# Patient Record
Sex: MALE | Race: WHITE | NOT HISPANIC OR LATINO
[De-identification: names, ages, dates, MRNs, and addresses within clinical notes are randomized per-mention and may not be internally consistent; named-entity substitution may affect disease eponyms.]

---

## 2017-01-02 ENCOUNTER — APPOINTMENT (OUTPATIENT)
Dept: PSYCHIATRY | Facility: CLINIC | Age: 77
End: 2017-01-02

## 2017-01-05 ENCOUNTER — APPOINTMENT (OUTPATIENT)
Dept: PSYCHIATRY | Facility: CLINIC | Age: 77
End: 2017-01-05

## 2017-01-09 ENCOUNTER — APPOINTMENT (OUTPATIENT)
Dept: PSYCHIATRY | Facility: CLINIC | Age: 77
End: 2017-01-09

## 2017-01-23 ENCOUNTER — APPOINTMENT (OUTPATIENT)
Dept: PSYCHIATRY | Facility: CLINIC | Age: 77
End: 2017-01-23

## 2017-01-30 ENCOUNTER — APPOINTMENT (OUTPATIENT)
Dept: PSYCHIATRY | Facility: CLINIC | Age: 77
End: 2017-01-30

## 2017-02-02 ENCOUNTER — APPOINTMENT (OUTPATIENT)
Dept: PSYCHIATRY | Facility: CLINIC | Age: 77
End: 2017-02-02

## 2017-02-06 ENCOUNTER — APPOINTMENT (OUTPATIENT)
Dept: PSYCHIATRY | Facility: CLINIC | Age: 77
End: 2017-02-06

## 2017-02-28 ENCOUNTER — APPOINTMENT (OUTPATIENT)
Dept: PSYCHIATRY | Facility: CLINIC | Age: 77
End: 2017-02-28

## 2017-03-07 ENCOUNTER — APPOINTMENT (OUTPATIENT)
Dept: PSYCHIATRY | Facility: CLINIC | Age: 77
End: 2017-03-07

## 2017-04-03 ENCOUNTER — APPOINTMENT (OUTPATIENT)
Dept: PSYCHIATRY | Facility: CLINIC | Age: 77
End: 2017-04-03

## 2017-04-06 ENCOUNTER — OUTPATIENT (OUTPATIENT)
Dept: OUTPATIENT SERVICES | Facility: HOSPITAL | Age: 77
LOS: 1 days | Discharge: HOME | End: 2017-04-06

## 2017-04-24 ENCOUNTER — APPOINTMENT (OUTPATIENT)
Dept: PSYCHIATRY | Facility: CLINIC | Age: 77
End: 2017-04-24

## 2017-05-03 ENCOUNTER — APPOINTMENT (OUTPATIENT)
Dept: PSYCHIATRY | Facility: CLINIC | Age: 77
End: 2017-05-03

## 2017-05-08 ENCOUNTER — APPOINTMENT (OUTPATIENT)
Dept: PSYCHIATRY | Facility: CLINIC | Age: 77
End: 2017-05-08

## 2017-05-10 DIAGNOSIS — F41.1 GENERALIZED ANXIETY DISORDER: ICD-10-CM

## 2017-06-05 ENCOUNTER — APPOINTMENT (OUTPATIENT)
Dept: PSYCHIATRY | Facility: CLINIC | Age: 77
End: 2017-06-05

## 2017-06-09 ENCOUNTER — APPOINTMENT (OUTPATIENT)
Dept: PSYCHIATRY | Facility: CLINIC | Age: 77
End: 2017-06-09

## 2017-07-10 ENCOUNTER — APPOINTMENT (OUTPATIENT)
Dept: PSYCHIATRY | Facility: CLINIC | Age: 77
End: 2017-07-10

## 2017-07-11 ENCOUNTER — APPOINTMENT (OUTPATIENT)
Dept: PSYCHIATRY | Facility: CLINIC | Age: 77
End: 2017-07-11

## 2017-07-24 ENCOUNTER — APPOINTMENT (OUTPATIENT)
Dept: PSYCHIATRY | Facility: CLINIC | Age: 77
End: 2017-07-24

## 2017-08-07 ENCOUNTER — APPOINTMENT (OUTPATIENT)
Dept: PSYCHIATRY | Facility: CLINIC | Age: 77
End: 2017-08-07
Payer: MEDICARE

## 2017-08-07 PROCEDURE — 99213 OFFICE O/P EST LOW 20 MIN: CPT

## 2017-08-07 PROCEDURE — 90832 PSYTX W PT 30 MINUTES: CPT

## 2017-08-14 ENCOUNTER — APPOINTMENT (OUTPATIENT)
Dept: PSYCHIATRY | Facility: CLINIC | Age: 77
End: 2017-08-14

## 2017-09-12 ENCOUNTER — APPOINTMENT (OUTPATIENT)
Dept: PSYCHIATRY | Facility: CLINIC | Age: 77
End: 2017-09-12

## 2017-09-14 ENCOUNTER — APPOINTMENT (OUTPATIENT)
Dept: PSYCHIATRY | Facility: CLINIC | Age: 77
End: 2017-09-14
Payer: MEDICARE

## 2017-09-14 PROCEDURE — 99213 OFFICE O/P EST LOW 20 MIN: CPT

## 2017-09-18 ENCOUNTER — APPOINTMENT (OUTPATIENT)
Dept: PSYCHIATRY | Facility: CLINIC | Age: 77
End: 2017-09-18
Payer: MEDICARE

## 2017-09-18 PROCEDURE — 90832 PSYTX W PT 30 MINUTES: CPT

## 2017-10-01 ENCOUNTER — OUTPATIENT (OUTPATIENT)
Dept: OUTPATIENT SERVICES | Facility: HOSPITAL | Age: 77
LOS: 1 days | Discharge: HOME | End: 2017-10-01

## 2017-10-09 ENCOUNTER — APPOINTMENT (OUTPATIENT)
Dept: PSYCHIATRY | Facility: CLINIC | Age: 77
End: 2017-10-09
Payer: MEDICARE

## 2017-10-09 PROCEDURE — 99213 OFFICE O/P EST LOW 20 MIN: CPT

## 2017-10-09 PROCEDURE — 90832 PSYTX W PT 30 MINUTES: CPT

## 2017-10-10 DIAGNOSIS — F34.1 DYSTHYMIC DISORDER: ICD-10-CM

## 2017-11-08 ENCOUNTER — APPOINTMENT (OUTPATIENT)
Dept: PSYCHIATRY | Facility: CLINIC | Age: 77
End: 2017-11-08
Payer: MEDICARE

## 2017-11-08 PROCEDURE — 90832 PSYTX W PT 30 MINUTES: CPT

## 2017-11-08 PROCEDURE — 99213 OFFICE O/P EST LOW 20 MIN: CPT

## 2017-12-04 ENCOUNTER — APPOINTMENT (OUTPATIENT)
Dept: PSYCHIATRY | Facility: CLINIC | Age: 77
End: 2017-12-04
Payer: MEDICARE

## 2017-12-04 PROCEDURE — 99214 OFFICE O/P EST MOD 30 MIN: CPT

## 2017-12-06 ENCOUNTER — APPOINTMENT (OUTPATIENT)
Dept: PSYCHIATRY | Facility: CLINIC | Age: 77
End: 2017-12-06

## 2018-01-03 ENCOUNTER — APPOINTMENT (OUTPATIENT)
Dept: PSYCHIATRY | Facility: CLINIC | Age: 78
End: 2018-01-03

## 2018-01-16 ENCOUNTER — APPOINTMENT (OUTPATIENT)
Dept: PSYCHIATRY | Facility: CLINIC | Age: 78
End: 2018-01-16
Payer: MEDICARE

## 2018-01-16 PROCEDURE — 99214 OFFICE O/P EST MOD 30 MIN: CPT

## 2018-01-21 ENCOUNTER — EMERGENCY (EMERGENCY)
Facility: HOSPITAL | Age: 78
LOS: 1 days | Discharge: HOME | End: 2018-01-21
Attending: EMERGENCY MEDICINE | Admitting: EMERGENCY MEDICINE
Payer: MEDICARE

## 2018-01-21 VITALS
RESPIRATION RATE: 17 BRPM | DIASTOLIC BLOOD PRESSURE: 80 MMHG | SYSTOLIC BLOOD PRESSURE: 203 MMHG | TEMPERATURE: 98 F | HEART RATE: 89 BPM | OXYGEN SATURATION: 98 %

## 2018-01-21 VITALS
HEART RATE: 70 BPM | TEMPERATURE: 98 F | DIASTOLIC BLOOD PRESSURE: 82 MMHG | RESPIRATION RATE: 16 BRPM | SYSTOLIC BLOOD PRESSURE: 160 MMHG | OXYGEN SATURATION: 97 %

## 2018-01-21 DIAGNOSIS — Z79.82 LONG TERM (CURRENT) USE OF ASPIRIN: ICD-10-CM

## 2018-01-21 DIAGNOSIS — Z79.84 LONG TERM (CURRENT) USE OF ORAL HYPOGLYCEMIC DRUGS: ICD-10-CM

## 2018-01-21 DIAGNOSIS — R42 DIZZINESS AND GIDDINESS: ICD-10-CM

## 2018-01-21 DIAGNOSIS — E11.9 TYPE 2 DIABETES MELLITUS WITHOUT COMPLICATIONS: ICD-10-CM

## 2018-01-21 DIAGNOSIS — Z79.899 OTHER LONG TERM (CURRENT) DRUG THERAPY: ICD-10-CM

## 2018-01-21 DIAGNOSIS — I10 ESSENTIAL (PRIMARY) HYPERTENSION: ICD-10-CM

## 2018-01-21 LAB
ALBUMIN SERPL ELPH-MCNC: 4.5 G/DL — SIGNIFICANT CHANGE UP (ref 3.4–5)
ALP SERPL-CCNC: 69 U/L — SIGNIFICANT CHANGE UP (ref 40–120)
ALT FLD-CCNC: 25 U/L — SIGNIFICANT CHANGE UP (ref 12–42)
ANION GAP SERPL CALC-SCNC: 6 MMOL/L — LOW (ref 9–16)
AST SERPL-CCNC: 30 U/L — SIGNIFICANT CHANGE UP (ref 15–37)
BASOPHILS NFR BLD AUTO: 0.3 % — SIGNIFICANT CHANGE UP (ref 0–2)
BILIRUB SERPL-MCNC: 0.5 MG/DL — SIGNIFICANT CHANGE UP (ref 0.2–1.2)
BUN SERPL-MCNC: 13 MG/DL — SIGNIFICANT CHANGE UP (ref 7–23)
CALCIUM SERPL-MCNC: 9.3 MG/DL — SIGNIFICANT CHANGE UP (ref 8.5–10.5)
CHLORIDE SERPL-SCNC: 94 MMOL/L — LOW (ref 96–108)
CO2 SERPL-SCNC: 32 MMOL/L — HIGH (ref 22–31)
CREAT SERPL-MCNC: 0.71 MG/DL — SIGNIFICANT CHANGE UP (ref 0.5–1.3)
EOSINOPHIL NFR BLD AUTO: 0.5 % — SIGNIFICANT CHANGE UP (ref 0–6)
GLUCOSE SERPL-MCNC: 144 MG/DL — HIGH (ref 70–99)
HCT VFR BLD CALC: 44.6 % — SIGNIFICANT CHANGE UP (ref 39–50)
HGB BLD-MCNC: 15.3 G/DL — SIGNIFICANT CHANGE UP (ref 13–17)
IMM GRANULOCYTES NFR BLD AUTO: 0.3 % — SIGNIFICANT CHANGE UP (ref 0–1.5)
LYMPHOCYTES # BLD AUTO: 36.1 % — SIGNIFICANT CHANGE UP (ref 13–44)
MCHC RBC-ENTMCNC: 28.8 PG — SIGNIFICANT CHANGE UP (ref 27–34)
MCHC RBC-ENTMCNC: 34.3 G/DL — SIGNIFICANT CHANGE UP (ref 32–36)
MCV RBC AUTO: 84 FL — SIGNIFICANT CHANGE UP (ref 80–100)
MONOCYTES NFR BLD AUTO: 6.6 % — SIGNIFICANT CHANGE UP (ref 2–14)
NEUTROPHILS NFR BLD AUTO: 56.2 % — SIGNIFICANT CHANGE UP (ref 43–77)
PLATELET # BLD AUTO: 187 K/UL — SIGNIFICANT CHANGE UP (ref 150–400)
POTASSIUM SERPL-MCNC: 3.9 MMOL/L — SIGNIFICANT CHANGE UP (ref 3.5–5.3)
POTASSIUM SERPL-SCNC: 3.9 MMOL/L — SIGNIFICANT CHANGE UP (ref 3.5–5.3)
PROT SERPL-MCNC: 7.9 G/DL — SIGNIFICANT CHANGE UP (ref 6.4–8.2)
RBC # BLD: 5.31 M/UL — SIGNIFICANT CHANGE UP (ref 4.2–5.8)
RBC # FLD: 13.2 % — SIGNIFICANT CHANGE UP (ref 10.3–16.9)
SODIUM SERPL-SCNC: 132 MMOL/L — SIGNIFICANT CHANGE UP (ref 132–145)
WBC # BLD: 8.7 K/UL — SIGNIFICANT CHANGE UP (ref 3.8–10.5)
WBC # FLD AUTO: 8.7 K/UL — SIGNIFICANT CHANGE UP (ref 3.8–10.5)

## 2018-01-21 PROCEDURE — 99284 EMERGENCY DEPT VISIT MOD MDM: CPT

## 2018-01-21 RX ORDER — SODIUM CHLORIDE 9 MG/ML
3 INJECTION INTRAMUSCULAR; INTRAVENOUS; SUBCUTANEOUS ONCE
Qty: 0 | Refills: 0 | Status: COMPLETED | OUTPATIENT
Start: 2018-01-21 | End: 2018-01-21

## 2018-01-21 RX ADMIN — SODIUM CHLORIDE 3 MILLILITER(S): 9 INJECTION INTRAMUSCULAR; INTRAVENOUS; SUBCUTANEOUS at 17:20

## 2018-01-21 NOTE — ED PROVIDER NOTE - OBJECTIVE STATEMENT
76 yo male w history of htn and dm 2 states episode of dizziness today. previous similar episode in past but today's episode lasted longer prompting him to come to er with his friend.     Denies HA,  numbness, tingling, photophobia, diplopia, change in vision/hearing/gait/mental status/speech, focal weakness, neck pain, rash, fever, chills, stiffness, CP, SOB, palpitations, diaphoresis, N/V/D/C, abdominal pain,

## 2018-01-21 NOTE — ED PROVIDER NOTE - MEDICAL DECISION MAKING DETAILS
progressive improvement throughout ed stay. ambulating around ed w/o dizziness.   At the time of discharge from the Emergency Department, the patient is alert with fluent appropriate speech and ambulatory without difficulty. A complete medical screening examination was performed and no emergency medical condition was identified.

## 2018-01-21 NOTE — ED PROVIDER NOTE - NEUROLOGICAL, MLM
Alert and oriented, speech fluent and appropriate cooperative no motor or sensory deficits. able to get up out of bed without difficulty to standing minimal dizziness (feels "much better"

## 2018-01-21 NOTE — ED ADULT NURSE NOTE - PMH
Depression    DM2 (diabetes mellitus, type 2)  since 5/16/2017  Melanoma    Tinnitus of left ear    Vertigo

## 2018-01-24 ENCOUNTER — APPOINTMENT (OUTPATIENT)
Dept: PSYCHIATRY | Facility: CLINIC | Age: 78
End: 2018-01-24
Payer: MEDICARE

## 2018-01-24 PROCEDURE — 90832 PSYTX W PT 30 MINUTES: CPT

## 2018-02-14 ENCOUNTER — APPOINTMENT (OUTPATIENT)
Dept: PSYCHIATRY | Facility: CLINIC | Age: 78
End: 2018-02-14
Payer: MEDICARE

## 2018-02-14 PROCEDURE — 90832 PSYTX W PT 30 MINUTES: CPT

## 2018-02-14 PROCEDURE — 99213 OFFICE O/P EST LOW 20 MIN: CPT

## 2018-03-21 ENCOUNTER — APPOINTMENT (OUTPATIENT)
Dept: PSYCHIATRY | Facility: CLINIC | Age: 78
End: 2018-03-21

## 2018-03-28 ENCOUNTER — APPOINTMENT (OUTPATIENT)
Dept: PSYCHIATRY | Facility: CLINIC | Age: 78
End: 2018-03-28
Payer: MEDICARE

## 2018-03-28 PROCEDURE — 90832 PSYTX W PT 30 MINUTES: CPT

## 2018-03-30 ENCOUNTER — APPOINTMENT (OUTPATIENT)
Dept: PSYCHIATRY | Facility: CLINIC | Age: 78
End: 2018-03-30
Payer: MEDICARE

## 2018-03-30 PROCEDURE — 99213 OFFICE O/P EST LOW 20 MIN: CPT

## 2018-04-25 ENCOUNTER — APPOINTMENT (OUTPATIENT)
Dept: PSYCHIATRY | Facility: CLINIC | Age: 78
End: 2018-04-25
Payer: MEDICARE

## 2018-04-25 PROCEDURE — 99213 OFFICE O/P EST LOW 20 MIN: CPT

## 2018-04-25 PROCEDURE — 90832 PSYTX W PT 30 MINUTES: CPT

## 2018-06-06 ENCOUNTER — APPOINTMENT (OUTPATIENT)
Dept: PSYCHIATRY | Facility: CLINIC | Age: 78
End: 2018-06-06
Payer: MEDICARE

## 2018-06-06 PROCEDURE — 99213 OFFICE O/P EST LOW 20 MIN: CPT

## 2018-06-06 PROCEDURE — 90832 PSYTX W PT 30 MINUTES: CPT

## 2018-08-03 PROBLEM — E11.9 TYPE 2 DIABETES MELLITUS WITHOUT COMPLICATIONS: Chronic | Status: ACTIVE | Noted: 2018-01-21

## 2018-08-08 ENCOUNTER — APPOINTMENT (OUTPATIENT)
Dept: PSYCHIATRY | Facility: CLINIC | Age: 78
End: 2018-08-08
Payer: MEDICARE

## 2018-08-08 PROCEDURE — 99213 OFFICE O/P EST LOW 20 MIN: CPT

## 2018-08-08 PROCEDURE — 90834 PSYTX W PT 45 MINUTES: CPT

## 2018-09-14 ENCOUNTER — EMERGENCY (EMERGENCY)
Facility: HOSPITAL | Age: 78
LOS: 1 days | Discharge: HOME | End: 2018-09-14
Admitting: EMERGENCY MEDICINE
Payer: MEDICARE

## 2018-09-14 VITALS
RESPIRATION RATE: 20 BRPM | OXYGEN SATURATION: 98 % | TEMPERATURE: 98 F | HEART RATE: 72 BPM | DIASTOLIC BLOOD PRESSURE: 90 MMHG | SYSTOLIC BLOOD PRESSURE: 199 MMHG

## 2018-09-14 VITALS — HEART RATE: 78 BPM | SYSTOLIC BLOOD PRESSURE: 161 MMHG | DIASTOLIC BLOOD PRESSURE: 77 MMHG

## 2018-09-14 DIAGNOSIS — I10 ESSENTIAL (PRIMARY) HYPERTENSION: ICD-10-CM

## 2018-09-14 DIAGNOSIS — R26.2 DIFFICULTY IN WALKING, NOT ELSEWHERE CLASSIFIED: ICD-10-CM

## 2018-09-14 DIAGNOSIS — E11.9 TYPE 2 DIABETES MELLITUS WITHOUT COMPLICATIONS: ICD-10-CM

## 2018-09-14 DIAGNOSIS — R42 DIZZINESS AND GIDDINESS: ICD-10-CM

## 2018-09-14 DIAGNOSIS — Z79.899 OTHER LONG TERM (CURRENT) DRUG THERAPY: ICD-10-CM

## 2018-09-14 DIAGNOSIS — Z79.84 LONG TERM (CURRENT) USE OF ORAL HYPOGLYCEMIC DRUGS: ICD-10-CM

## 2018-09-14 DIAGNOSIS — Z79.82 LONG TERM (CURRENT) USE OF ASPIRIN: ICD-10-CM

## 2018-09-14 LAB
ALBUMIN SERPL ELPH-MCNC: 4 G/DL — SIGNIFICANT CHANGE UP (ref 3.4–5)
ALP SERPL-CCNC: 61 U/L — SIGNIFICANT CHANGE UP (ref 40–120)
ALT FLD-CCNC: 27 U/L — SIGNIFICANT CHANGE UP (ref 12–42)
ANION GAP SERPL CALC-SCNC: 5 MMOL/L — LOW (ref 9–16)
APPEARANCE UR: CLEAR — SIGNIFICANT CHANGE UP
AST SERPL-CCNC: 20 U/L — SIGNIFICANT CHANGE UP (ref 15–37)
BASOPHILS NFR BLD AUTO: 0.6 % — SIGNIFICANT CHANGE UP (ref 0–2)
BILIRUB SERPL-MCNC: 0.4 MG/DL — SIGNIFICANT CHANGE UP (ref 0.2–1.2)
BILIRUB UR-MCNC: NEGATIVE — SIGNIFICANT CHANGE UP
BUN SERPL-MCNC: 16 MG/DL — SIGNIFICANT CHANGE UP (ref 7–23)
CALCIUM SERPL-MCNC: 8.7 MG/DL — SIGNIFICANT CHANGE UP (ref 8.5–10.5)
CHLORIDE SERPL-SCNC: 103 MMOL/L — SIGNIFICANT CHANGE UP (ref 96–108)
CK MB BLD-MCNC: 2.5 % — SIGNIFICANT CHANGE UP
CK MB CFR SERPL CALC: 2.9 NG/ML — SIGNIFICANT CHANGE UP (ref 0.5–3.6)
CO2 SERPL-SCNC: 32 MMOL/L — HIGH (ref 22–31)
COLOR SPEC: YELLOW — SIGNIFICANT CHANGE UP
CREAT SERPL-MCNC: 0.86 MG/DL — SIGNIFICANT CHANGE UP (ref 0.5–1.3)
DIFF PNL FLD: ABNORMAL
EOSINOPHIL NFR BLD AUTO: 0.9 % — SIGNIFICANT CHANGE UP (ref 0–6)
GLUCOSE SERPL-MCNC: 214 MG/DL — HIGH (ref 70–99)
GLUCOSE UR QL: NEGATIVE — SIGNIFICANT CHANGE UP
HCT VFR BLD CALC: 40.9 % — SIGNIFICANT CHANGE UP (ref 39–50)
HGB BLD-MCNC: 13.8 G/DL — SIGNIFICANT CHANGE UP (ref 13–17)
IMM GRANULOCYTES NFR BLD AUTO: 0.5 % — SIGNIFICANT CHANGE UP (ref 0–1.5)
KETONES UR-MCNC: NEGATIVE — SIGNIFICANT CHANGE UP
LEUKOCYTE ESTERASE UR-ACNC: NEGATIVE — SIGNIFICANT CHANGE UP
LYMPHOCYTES # BLD AUTO: 35.9 % — SIGNIFICANT CHANGE UP (ref 13–44)
MAGNESIUM SERPL-MCNC: 2.3 MG/DL — SIGNIFICANT CHANGE UP (ref 1.6–2.6)
MCHC RBC-ENTMCNC: 29.2 PG — SIGNIFICANT CHANGE UP (ref 27–34)
MCHC RBC-ENTMCNC: 33.7 G/DL — SIGNIFICANT CHANGE UP (ref 32–36)
MCV RBC AUTO: 86.7 FL — SIGNIFICANT CHANGE UP (ref 80–100)
MONOCYTES NFR BLD AUTO: 6.5 % — SIGNIFICANT CHANGE UP (ref 2–14)
NEUTROPHILS NFR BLD AUTO: 55.6 % — SIGNIFICANT CHANGE UP (ref 43–77)
NITRITE UR-MCNC: NEGATIVE — SIGNIFICANT CHANGE UP
PH UR: 5.5 — SIGNIFICANT CHANGE UP (ref 5–8)
PLATELET # BLD AUTO: 137 K/UL — LOW (ref 150–400)
POTASSIUM SERPL-MCNC: 4.1 MMOL/L — SIGNIFICANT CHANGE UP (ref 3.5–5.3)
POTASSIUM SERPL-SCNC: 4.1 MMOL/L — SIGNIFICANT CHANGE UP (ref 3.5–5.3)
PROT SERPL-MCNC: 7.4 G/DL — SIGNIFICANT CHANGE UP (ref 6.4–8.2)
PROT UR-MCNC: ABNORMAL MG/DL
RBC # BLD: 4.72 M/UL — SIGNIFICANT CHANGE UP (ref 4.2–5.8)
RBC # FLD: 13.9 % — SIGNIFICANT CHANGE UP (ref 10.3–16.9)
SODIUM SERPL-SCNC: 140 MMOL/L — SIGNIFICANT CHANGE UP (ref 132–145)
SP GR SPEC: >=1.03 — SIGNIFICANT CHANGE UP (ref 1–1.03)
TROPONIN I SERPL-MCNC: <0.017 NG/ML — LOW (ref 0.02–0.06)
UROBILINOGEN FLD QL: 0.2 E.U./DL — SIGNIFICANT CHANGE UP
WBC # BLD: 6.6 K/UL — SIGNIFICANT CHANGE UP (ref 3.8–10.5)
WBC # FLD AUTO: 6.6 K/UL — SIGNIFICANT CHANGE UP (ref 3.8–10.5)

## 2018-09-14 PROCEDURE — 70450 CT HEAD/BRAIN W/O DYE: CPT | Mod: 26

## 2018-09-14 PROCEDURE — 99283 EMERGENCY DEPT VISIT LOW MDM: CPT

## 2018-09-14 PROCEDURE — 71045 X-RAY EXAM CHEST 1 VIEW: CPT | Mod: 26

## 2018-09-14 RX ORDER — MECLIZINE HCL 12.5 MG
25 TABLET ORAL ONCE
Qty: 0 | Refills: 0 | Status: DISCONTINUED | OUTPATIENT
Start: 2018-09-14 | End: 2018-09-18

## 2018-09-14 RX ORDER — MECLIZINE HCL 12.5 MG
1 TABLET ORAL
Qty: 21 | Refills: 0
Start: 2018-09-14 | End: 2018-09-20

## 2018-09-14 RX ORDER — SODIUM CHLORIDE 9 MG/ML
1000 INJECTION INTRAMUSCULAR; INTRAVENOUS; SUBCUTANEOUS ONCE
Qty: 0 | Refills: 0 | Status: COMPLETED | OUTPATIENT
Start: 2018-09-14 | End: 2018-09-14

## 2018-09-14 RX ADMIN — SODIUM CHLORIDE 2000 MILLILITER(S): 9 INJECTION INTRAMUSCULAR; INTRAVENOUS; SUBCUTANEOUS at 22:37

## 2018-09-14 NOTE — ED ADULT TRIAGE NOTE - CHIEF COMPLAINT QUOTE
walk in patient with complaints of dizziness x 45 minutes while at dinner. Patient states has improved a bit but still concerned for his balance. Reports hx high blood pressure and type 2 diabetes. BP high at triage. Denies any CP, sob or other distress at this time.

## 2018-09-14 NOTE — ED PROVIDER NOTE - MEDICAL DECISION MAKING DETAILS
patient PMHX DM, HTN presents with dizziness as if the room is spinning. will give meclizine, check labs, do CT, and continue to monitor

## 2018-09-14 NOTE — ED PROVIDER NOTE - OBJECTIVE STATEMENT
PMHX DM, HTN, depression, deaf in L ear, tinnitis presents with dizziness as if the room is spinning while having dinner this evening. denies sycnope, fall, fever, chills, chest pain, palpitations, SOB.

## 2018-11-07 ENCOUNTER — APPOINTMENT (OUTPATIENT)
Dept: PSYCHIATRY | Facility: CLINIC | Age: 78
End: 2018-11-07
Payer: MEDICARE

## 2018-11-07 PROCEDURE — 90832 PSYTX W PT 30 MINUTES: CPT

## 2018-11-09 ENCOUNTER — APPOINTMENT (OUTPATIENT)
Dept: PSYCHIATRY | Facility: CLINIC | Age: 78
End: 2018-11-09
Payer: MEDICARE

## 2018-11-09 PROCEDURE — 99213 OFFICE O/P EST LOW 20 MIN: CPT

## 2018-12-14 ENCOUNTER — APPOINTMENT (OUTPATIENT)
Dept: PSYCHIATRY | Facility: CLINIC | Age: 78
End: 2018-12-14

## 2018-12-19 ENCOUNTER — APPOINTMENT (OUTPATIENT)
Dept: PSYCHIATRY | Facility: CLINIC | Age: 78
End: 2018-12-19
Payer: MEDICARE

## 2018-12-19 PROCEDURE — 99214 OFFICE O/P EST MOD 30 MIN: CPT

## 2019-01-31 ENCOUNTER — APPOINTMENT (OUTPATIENT)
Dept: PSYCHIATRY | Facility: CLINIC | Age: 79
End: 2019-01-31

## 2019-02-20 ENCOUNTER — APPOINTMENT (OUTPATIENT)
Dept: PSYCHIATRY | Facility: CLINIC | Age: 79
End: 2019-02-20
Payer: MEDICARE

## 2019-02-20 PROCEDURE — 90832 PSYTX W PT 30 MINUTES: CPT

## 2019-02-25 ENCOUNTER — APPOINTMENT (OUTPATIENT)
Dept: PSYCHIATRY | Facility: CLINIC | Age: 79
End: 2019-02-25
Payer: MEDICARE

## 2019-02-25 PROCEDURE — 99214 OFFICE O/P EST MOD 30 MIN: CPT

## 2019-02-27 ENCOUNTER — APPOINTMENT (OUTPATIENT)
Dept: PSYCHIATRY | Facility: CLINIC | Age: 79
End: 2019-02-27
Payer: MEDICARE

## 2019-02-27 PROCEDURE — 90832 PSYTX W PT 30 MINUTES: CPT

## 2019-03-11 ENCOUNTER — APPOINTMENT (OUTPATIENT)
Dept: PSYCHIATRY | Facility: CLINIC | Age: 79
End: 2019-03-11
Payer: MEDICARE

## 2019-03-11 PROCEDURE — 99213 OFFICE O/P EST LOW 20 MIN: CPT

## 2019-03-15 ENCOUNTER — APPOINTMENT (OUTPATIENT)
Dept: PSYCHIATRY | Facility: CLINIC | Age: 79
End: 2019-03-15
Payer: MEDICARE

## 2019-03-15 PROCEDURE — 90832 PSYTX W PT 30 MINUTES: CPT

## 2019-03-22 ENCOUNTER — APPOINTMENT (OUTPATIENT)
Dept: PSYCHIATRY | Facility: CLINIC | Age: 79
End: 2019-03-22
Payer: MEDICARE

## 2019-03-22 PROCEDURE — 90832 PSYTX W PT 30 MINUTES: CPT

## 2019-03-29 ENCOUNTER — APPOINTMENT (OUTPATIENT)
Dept: PSYCHIATRY | Facility: CLINIC | Age: 79
End: 2019-03-29
Payer: MEDICARE

## 2019-03-29 PROCEDURE — 90832 PSYTX W PT 30 MINUTES: CPT

## 2019-04-12 ENCOUNTER — APPOINTMENT (OUTPATIENT)
Dept: PSYCHIATRY | Facility: CLINIC | Age: 79
End: 2019-04-12

## 2019-05-17 ENCOUNTER — APPOINTMENT (OUTPATIENT)
Dept: PSYCHIATRY | Facility: CLINIC | Age: 79
End: 2019-05-17
Payer: MEDICARE

## 2019-05-17 PROCEDURE — 90832 PSYTX W PT 30 MINUTES: CPT

## 2019-05-20 ENCOUNTER — APPOINTMENT (OUTPATIENT)
Dept: PSYCHIATRY | Facility: CLINIC | Age: 79
End: 2019-05-20
Payer: MEDICARE

## 2019-05-20 PROCEDURE — 99214 OFFICE O/P EST MOD 30 MIN: CPT

## 2019-05-31 ENCOUNTER — APPOINTMENT (OUTPATIENT)
Dept: PSYCHIATRY | Facility: CLINIC | Age: 79
End: 2019-05-31
Payer: MEDICARE

## 2019-05-31 PROCEDURE — 90832 PSYTX W PT 30 MINUTES: CPT

## 2019-06-17 ENCOUNTER — APPOINTMENT (OUTPATIENT)
Dept: PSYCHIATRY | Facility: CLINIC | Age: 79
End: 2019-06-17

## 2019-06-20 ENCOUNTER — APPOINTMENT (OUTPATIENT)
Dept: PSYCHIATRY | Facility: CLINIC | Age: 79
End: 2019-06-20
Payer: MEDICARE

## 2019-06-20 PROCEDURE — 99214 OFFICE O/P EST MOD 30 MIN: CPT

## 2019-06-25 VITALS
RESPIRATION RATE: 17 BRPM | TEMPERATURE: 98 F | DIASTOLIC BLOOD PRESSURE: 80 MMHG | SYSTOLIC BLOOD PRESSURE: 159 MMHG | HEART RATE: 71 BPM | OXYGEN SATURATION: 97 %

## 2019-06-25 LAB
ALBUMIN SERPL ELPH-MCNC: 4 G/DL — SIGNIFICANT CHANGE UP (ref 3.4–5)
ALP SERPL-CCNC: 63 U/L — SIGNIFICANT CHANGE UP (ref 40–120)
ALT FLD-CCNC: 22 U/L — SIGNIFICANT CHANGE UP (ref 12–42)
ANION GAP SERPL CALC-SCNC: 11 MMOL/L — SIGNIFICANT CHANGE UP (ref 9–16)
APPEARANCE UR: CLEAR — SIGNIFICANT CHANGE UP
AST SERPL-CCNC: 19 U/L — SIGNIFICANT CHANGE UP (ref 15–37)
BASOPHILS NFR BLD AUTO: 0.3 % — SIGNIFICANT CHANGE UP (ref 0–2)
BILIRUB SERPL-MCNC: 0.6 MG/DL — SIGNIFICANT CHANGE UP (ref 0.2–1.2)
BILIRUB UR-MCNC: NEGATIVE — SIGNIFICANT CHANGE UP
BUN SERPL-MCNC: 21 MG/DL — SIGNIFICANT CHANGE UP (ref 7–23)
CALCIUM SERPL-MCNC: 9 MG/DL — SIGNIFICANT CHANGE UP (ref 8.5–10.5)
CHLORIDE SERPL-SCNC: 103 MMOL/L — SIGNIFICANT CHANGE UP (ref 96–108)
CO2 SERPL-SCNC: 24 MMOL/L — SIGNIFICANT CHANGE UP (ref 22–31)
COLOR SPEC: YELLOW — SIGNIFICANT CHANGE UP
CREAT SERPL-MCNC: 0.71 MG/DL — SIGNIFICANT CHANGE UP (ref 0.5–1.3)
DIFF PNL FLD: ABNORMAL
EOSINOPHIL NFR BLD AUTO: 0.2 % — SIGNIFICANT CHANGE UP (ref 0–6)
GLUCOSE SERPL-MCNC: 162 MG/DL — HIGH (ref 70–99)
GLUCOSE UR QL: >=1000
HCT VFR BLD CALC: 45.7 % — SIGNIFICANT CHANGE UP (ref 39–50)
HGB BLD-MCNC: 15.6 G/DL — SIGNIFICANT CHANGE UP (ref 13–17)
IMM GRANULOCYTES NFR BLD AUTO: 0.8 % — SIGNIFICANT CHANGE UP (ref 0–1.5)
KETONES UR-MCNC: 40 MG/DL
LEUKOCYTE ESTERASE UR-ACNC: NEGATIVE — SIGNIFICANT CHANGE UP
LIDOCAIN IGE QN: 69 U/L — LOW (ref 73–393)
LYMPHOCYTES # BLD AUTO: 11.8 % — LOW (ref 13–44)
MAGNESIUM SERPL-MCNC: 1.9 MG/DL — SIGNIFICANT CHANGE UP (ref 1.6–2.6)
MCHC RBC-ENTMCNC: 29.4 PG — SIGNIFICANT CHANGE UP (ref 27–34)
MCHC RBC-ENTMCNC: 34.1 G/DL — SIGNIFICANT CHANGE UP (ref 32–36)
MCV RBC AUTO: 86.1 FL — SIGNIFICANT CHANGE UP (ref 80–100)
MONOCYTES NFR BLD AUTO: 4.3 % — SIGNIFICANT CHANGE UP (ref 2–14)
NEUTROPHILS NFR BLD AUTO: 82.6 % — HIGH (ref 43–77)
NITRITE UR-MCNC: NEGATIVE — SIGNIFICANT CHANGE UP
PH UR: 6.5 — SIGNIFICANT CHANGE UP (ref 5–8)
PLATELET # BLD AUTO: 146 K/UL — LOW (ref 150–400)
POTASSIUM SERPL-MCNC: 3.8 MMOL/L — SIGNIFICANT CHANGE UP (ref 3.5–5.3)
POTASSIUM SERPL-SCNC: 3.8 MMOL/L — SIGNIFICANT CHANGE UP (ref 3.5–5.3)
PROT SERPL-MCNC: 7.3 G/DL — SIGNIFICANT CHANGE UP (ref 6.4–8.2)
PROT UR-MCNC: 30 MG/DL
RBC # BLD: 5.31 M/UL — SIGNIFICANT CHANGE UP (ref 4.2–5.8)
RBC # FLD: 14.1 % — SIGNIFICANT CHANGE UP (ref 10.3–14.5)
SODIUM SERPL-SCNC: 138 MMOL/L — SIGNIFICANT CHANGE UP (ref 132–145)
SP GR SPEC: 1.01 — SIGNIFICANT CHANGE UP (ref 1–1.03)
TROPONIN I SERPL-MCNC: <0.017 NG/ML — LOW (ref 0.02–0.06)
UROBILINOGEN FLD QL: 0.2 E.U./DL — SIGNIFICANT CHANGE UP
WBC # BLD: 11.7 K/UL — HIGH (ref 3.8–10.5)
WBC # FLD AUTO: 11.7 K/UL — HIGH (ref 3.8–10.5)

## 2019-06-25 RX ORDER — SODIUM CHLORIDE 9 MG/ML
1000 INJECTION INTRAMUSCULAR; INTRAVENOUS; SUBCUTANEOUS ONCE
Refills: 0 | Status: COMPLETED | OUTPATIENT
Start: 2019-06-25 | End: 2019-06-25

## 2019-06-25 RX ADMIN — SODIUM CHLORIDE 1000 MILLILITER(S): 9 INJECTION INTRAMUSCULAR; INTRAVENOUS; SUBCUTANEOUS at 23:27

## 2019-06-25 NOTE — ED ADULT NURSE NOTE - OBJECTIVE STATEMENT
80 y/o M who comes into the ed c/o abd pain since 5 pm. pt reports he has felt this pain before and has been told it is a "spastic colon". denies nausea, vomiting or diarrhea. pt denies fevers, but states "I was bent over in pain and broke out in a sweat". reports hx diabetes, htn, hld and parkinsons. pt lives alone and uses walker.

## 2019-06-25 NOTE — ED ADULT NURSE NOTE - CHPI ED NUR SYMPTOMS NEG
no burning urination/no abdominal distension/no fever/no vomiting/no hematuria/no dysuria/no diarrhea/no chills/no nausea/no blood in stool

## 2019-06-25 NOTE — ED ADULT TRIAGE NOTE - CHIEF COMPLAINT QUOTE
BIBA from home complaining of abdominal pain for a day. Reports nausea, no vomiting/diarrhea/constipation.

## 2019-06-26 ENCOUNTER — INPATIENT (INPATIENT)
Facility: HOSPITAL | Age: 79
LOS: 0 days | Discharge: HOME | DRG: 392 | End: 2019-06-27
Attending: STUDENT IN AN ORGANIZED HEALTH CARE EDUCATION/TRAINING PROGRAM | Admitting: STUDENT IN AN ORGANIZED HEALTH CARE EDUCATION/TRAINING PROGRAM
Payer: COMMERCIAL

## 2019-06-26 DIAGNOSIS — I63.9 CEREBRAL INFARCTION, UNSPECIFIED: Chronic | ICD-10-CM

## 2019-06-26 LAB
ALBUMIN SERPL ELPH-MCNC: 3.4 G/DL — SIGNIFICANT CHANGE UP (ref 3.4–5)
ALP SERPL-CCNC: 56 U/L — SIGNIFICANT CHANGE UP (ref 40–120)
ALT FLD-CCNC: 24 U/L — SIGNIFICANT CHANGE UP (ref 12–42)
ANION GAP SERPL CALC-SCNC: 7 MMOL/L — LOW (ref 9–16)
APTT BLD: 33.9 SEC — SIGNIFICANT CHANGE UP (ref 27.5–36.3)
AST SERPL-CCNC: 14 U/L — LOW (ref 15–37)
BILIRUB SERPL-MCNC: 0.5 MG/DL — SIGNIFICANT CHANGE UP (ref 0.2–1.2)
BUN SERPL-MCNC: 17 MG/DL — SIGNIFICANT CHANGE UP (ref 7–23)
CALCIUM SERPL-MCNC: 8.3 MG/DL — LOW (ref 8.5–10.5)
CHLORIDE SERPL-SCNC: 107 MMOL/L — SIGNIFICANT CHANGE UP (ref 96–108)
CO2 SERPL-SCNC: 27 MMOL/L — SIGNIFICANT CHANGE UP (ref 22–31)
CREAT SERPL-MCNC: 0.67 MG/DL — SIGNIFICANT CHANGE UP (ref 0.5–1.3)
GLUCOSE BLDC GLUCOMTR-MCNC: 128 MG/DL — HIGH (ref 70–99)
GLUCOSE BLDC GLUCOMTR-MCNC: 161 MG/DL — HIGH (ref 70–99)
GLUCOSE SERPL-MCNC: 127 MG/DL — HIGH (ref 70–99)
HCT VFR BLD CALC: 42.5 % — SIGNIFICANT CHANGE UP (ref 39–50)
HGB BLD-MCNC: 14.1 G/DL — SIGNIFICANT CHANGE UP (ref 13–17)
INR BLD: 1.1 — SIGNIFICANT CHANGE UP (ref 0.88–1.16)
LACTATE SERPL-SCNC: 0.9 MMOL/L — SIGNIFICANT CHANGE UP (ref 0.4–2)
MCHC RBC-ENTMCNC: 29 PG — SIGNIFICANT CHANGE UP (ref 27–34)
MCHC RBC-ENTMCNC: 33.2 G/DL — SIGNIFICANT CHANGE UP (ref 32–36)
MCV RBC AUTO: 87.3 FL — SIGNIFICANT CHANGE UP (ref 80–100)
PLATELET # BLD AUTO: 123 K/UL — LOW (ref 150–400)
POTASSIUM SERPL-MCNC: 3.7 MMOL/L — SIGNIFICANT CHANGE UP (ref 3.5–5.3)
POTASSIUM SERPL-SCNC: 3.7 MMOL/L — SIGNIFICANT CHANGE UP (ref 3.5–5.3)
PROT SERPL-MCNC: 6.5 G/DL — SIGNIFICANT CHANGE UP (ref 6.4–8.2)
PROTHROM AB SERPL-ACNC: 12.3 SEC — SIGNIFICANT CHANGE UP (ref 10–12.9)
RBC # BLD: 4.87 M/UL — SIGNIFICANT CHANGE UP (ref 4.2–5.8)
RBC # FLD: 14.2 % — SIGNIFICANT CHANGE UP (ref 10.3–14.5)
SODIUM SERPL-SCNC: 141 MMOL/L — SIGNIFICANT CHANGE UP (ref 132–145)
WBC # BLD: 9.2 K/UL — SIGNIFICANT CHANGE UP (ref 3.8–10.5)
WBC # FLD AUTO: 9.2 K/UL — SIGNIFICANT CHANGE UP (ref 3.8–10.5)

## 2019-06-26 PROCEDURE — 99223 1ST HOSP IP/OBS HIGH 75: CPT | Mod: GC,57

## 2019-06-26 PROCEDURE — 76705 ECHO EXAM OF ABDOMEN: CPT | Mod: 26

## 2019-06-26 PROCEDURE — 74177 CT ABD & PELVIS W/CONTRAST: CPT | Mod: 26

## 2019-06-26 PROCEDURE — 99285 EMERGENCY DEPT VISIT HI MDM: CPT

## 2019-06-26 PROCEDURE — 78226 HEPATOBILIARY SYSTEM IMAGING: CPT | Mod: 26

## 2019-06-26 RX ORDER — ATORVASTATIN CALCIUM 80 MG/1
20 TABLET, FILM COATED ORAL AT BEDTIME
Refills: 0 | Status: DISCONTINUED | OUTPATIENT
Start: 2019-06-26 | End: 2019-06-27

## 2019-06-26 RX ORDER — METRONIDAZOLE 500 MG
500 TABLET ORAL ONCE
Refills: 0 | Status: COMPLETED | OUTPATIENT
Start: 2019-06-26 | End: 2019-06-26

## 2019-06-26 RX ORDER — DEXTROSE 50 % IN WATER 50 %
25 SYRINGE (ML) INTRAVENOUS ONCE
Refills: 0 | Status: DISCONTINUED | OUTPATIENT
Start: 2019-06-26 | End: 2019-06-27

## 2019-06-26 RX ORDER — METRONIDAZOLE 500 MG
500 TABLET ORAL EVERY 8 HOURS
Refills: 0 | Status: DISCONTINUED | OUTPATIENT
Start: 2019-06-26 | End: 2019-06-27

## 2019-06-26 RX ORDER — CEFTRIAXONE 500 MG/1
1000 INJECTION, POWDER, FOR SOLUTION INTRAMUSCULAR; INTRAVENOUS ONCE
Refills: 0 | Status: COMPLETED | OUTPATIENT
Start: 2019-06-26 | End: 2019-06-26

## 2019-06-26 RX ORDER — INSULIN LISPRO 100/ML
VIAL (ML) SUBCUTANEOUS
Refills: 0 | Status: DISCONTINUED | OUTPATIENT
Start: 2019-06-26 | End: 2019-06-27

## 2019-06-26 RX ORDER — ACETAMINOPHEN 500 MG
1000 TABLET ORAL ONCE
Refills: 0 | Status: COMPLETED | OUTPATIENT
Start: 2019-06-26 | End: 2019-06-26

## 2019-06-26 RX ORDER — SODIUM CHLORIDE 9 MG/ML
1000 INJECTION INTRAMUSCULAR; INTRAVENOUS; SUBCUTANEOUS
Refills: 0 | Status: DISCONTINUED | OUTPATIENT
Start: 2019-06-26 | End: 2019-06-26

## 2019-06-26 RX ORDER — SODIUM CHLORIDE 9 MG/ML
1000 INJECTION, SOLUTION INTRAVENOUS
Refills: 0 | Status: DISCONTINUED | OUTPATIENT
Start: 2019-06-26 | End: 2019-06-27

## 2019-06-26 RX ORDER — LANOLIN ALCOHOL/MO/W.PET/CERES
5 CREAM (GRAM) TOPICAL ONCE
Refills: 0 | Status: COMPLETED | OUTPATIENT
Start: 2019-06-26 | End: 2019-06-26

## 2019-06-26 RX ORDER — AMLODIPINE BESYLATE 2.5 MG/1
2.5 TABLET ORAL DAILY
Refills: 0 | Status: DISCONTINUED | OUTPATIENT
Start: 2019-06-26 | End: 2019-06-27

## 2019-06-26 RX ORDER — ACETAMINOPHEN 500 MG
1000 TABLET ORAL ONCE
Refills: 0 | Status: COMPLETED | OUTPATIENT
Start: 2019-06-27 | End: 2019-06-27

## 2019-06-26 RX ORDER — CEFTRIAXONE 500 MG/1
1000 INJECTION, POWDER, FOR SOLUTION INTRAMUSCULAR; INTRAVENOUS EVERY 24 HOURS
Refills: 0 | Status: DISCONTINUED | OUTPATIENT
Start: 2019-06-26 | End: 2019-06-27

## 2019-06-26 RX ORDER — LISINOPRIL 2.5 MG/1
20 TABLET ORAL DAILY
Refills: 0 | Status: DISCONTINUED | OUTPATIENT
Start: 2019-06-26 | End: 2019-06-27

## 2019-06-26 RX ORDER — SODIUM CHLORIDE 9 MG/ML
1000 INJECTION INTRAMUSCULAR; INTRAVENOUS; SUBCUTANEOUS ONCE
Refills: 0 | Status: COMPLETED | OUTPATIENT
Start: 2019-06-26 | End: 2019-06-26

## 2019-06-26 RX ORDER — ASPIRIN/CALCIUM CARB/MAGNESIUM 324 MG
81 TABLET ORAL DAILY
Refills: 0 | Status: DISCONTINUED | OUTPATIENT
Start: 2019-06-26 | End: 2019-06-27

## 2019-06-26 RX ORDER — DEXTROSE 50 % IN WATER 50 %
12.5 SYRINGE (ML) INTRAVENOUS ONCE
Refills: 0 | Status: DISCONTINUED | OUTPATIENT
Start: 2019-06-26 | End: 2019-06-27

## 2019-06-26 RX ORDER — MECLIZINE HCL 12.5 MG
25 TABLET ORAL THREE TIMES A DAY
Refills: 0 | Status: DISCONTINUED | OUTPATIENT
Start: 2019-06-26 | End: 2019-06-27

## 2019-06-26 RX ORDER — GLUCAGON INJECTION, SOLUTION 0.5 MG/.1ML
1 INJECTION, SOLUTION SUBCUTANEOUS ONCE
Refills: 0 | Status: DISCONTINUED | OUTPATIENT
Start: 2019-06-26 | End: 2019-06-27

## 2019-06-26 RX ORDER — LABETALOL HCL 100 MG
10 TABLET ORAL EVERY 6 HOURS
Refills: 0 | Status: DISCONTINUED | OUTPATIENT
Start: 2019-06-26 | End: 2019-06-27

## 2019-06-26 RX ORDER — MORPHINE SULFATE 50 MG/1
4 CAPSULE, EXTENDED RELEASE ORAL ONCE
Refills: 0 | Status: DISCONTINUED | OUTPATIENT
Start: 2019-06-26 | End: 2019-06-26

## 2019-06-26 RX ORDER — DEXTROSE 50 % IN WATER 50 %
15 SYRINGE (ML) INTRAVENOUS ONCE
Refills: 0 | Status: DISCONTINUED | OUTPATIENT
Start: 2019-06-26 | End: 2019-06-27

## 2019-06-26 RX ADMIN — SODIUM CHLORIDE 50 MILLILITER(S): 9 INJECTION, SOLUTION INTRAVENOUS at 16:53

## 2019-06-26 RX ADMIN — SODIUM CHLORIDE 60 MILLILITER(S): 9 INJECTION INTRAMUSCULAR; INTRAVENOUS; SUBCUTANEOUS at 03:00

## 2019-06-26 RX ADMIN — MORPHINE SULFATE 4 MILLIGRAM(S): 50 CAPSULE, EXTENDED RELEASE ORAL at 01:58

## 2019-06-26 RX ADMIN — Medication 500 MILLIGRAM(S): at 06:40

## 2019-06-26 RX ADMIN — Medication 100 MILLIGRAM(S): at 16:52

## 2019-06-26 RX ADMIN — Medication 500 MILLIGRAM(S): at 01:30

## 2019-06-26 RX ADMIN — ATORVASTATIN CALCIUM 20 MILLIGRAM(S): 80 TABLET, FILM COATED ORAL at 22:30

## 2019-06-26 RX ADMIN — SODIUM CHLORIDE 500 MILLILITER(S): 9 INJECTION INTRAMUSCULAR; INTRAVENOUS; SUBCUTANEOUS at 01:31

## 2019-06-26 RX ADMIN — SODIUM CHLORIDE 1000 MILLILITER(S): 9 INJECTION INTRAMUSCULAR; INTRAVENOUS; SUBCUTANEOUS at 00:40

## 2019-06-26 RX ADMIN — SODIUM CHLORIDE 1000 MILLILITER(S): 9 INJECTION INTRAMUSCULAR; INTRAVENOUS; SUBCUTANEOUS at 01:30

## 2019-06-26 RX ADMIN — CEFTRIAXONE 1000 MILLIGRAM(S): 500 INJECTION, POWDER, FOR SOLUTION INTRAMUSCULAR; INTRAVENOUS at 01:58

## 2019-06-26 RX ADMIN — Medication 25 MILLIGRAM(S): at 22:30

## 2019-06-26 RX ADMIN — Medication 1000 MILLIGRAM(S): at 23:55

## 2019-06-26 RX ADMIN — Medication 100 MILLIGRAM(S): at 01:30

## 2019-06-26 RX ADMIN — MORPHINE SULFATE 4 MILLIGRAM(S): 50 CAPSULE, EXTENDED RELEASE ORAL at 01:30

## 2019-06-26 RX ADMIN — Medication 5 MILLIGRAM(S): at 22:30

## 2019-06-26 RX ADMIN — SODIUM CHLORIDE 120 MILLILITER(S): 9 INJECTION, SOLUTION INTRAVENOUS at 11:06

## 2019-06-26 RX ADMIN — Medication 400 MILLIGRAM(S): at 23:40

## 2019-06-26 RX ADMIN — CEFTRIAXONE 100 MILLIGRAM(S): 500 INJECTION, POWDER, FOR SOLUTION INTRAMUSCULAR; INTRAVENOUS at 01:30

## 2019-06-26 RX ADMIN — Medication 100 MILLIGRAM(S): at 06:40

## 2019-06-26 NOTE — H&P ADULT - NSHPLABSRESULTS_GEN_ALL_CORE
LABS:                        14.1   9.2   )-----------( 123      ( 2019 06:45 )             42.5         141  |  107  |  17  ----------------------------<  127<H>  3.7   |  27  |  0.67    Ca    8.3<L>      2019 06:45  Mg     1.9         TPro  6.5  /  Alb  3.4  /  TBili  0.5  /  DBili  x   /  AST  14<L>  /  ALT  24  /  AlkPhos  56      PT/INR - ( 2019 02:00 )   PT: 12.3 sec;   INR: 1.10          PTT - ( 2019 02:00 )  PTT:33.9 sec  Urinalysis Basic - ( 2019 22:40 )    Color: Yellow / Appearance: Clear / S.015 / pH: x  Gluc: x / Ketone: 40 mg/dL  / Bili: NEGATIVE / Urobili: 0.2 E.U./dL   Blood: x / Protein: 30 mg/dL / Nitrite: NEGATIVE   Leuk Esterase: NEGATIVE / RBC: < 5 /HPF / WBC < 5 /HPF   Sq Epi: x / Non Sq Epi: 0-5 /HPF / Bacteria: Present /HPF        RADIOLOGY & ADDITIONAL STUDIES:    IMPRESSION:   1.  Right paracolic gutter inflammatory stranding with abnormal CT   appearance of the gallbladder suspicious for cholecystitis. Appendicitis   is felt less likely as the appendix is of normal size.  2.  Prostatomegaly.  3.  Distended urinary bladder with mild bladder wall thickening, may   represent chronic bladder outlet obstruction. Correlate with urinalysis   to exclude urinary tract infection.

## 2019-06-26 NOTE — ED PROVIDER NOTE - CLINICAL SUMMARY MEDICAL DECISION MAKING FREE TEXT BOX
abdominal pain, will order labs, CT abdomen, analgesia prn, IVF, will sign out to night team pending re-eval

## 2019-06-26 NOTE — ED ADULT NURSE REASSESSMENT NOTE - NS ED NURSE REASSESS COMMENT FT1
pt resting in stretcher awaiting clean bed at [facility]. pt calm and cooperative. lights dimmed and comrfort measures provided.

## 2019-06-26 NOTE — H&P ADULT - ASSESSMENT
79M w/ DM, HTN, parkinsons p/w abdominal pain x2days and urinary symptoms w/ concern for acute cholecystitis v cystitis    NPO/IVF  Pain control  HIDA  RUQ US  Urology consult 79M w/ DM, HTN, parkinsons p/w abdominal pain x2days and urinary symptoms w/ concern for acute cholecystitis v cystitis    NPO/IVF  Pain control  Ceftriaxone/flagyl  HIDA  RUQ US  Urology consult

## 2019-06-26 NOTE — ED PROVIDER NOTE - OBJECTIVE STATEMENT
80 yo M with pmhx DM, HTN, no previous abdominal surgeries presents c/o right sided abdominal pain sharp today with associated nausea. no vomiting, no fever. no dysuria or hematuria. no chills. last BM yesterday. no hx SBO. no melena or hematochezia. no chest pain or dyspnea.

## 2019-06-26 NOTE — H&P ADULT - NSICDXPASTMEDICALHX_GEN_ALL_CORE_FT
PAST MEDICAL HISTORY:  Depression     DM2 (diabetes mellitus, type 2) since 5/16/2017    Hyperlipidemia     Hypertension     Parkinsons

## 2019-06-26 NOTE — ED PROVIDER NOTE - ATTENDING CONTRIBUTION TO CARE
79 yom pw abd pain, right sided, w/ nausea.  no fc.  no vomiting.      agree w/ PA, right abd tenderness, no guarding, will check labs, CT eval for acute surg pathology

## 2019-06-26 NOTE — H&P ADULT - HISTORY OF PRESENT ILLNESS
79 year old man with PMH of DM, HTN, and recent dx of Parkinson’s Disease presenting with 2 days of constant abdominal pain. 2 days ago he experienced pain that began while resting that has not gone away. His pain has reached a 9/10 on pain scale, currently at a 3/10. He did not take any medications for his pain, and reports that nothing seemed to make the pain better such as moving around or trying to stand up. He denies nausea, vomiting, and changes in the caliber of his stool. He has been afebrile. He believed his pain was from a gastroenteritis and reports he did not eat anything unusual. He is UTD with his vaccinations (due for a Shingles booster), and has not had any recent travel. Furthermore he reports 6 weeks of sudden severe sense of urgency to urinate w/ associated burning and has had several episodes of incontinence over the last 6 weeks. He denies changes in color of his urine  Last colonoscopy: 5 years prior performed at [facility] noted to have multiple benign polyps that were removed. Per patient he was not told to follow up.

## 2019-06-26 NOTE — ED PROVIDER NOTE - PROGRESS NOTE DETAILS
CT abdomen shows gallbladder is distended with possible gallstone at the neck. Gallbladder wall measures 3 mm. Inflammatory stranding around the right paracolic gutter suspicious for acute cholecystitis, no US available at this time, spoke with Dr. Richards who accepts patient for regional surgical bed, IV antibiotics, NPO. patient agrees with plan

## 2019-06-26 NOTE — ED PROVIDER NOTE - PHYSICAL EXAMINATION
CONSTITUTIONAL: Well-developed; well-nourished; in no acute distress.  	SKIN: Skin is warm and dry, no acute rash.  	HEAD: Normocephalic; atraumatic.  	EYES: PERRL, EOM intact; conjunctiva and sclera clear.  	ENT: No nasal discharge; airway clear.  no tonsillar swelling or exudates, uvula midline, airway patent  	NECK: Supple; non tender.  	CARD: S1, S2 normal; no murmurs, gallops, or rubs. Regular rate and rhythm.  	RESP: No wheezes, rales or rhonchi.  	ABD: Normal bowel sounds; soft; non-distended; +R abdominal tenderness. no guarding or rebound. no cvat bilaterally.   	EXT: Normal ROM. No clubbing, cyanosis or edema.  	NEURO: Alert, oriented. Grossly unremarkable.  PSYCH: Cooperative, appropriate.

## 2019-06-27 ENCOUNTER — TRANSCRIPTION ENCOUNTER (OUTPATIENT)
Age: 79
End: 2019-06-27

## 2019-06-27 VITALS
DIASTOLIC BLOOD PRESSURE: 68 MMHG | HEART RATE: 69 BPM | TEMPERATURE: 98 F | SYSTOLIC BLOOD PRESSURE: 106 MMHG | OXYGEN SATURATION: 95 % | RESPIRATION RATE: 18 BRPM

## 2019-06-27 LAB
ALBUMIN SERPL ELPH-MCNC: 3.5 G/DL — SIGNIFICANT CHANGE UP (ref 3.3–5)
ALP SERPL-CCNC: 48 U/L — SIGNIFICANT CHANGE UP (ref 40–120)
ALT FLD-CCNC: 15 U/L — SIGNIFICANT CHANGE UP (ref 10–45)
ANION GAP SERPL CALC-SCNC: 9 MMOL/L — SIGNIFICANT CHANGE UP (ref 5–17)
AST SERPL-CCNC: 17 U/L — SIGNIFICANT CHANGE UP (ref 10–40)
BILIRUB SERPL-MCNC: 0.4 MG/DL — SIGNIFICANT CHANGE UP (ref 0.2–1.2)
BUN SERPL-MCNC: 12 MG/DL — SIGNIFICANT CHANGE UP (ref 7–23)
CALCIUM SERPL-MCNC: 8.7 MG/DL — SIGNIFICANT CHANGE UP (ref 8.4–10.5)
CHLORIDE SERPL-SCNC: 103 MMOL/L — SIGNIFICANT CHANGE UP (ref 96–108)
CO2 SERPL-SCNC: 25 MMOL/L — SIGNIFICANT CHANGE UP (ref 22–31)
CREAT SERPL-MCNC: 0.63 MG/DL — SIGNIFICANT CHANGE UP (ref 0.5–1.3)
EXTRA GREEN TOP TUBE: SIGNIFICANT CHANGE UP
EXTRA LAVENDER TOP TUBE: SIGNIFICANT CHANGE UP
GLUCOSE BLDC GLUCOMTR-MCNC: 108 MG/DL — HIGH (ref 70–99)
GLUCOSE BLDC GLUCOMTR-MCNC: 146 MG/DL — HIGH (ref 70–99)
GLUCOSE SERPL-MCNC: 172 MG/DL — HIGH (ref 70–99)
HBA1C BLD-MCNC: 6.5 % — HIGH (ref 4–5.6)
HCT VFR BLD CALC: 42.4 % — SIGNIFICANT CHANGE UP (ref 39–50)
HGB BLD-MCNC: 13.4 G/DL — SIGNIFICANT CHANGE UP (ref 13–17)
MAGNESIUM SERPL-MCNC: 1.9 MG/DL — SIGNIFICANT CHANGE UP (ref 1.6–2.6)
MCHC RBC-ENTMCNC: 28.6 PG — SIGNIFICANT CHANGE UP (ref 27–34)
MCHC RBC-ENTMCNC: 31.6 GM/DL — LOW (ref 32–36)
MCV RBC AUTO: 90.4 FL — SIGNIFICANT CHANGE UP (ref 80–100)
NRBC # BLD: 0 /100 WBCS — SIGNIFICANT CHANGE UP (ref 0–0)
PHOSPHATE SERPL-MCNC: 2.5 MG/DL — SIGNIFICANT CHANGE UP (ref 2.5–4.5)
PLATELET # BLD AUTO: 126 K/UL — LOW (ref 150–400)
POTASSIUM SERPL-MCNC: 3.3 MMOL/L — LOW (ref 3.5–5.3)
POTASSIUM SERPL-SCNC: 3.3 MMOL/L — LOW (ref 3.5–5.3)
PROT SERPL-MCNC: 6.1 G/DL — SIGNIFICANT CHANGE UP (ref 6–8.3)
RBC # BLD: 4.69 M/UL — SIGNIFICANT CHANGE UP (ref 4.2–5.8)
RBC # FLD: 14 % — SIGNIFICANT CHANGE UP (ref 10.3–14.5)
SODIUM SERPL-SCNC: 137 MMOL/L — SIGNIFICANT CHANGE UP (ref 135–145)
WBC # BLD: 8.52 K/UL — SIGNIFICANT CHANGE UP (ref 3.8–10.5)
WBC # FLD AUTO: 8.52 K/UL — SIGNIFICANT CHANGE UP (ref 3.8–10.5)

## 2019-06-27 PROCEDURE — 85025 COMPLETE CBC W/AUTO DIFF WBC: CPT

## 2019-06-27 PROCEDURE — 84100 ASSAY OF PHOSPHORUS: CPT

## 2019-06-27 PROCEDURE — 83690 ASSAY OF LIPASE: CPT

## 2019-06-27 PROCEDURE — 81001 URINALYSIS AUTO W/SCOPE: CPT

## 2019-06-27 PROCEDURE — 74177 CT ABD & PELVIS W/CONTRAST: CPT

## 2019-06-27 PROCEDURE — 76705 ECHO EXAM OF ABDOMEN: CPT

## 2019-06-27 PROCEDURE — 85730 THROMBOPLASTIN TIME PARTIAL: CPT

## 2019-06-27 PROCEDURE — 96365 THER/PROPH/DIAG IV INF INIT: CPT | Mod: XU

## 2019-06-27 PROCEDURE — 96361 HYDRATE IV INFUSION ADD-ON: CPT

## 2019-06-27 PROCEDURE — 93005 ELECTROCARDIOGRAM TRACING: CPT | Mod: 76

## 2019-06-27 PROCEDURE — 99285 EMERGENCY DEPT VISIT HI MDM: CPT | Mod: 25

## 2019-06-27 PROCEDURE — 85610 PROTHROMBIN TIME: CPT

## 2019-06-27 PROCEDURE — 78226 HEPATOBILIARY SYSTEM IMAGING: CPT

## 2019-06-27 PROCEDURE — 83036 HEMOGLOBIN GLYCOSYLATED A1C: CPT

## 2019-06-27 PROCEDURE — 83605 ASSAY OF LACTIC ACID: CPT

## 2019-06-27 PROCEDURE — 82962 GLUCOSE BLOOD TEST: CPT

## 2019-06-27 PROCEDURE — A9537: CPT

## 2019-06-27 PROCEDURE — 96375 TX/PRO/DX INJ NEW DRUG ADDON: CPT

## 2019-06-27 PROCEDURE — 85027 COMPLETE CBC AUTOMATED: CPT

## 2019-06-27 PROCEDURE — 36415 COLL VENOUS BLD VENIPUNCTURE: CPT

## 2019-06-27 PROCEDURE — 84484 ASSAY OF TROPONIN QUANT: CPT

## 2019-06-27 PROCEDURE — 80053 COMPREHEN METABOLIC PANEL: CPT

## 2019-06-27 PROCEDURE — 83735 ASSAY OF MAGNESIUM: CPT

## 2019-06-27 PROCEDURE — 99231 SBSQ HOSP IP/OBS SF/LOW 25: CPT | Mod: GC

## 2019-06-27 RX ORDER — ATORVASTATIN CALCIUM 80 MG/1
1 TABLET, FILM COATED ORAL
Qty: 0 | Refills: 0 | DISCHARGE

## 2019-06-27 RX ADMIN — Medication 25 MILLIGRAM(S): at 14:39

## 2019-06-27 RX ADMIN — Medication 25 MILLIGRAM(S): at 07:05

## 2019-06-27 RX ADMIN — Medication 1000 MILLIGRAM(S): at 06:56

## 2019-06-27 RX ADMIN — CEFTRIAXONE 100 MILLIGRAM(S): 500 INJECTION, POWDER, FOR SOLUTION INTRAMUSCULAR; INTRAVENOUS at 01:28

## 2019-06-27 RX ADMIN — Medication 81 MILLIGRAM(S): at 12:06

## 2019-06-27 RX ADMIN — Medication 100 MILLIGRAM(S): at 07:05

## 2019-06-27 RX ADMIN — Medication 400 MILLIGRAM(S): at 06:41

## 2019-06-27 RX ADMIN — AMLODIPINE BESYLATE 2.5 MILLIGRAM(S): 2.5 TABLET ORAL at 07:05

## 2019-06-27 RX ADMIN — LISINOPRIL 20 MILLIGRAM(S): 2.5 TABLET ORAL at 07:05

## 2019-06-27 RX ADMIN — Medication 20 MILLIGRAM(S): at 12:05

## 2019-06-27 RX ADMIN — Medication 100 MILLIGRAM(S): at 07:06

## 2019-06-27 RX ADMIN — SODIUM CHLORIDE 50 MILLILITER(S): 9 INJECTION, SOLUTION INTRAVENOUS at 08:50

## 2019-06-27 RX ADMIN — Medication 100 MILLIGRAM(S): at 00:06

## 2019-06-27 NOTE — DISCHARGE NOTE PROVIDER - CARE PROVIDER_API CALL
Lynne Richards)  Surgery  Acute Care  [address] [place] [id number]  Phone: [phone]  Fax: [phone]  Follow Up Time:

## 2019-06-27 NOTE — PROGRESS NOTE ADULT - ASSESSMENT
79M w/ DM, HTN, parkinsons p/w abdominal pain x2days and urinary symptoms w/ concern for acute cholecystitis v cystitis    CLD  Pain control  Ceftriaxone/flagyl  Urology consult  Possible dc

## 2019-06-27 NOTE — DISCHARGE NOTE NURSING/CASE MANAGEMENT/SOCIAL WORK - NSDCDPATPORTLINK_GEN_ALL_CORE
You can access the [facility] Patient Portal, offered by [facility], by registering with the following website: [email or web address]

## 2019-06-27 NOTE — DISCHARGE NOTE PROVIDER - HOSPITAL COURSE
79M w/ DM, HTN, parkinsons p/w abdominal pain x2days and urinary symptoms w/ concern for acute cholecystitis v biliary colic. On imaging patient was not found to have acute cholecystitis and patients exam remained nonconcerning for acute choleycstitis and he remained hemodynamtically stable. Patient's hospital course was uncomplicated. Diet was advanced as tolerated and pain was well controlled on medication. On day of discharge, pt deemed stable and ready to return home with plan to follow up as an outpatient.

## 2019-06-27 NOTE — PROGRESS NOTE ADULT - SUBJECTIVE AND OBJECTIVE BOX
SUBJECTIVE: Feeling well, no N/V, passing flatus, jovan CLD. Patient seen and examined bedside by chief resident.    amLODIPine   Tablet 2.5 milliGRAM(s) Oral daily  aspirin  chewable 81 milliGRAM(s) Oral daily  cefTRIAXone   IVPB 1000 milliGRAM(s) IV Intermittent every 24 hours  lisinopril 20 milliGRAM(s) Oral daily  metroNIDAZOLE  IVPB 500 milliGRAM(s) IV Intermittent every 8 hours      Vital Signs Last 24 Hrs  T(C): 37 (2019 05:11), Max: 37.2 (2019 09:21)  T(F): 98.6 (2019 05:11), Max: 98.9 (2019 09:21)  HR: 83 (2019 05:11) (78 - 86)  BP: 116/73 (2019 05:11) (102/62 - 152/83)  BP(mean): --  RR: 17 (2019 05:11) (17 - 20)  SpO2: 94% (2019 05:11) (94% - 97%)  I&O's Detail    2019 07:01  -  2019 07:00  --------------------------------------------------------  IN:    IV PiggyBack: 350 mL    lactated ringers.: 600 mL  Total IN: 950 mL    OUT:    Voided: 250 mL  Total OUT: 250 mL    Total NET: 700 mL      2019 07:01  -  2019 07:48  --------------------------------------------------------  IN:    IV PiggyBack: 100 mL  Total IN: 100 mL    OUT:  Total OUT: 0 mL    Total NET: 100 mL          General: NAD, resting comfortably in bed  C/V: NSR  Pulm: Nonlabored breathing, no respiratory distress  Abd: soft, NT/ND.  Extrem: SCDs in place        LABS:                        14.1   9.2   )-----------( 123      ( 2019 06:45 )             42.5     -    141  |  107  |  17  ----------------------------<  127<H>  3.7   |  27  |  0.67    Ca    8.3<L>      2019 06:45  Mg     1.9         TPro  6.5  /  Alb  3.4  /  TBili  0.5  /  DBili  x   /  AST  14<L>  /  ALT  24  /  AlkPhos  56      PT/INR - ( 2019 02:00 )   PT: 12.3 sec;   INR: 1.10          PTT - ( 2019 02:00 )  PTT:33.9 sec  Urinalysis Basic - ( 2019 22:40 )    Color: Yellow / Appearance: Clear / S.015 / pH: x  Gluc: x / Ketone: 40 mg/dL  / Bili: NEGATIVE / Urobili: 0.2 E.U./dL   Blood: x / Protein: 30 mg/dL / Nitrite: NEGATIVE   Leuk Esterase: NEGATIVE / RBC: < 5 /HPF / WBC < 5 /HPF   Sq Epi: x / Non Sq Epi: 0-5 /HPF / Bacteria: Present /HPF
The patient was moved [facility] step [facility]. Complaining of mild pain at site of previous chest tube location and discomfort with cough.  Afebrile. Eating well. Oriented x 3.  Supple neck. No oral mucositis. Clear lung fields, no pleural friction rubs. Cor: Tachycardia to 112, regular, normal S1,S2, no pericardial rubs.  Abdomen: soft, no hepatomegaly. Functional iliostomy.  Unchanged evaluation of PICC line.  No lrg swelling.  Neuro; non-focal.  CBC Full  -  ( 26 Jun 2019 06:45 )  WBC Count : 9.2 K/uL  RBC Count : 4.87 M/uL  Hemoglobin : 14.1 g/dL  Hematocrit : 42.5 %  Platelet Count - Automated : 123 K/uL  Mean Cell Volume : 87.3 fL  Mean Cell Hemoglobin : 29.0 pg  Mean Cell Hemoglobin Concentration : 33.2 g/dL  Auto Neutrophil # : x  Auto Lymphocyte # : x  Auto Monocyte # : x  Auto Eosinophil # : x  Auto Basophil # : x  Auto Neutrophil % : x  Auto Lymphocyte % : x  Auto Monocyte % : x  Auto Eosinophil % : x  Auto Basophil % : x  06-26    141  |  107  |  17  ----------------------------<  127<H>  3.7   |  27  |  0.67    Ca    8.3<L>      26 Jun 2019 06:45  Mg     1.9     06-25    TPro  6.5  /  Alb  3.4  /  TBili  0.5  /  DBili  x   /  AST  14<L>  /  ALT  24  /  AlkPhos  56  06-26  A/P: 1. New right pleural effusion, probably malignant. Cytology of pleural fluid : still pending, mainly neutrophils and lymphocytes seen. Pleural nodules present on chest CT.         2. Pericardial effusion; appears loculated on the CT.  Patient appears comfortable, repeat ECCHO could be done as outpatient.         3. Anemia of chemotherapy: on PRN  SQ Procrit/B12.  Received PRBC transfusion within 10 days of current admission.         4. Stage IV Colon cancer: resume post discharge chemotherapy with Lonsurf Stivarga (Regorafenib), both oral meds and iv Gemzar/Oxaliplatin. CEA is decreasing.         5. Bacterial right pneumonitis; completed 5 days of iv Cefepime and required Zithromax         6. Insulin dependent diabetes: on PRN insulin and oral hypoglycemic agents.         7. Questionable need for home TPN, patient is now eating well         8. Code status: full code

## 2019-06-27 NOTE — DISCHARGE NOTE PROVIDER - NSDCFUADDINST_GEN_ALL_CORE_FT
Please follow up with Dr. Richards ([phone]) regarding plans for a possible future surgery. You may call the office to make an appointment at your earliest convenience. Additionally, please follow up with Urology Clinic ([phone]) for further management.    Please continue all of your home medications as prescribed. Additionally, you have been prescribed oral antibiotics. Please be sure to complete the entire course as directed.     Contact your doctor or go to the ER for fever > 101.5, chills, nausea, vomiting, chest pain, shortness of breath, or worsening abdominal pain.

## 2019-06-27 NOTE — PROGRESS NOTE ADULT - ATTENDING COMMENTS
78 yo M presenting with 1 day h/o lower abdominal pain, has since resolved, acalculous cholecystitis vs cystits  US showing gallbladder is mildly distended. No gallstones are seen. There was lack of visualization of the gallbladder on HIDA scan. These findings could represent acalculus cholecystitis.  On clinical exam he is afebrile, normal WBC, denies abdominal pain, and tolerating a diet  Plan is for discharge to home to complete total 5 day course Abx, start flomax per urology  Follow up as outpatient

## 2019-07-05 DIAGNOSIS — F32.9 MAJOR DEPRESSIVE DISORDER, SINGLE EPISODE, UNSPECIFIED: ICD-10-CM

## 2019-07-05 DIAGNOSIS — I10 ESSENTIAL (PRIMARY) HYPERTENSION: ICD-10-CM

## 2019-07-05 DIAGNOSIS — Z79.02 LONG TERM (CURRENT) USE OF ANTITHROMBOTICS/ANTIPLATELETS: ICD-10-CM

## 2019-07-05 DIAGNOSIS — Z79.84 LONG TERM (CURRENT) USE OF ORAL HYPOGLYCEMIC DRUGS: ICD-10-CM

## 2019-07-05 DIAGNOSIS — G20 PARKINSON'S DISEASE: ICD-10-CM

## 2019-07-05 DIAGNOSIS — Z87.891 PERSONAL HISTORY OF NICOTINE DEPENDENCE: ICD-10-CM

## 2019-07-05 DIAGNOSIS — E11.9 TYPE 2 DIABETES MELLITUS WITHOUT COMPLICATIONS: ICD-10-CM

## 2019-07-05 DIAGNOSIS — Z79.82 LONG TERM (CURRENT) USE OF ASPIRIN: ICD-10-CM

## 2019-07-05 DIAGNOSIS — R10.9 UNSPECIFIED ABDOMINAL PAIN: ICD-10-CM

## 2019-07-05 DIAGNOSIS — N40.0 BENIGN PROSTATIC HYPERPLASIA WITHOUT LOWER URINARY TRACT SYMPTOMS: ICD-10-CM

## 2019-07-09 PROBLEM — I10 ESSENTIAL (PRIMARY) HYPERTENSION: Chronic | Status: ACTIVE | Noted: 2019-06-26

## 2019-07-09 PROBLEM — F32.9 MAJOR DEPRESSIVE DISORDER, SINGLE EPISODE, UNSPECIFIED: Chronic | Status: ACTIVE | Noted: 2019-06-26

## 2019-07-09 PROBLEM — G20 PARKINSON'S DISEASE: Chronic | Status: ACTIVE | Noted: 2019-06-26

## 2019-07-09 PROBLEM — E78.5 HYPERLIPIDEMIA, UNSPECIFIED: Chronic | Status: ACTIVE | Noted: 2019-06-26

## 2019-07-10 ENCOUNTER — APPOINTMENT (OUTPATIENT)
Dept: PSYCHIATRY | Facility: CLINIC | Age: 79
End: 2019-07-10
Payer: MEDICARE

## 2019-07-10 PROCEDURE — 90832 PSYTX W PT 30 MINUTES: CPT

## 2019-07-15 ENCOUNTER — EMERGENCY (EMERGENCY)
Facility: HOSPITAL | Age: 79
LOS: 1 days | Discharge: HOME | End: 2019-07-15
Attending: EMERGENCY MEDICINE | Admitting: EMERGENCY MEDICINE
Payer: MEDICARE

## 2019-07-15 VITALS
DIASTOLIC BLOOD PRESSURE: 78 MMHG | HEART RATE: 98 BPM | RESPIRATION RATE: 18 BRPM | TEMPERATURE: 98 F | OXYGEN SATURATION: 96 % | WEIGHT: 188.05 LBS | SYSTOLIC BLOOD PRESSURE: 160 MMHG

## 2019-07-15 DIAGNOSIS — E11.9 TYPE 2 DIABETES MELLITUS WITHOUT COMPLICATIONS: ICD-10-CM

## 2019-07-15 DIAGNOSIS — I10 ESSENTIAL (PRIMARY) HYPERTENSION: ICD-10-CM

## 2019-07-15 DIAGNOSIS — Z79.84 LONG TERM (CURRENT) USE OF ORAL HYPOGLYCEMIC DRUGS: ICD-10-CM

## 2019-07-15 DIAGNOSIS — R21 RASH AND OTHER NONSPECIFIC SKIN ERUPTION: ICD-10-CM

## 2019-07-15 DIAGNOSIS — N48.1 BALANITIS: ICD-10-CM

## 2019-07-15 DIAGNOSIS — B35.6 TINEA CRURIS: ICD-10-CM

## 2019-07-15 PROCEDURE — 99283 EMERGENCY DEPT VISIT LOW MDM: CPT

## 2019-07-15 RX ORDER — FLUCONAZOLE 150 MG/1
150 TABLET ORAL ONCE
Refills: 0 | Status: COMPLETED | OUTPATIENT
Start: 2019-07-15 | End: 2019-07-15

## 2019-07-15 RX ORDER — MUPIROCIN 20 MG/G
1 OINTMENT TOPICAL
Qty: 15 | Refills: 0
Start: 2019-07-15 | End: 2019-07-19

## 2019-07-15 RX ADMIN — FLUCONAZOLE 150 MILLIGRAM(S): 150 TABLET ORAL at 11:01

## 2019-07-15 NOTE — ED PROVIDER NOTE - CLINICAL SUMMARY MEDICAL DECISION MAKING FREE TEXT BOX
78 yo male with well controlled DM, now incontinent using [employer] Diapers.  Symptoms and exam consistent with candidal infection - balanitis and tinea cruris.  Also with mild folliculitis.  Treat with topical clotrimazole and bactroban.  Oral dose of diflucan.  Conservative management discussed with the patient in detail.  Close Dermatology follow up encouraged.  Strict ED return instructions discussed in detail and patient given the opportunity to ask any questions about their discharge diagnosis and instructions

## 2019-07-15 NOTE — ED PROVIDER NOTE - GENITOURINARY, MLM
+Hyperpigmented areas in the space between the thigh and the pelvis, left worse than right. +Appearance is beefy and erythematous, with area of inflamed folliculitis. +Flaky rash on the glands and shaft of the penis w/ discharge as well. No tenderness. + Hyperpigmented areas in the skin fold between the thigh and the pubic area, left worse than right. +Appearance is beefy and erythematous.  In the L inner thigh area erythematous papular rash present. +white flaky/erythematous rash on the glans and proximal shaft of the penis. No penile discharge.  Scrotum and testicular exam normal.

## 2019-07-15 NOTE — ED PROVIDER NOTE - CARE PROVIDER_API CALL
Vaishali Santamaria)  Dermatology  [address] [place] [address], [place] [address]  Phone: [phone]  Fax: [phone]  Follow Up Time:

## 2019-07-15 NOTE — ED ADULT TRIAGE NOTE - CHIEF COMPLAINT QUOTE
Pt presents to ED with c/o rash to his groin area & penile discomfort - hx of urinary incontinence, had a recent hospitalization and developed incontinent dermatitis that went untreated. He states the rash has improved since his discharge after applying triamcinolone cream to it. He denies fevers, fatigue or weakness.

## 2019-07-15 NOTE — ED PROVIDER NOTE - OBJECTIVE STATEMENT
79 y o male with PMHx of Parkinson's disease, DM, vertigo, and HTN presents to ED with c/o penile rash. Pt last seen in [facility] for c/o abd pain. CT abd showed gallbladder distension with possible gallstones at the neck. Suspected acute cholecystitis. Pt was admitted to [facility]. While at [facility], pt was r/o for cholecystitis and treated for UTI. DC'd on Augmentin on June 27. Pt presents today for erythematosus rash in groin area. States that he has become incontinent and requires use of adult diapers. Pt believes that worsened rash might be due to the diaper use. Notes mild associated pain at the tip of the penis. No fevers, chills, abd pain, burning urination, testicular pain, or other sx. 79 y o male with PMHx of Parkinson's disease, DM, vertigo, and HTN presents to ED with c/o penile rash. Pt last seen in [facility] for c/o abd pain. CT abd showed gallbladder distension with possible gallstones at the neck. Suspected acute cholecystitis. Pt was admitted to [facility]. While at [facility], pt was r/o for cholecystitis and treated for UTI. DC'd on Augmentin on June 27. Pt presents today for erythematosus rash in groin area. States that he has become incontinent and requires use of adult diapers x 4 months. Pt believes that worsened rash might be due to the diaper use. Notes mild associated pain at the tip of the penis. No fevers, chills, abd pain, burning urination, testicular pain, or other sx.

## 2019-07-15 NOTE — ED PROVIDER NOTE - SKIN, MLM
+Hyperpigmented areas in the space between the thigh and the pelvis, left worse than right. +Appearance is beefy and erythematous, with area of inflamed folliculitis. +Flaky rash on the glands and shaft of the penis w/ discharge as well. No tenderness.

## 2019-07-15 NOTE — ED PROVIDER NOTE - CPE EDP RESP NORM
Dermatology Rooming Note    Nancy Benz's goals for this visit include:   Chief Complaint   Patient presents with     RECHECK     skin check       Is a scribe okay for this visit:yes    Are records needed for this visit(If yes, obtain release of information): No     Vitals: There were no vitals taken for this visit.    Referring Provider:  No referring provider defined for this encounter.      Terra Holly LPN     normal...

## 2019-07-15 NOTE — ED ADULT NURSE NOTE - PMH
Depression    Depression    DM2 (diabetes mellitus, type 2)  since 5/16/2017  Hyperlipidemia    Hypertension    Melanoma    Parkinsons    Tinnitus of left ear    Vertigo

## 2019-07-19 ENCOUNTER — APPOINTMENT (OUTPATIENT)
Dept: PSYCHIATRY | Facility: CLINIC | Age: 79
End: 2019-07-19

## 2019-07-23 PROBLEM — R42 DIZZINESS AND GIDDINESS: Chronic | Status: ACTIVE | Noted: 2019-07-17

## 2019-07-23 PROBLEM — F32.9 MAJOR DEPRESSIVE DISORDER, SINGLE EPISODE, UNSPECIFIED: Chronic | Status: ACTIVE | Noted: 2019-07-17

## 2019-07-23 PROBLEM — H93.12 TINNITUS, LEFT EAR: Chronic | Status: ACTIVE | Noted: 2019-07-17

## 2019-07-23 PROBLEM — C43.9 MALIGNANT MELANOMA OF SKIN, UNSPECIFIED: Chronic | Status: ACTIVE | Noted: 2019-07-17

## 2019-07-26 ENCOUNTER — APPOINTMENT (OUTPATIENT)
Dept: PSYCHIATRY | Facility: CLINIC | Age: 79
End: 2019-07-26
Payer: MEDICARE

## 2019-07-26 PROCEDURE — 99214 OFFICE O/P EST MOD 30 MIN: CPT

## 2019-07-26 PROCEDURE — 90832 PSYTX W PT 30 MINUTES: CPT

## 2019-08-16 ENCOUNTER — APPOINTMENT (OUTPATIENT)
Dept: PSYCHIATRY | Facility: CLINIC | Age: 79
End: 2019-08-16
Payer: MEDICARE

## 2019-08-16 PROCEDURE — 90832 PSYTX W PT 30 MINUTES: CPT

## 2019-08-23 ENCOUNTER — APPOINTMENT (OUTPATIENT)
Dept: PSYCHIATRY | Facility: CLINIC | Age: 79
End: 2019-08-23

## 2019-09-06 ENCOUNTER — APPOINTMENT (OUTPATIENT)
Dept: PSYCHIATRY | Facility: CLINIC | Age: 79
End: 2019-09-06
Payer: MEDICARE

## 2019-09-06 PROCEDURE — 90832 PSYTX W PT 30 MINUTES: CPT

## 2019-09-11 ENCOUNTER — APPOINTMENT (OUTPATIENT)
Dept: PSYCHIATRY | Facility: CLINIC | Age: 79
End: 2019-09-11
Payer: MEDICARE

## 2019-09-11 PROCEDURE — 99214 OFFICE O/P EST MOD 30 MIN: CPT

## 2019-09-20 ENCOUNTER — APPOINTMENT (OUTPATIENT)
Dept: PSYCHIATRY | Facility: CLINIC | Age: 79
End: 2019-09-20
Payer: MEDICARE

## 2019-09-20 PROCEDURE — 90832 PSYTX W PT 30 MINUTES: CPT

## 2019-09-27 ENCOUNTER — APPOINTMENT (OUTPATIENT)
Dept: PSYCHIATRY | Facility: CLINIC | Age: 79
End: 2019-09-27
Payer: MEDICARE

## 2019-09-27 PROCEDURE — 90832 PSYTX W PT 30 MINUTES: CPT

## 2019-10-04 ENCOUNTER — APPOINTMENT (OUTPATIENT)
Dept: PSYCHIATRY | Facility: CLINIC | Age: 79
End: 2019-10-04
Payer: MEDICARE

## 2019-10-04 PROCEDURE — 90832 PSYTX W PT 30 MINUTES: CPT

## 2019-10-07 ENCOUNTER — APPOINTMENT (OUTPATIENT)
Dept: PSYCHIATRY | Facility: CLINIC | Age: 79
End: 2019-10-07

## 2019-10-11 ENCOUNTER — APPOINTMENT (OUTPATIENT)
Dept: PSYCHIATRY | Facility: CLINIC | Age: 79
End: 2019-10-11

## 2019-10-18 ENCOUNTER — APPOINTMENT (OUTPATIENT)
Dept: PSYCHIATRY | Facility: CLINIC | Age: 79
End: 2019-10-18

## 2019-10-21 ENCOUNTER — APPOINTMENT (OUTPATIENT)
Dept: PSYCHIATRY | Facility: CLINIC | Age: 79
End: 2019-10-21

## 2019-11-01 ENCOUNTER — APPOINTMENT (OUTPATIENT)
Dept: PSYCHIATRY | Facility: CLINIC | Age: 79
End: 2019-11-01

## 2019-11-08 ENCOUNTER — APPOINTMENT (OUTPATIENT)
Dept: PSYCHIATRY | Facility: CLINIC | Age: 79
End: 2019-11-08

## 2019-11-11 ENCOUNTER — APPOINTMENT (OUTPATIENT)
Dept: PSYCHIATRY | Facility: CLINIC | Age: 79
End: 2019-11-11

## 2019-11-22 ENCOUNTER — APPOINTMENT (OUTPATIENT)
Dept: PSYCHIATRY | Facility: CLINIC | Age: 79
End: 2019-11-22

## 2019-12-02 ENCOUNTER — APPOINTMENT (OUTPATIENT)
Dept: PSYCHIATRY | Facility: CLINIC | Age: 79
End: 2019-12-02

## 2019-12-05 ENCOUNTER — EMERGENCY (EMERGENCY)
Facility: HOSPITAL | Age: 79
LOS: 1 days | Discharge: ANOTHER HOSPITAL | End: 2019-12-05
Attending: EMERGENCY MEDICINE | Admitting: EMERGENCY MEDICINE
Payer: MEDICARE

## 2019-12-05 VITALS
HEIGHT: 68 IN | TEMPERATURE: 99 F | RESPIRATION RATE: 17 BRPM | WEIGHT: 186.07 LBS | SYSTOLIC BLOOD PRESSURE: 148 MMHG | DIASTOLIC BLOOD PRESSURE: 94 MMHG | OXYGEN SATURATION: 95 % | HEART RATE: 67 BPM

## 2019-12-05 LAB
ALBUMIN SERPL ELPH-MCNC: 4 G/DL — SIGNIFICANT CHANGE UP (ref 3.4–5)
ALP SERPL-CCNC: 55 U/L — SIGNIFICANT CHANGE UP (ref 40–120)
ALT FLD-CCNC: 24 U/L — SIGNIFICANT CHANGE UP (ref 12–42)
AMPHET UR-MCNC: NEGATIVE — SIGNIFICANT CHANGE UP
ANION GAP SERPL CALC-SCNC: 8 MMOL/L — LOW (ref 9–16)
ANION GAP SERPL CALC-SCNC: 9 MMOL/L — SIGNIFICANT CHANGE UP (ref 9–16)
APPEARANCE UR: CLEAR — SIGNIFICANT CHANGE UP
AST SERPL-CCNC: 45 U/L — HIGH (ref 15–37)
BACTERIA # UR AUTO: PRESENT /HPF
BARBITURATES UR SCN-MCNC: NEGATIVE — SIGNIFICANT CHANGE UP
BASOPHILS # BLD AUTO: 0.02 K/UL — SIGNIFICANT CHANGE UP (ref 0–0.2)
BASOPHILS NFR BLD AUTO: 0.2 % — SIGNIFICANT CHANGE UP (ref 0–2)
BENZODIAZ UR-MCNC: NEGATIVE — SIGNIFICANT CHANGE UP
BILIRUB SERPL-MCNC: 0.6 MG/DL — SIGNIFICANT CHANGE UP (ref 0.2–1.2)
BILIRUB UR-MCNC: NEGATIVE — SIGNIFICANT CHANGE UP
BUN SERPL-MCNC: 15 MG/DL — SIGNIFICANT CHANGE UP (ref 7–23)
BUN SERPL-MCNC: 16 MG/DL — SIGNIFICANT CHANGE UP (ref 7–23)
CALCIUM SERPL-MCNC: 9.1 MG/DL — SIGNIFICANT CHANGE UP (ref 8.5–10.5)
CALCIUM SERPL-MCNC: 9.4 MG/DL — SIGNIFICANT CHANGE UP (ref 8.5–10.5)
CHLORIDE SERPL-SCNC: 102 MMOL/L — SIGNIFICANT CHANGE UP (ref 96–108)
CHLORIDE SERPL-SCNC: 104 MMOL/L — SIGNIFICANT CHANGE UP (ref 96–108)
CO2 SERPL-SCNC: 27 MMOL/L — SIGNIFICANT CHANGE UP (ref 22–31)
CO2 SERPL-SCNC: 29 MMOL/L — SIGNIFICANT CHANGE UP (ref 22–31)
COCAINE METAB.OTHER UR-MCNC: NEGATIVE — SIGNIFICANT CHANGE UP
COLOR SPEC: YELLOW — SIGNIFICANT CHANGE UP
CREAT SERPL-MCNC: 0.62 MG/DL — SIGNIFICANT CHANGE UP (ref 0.5–1.3)
CREAT SERPL-MCNC: 0.69 MG/DL — SIGNIFICANT CHANGE UP (ref 0.5–1.3)
DIFF PNL FLD: ABNORMAL
EOSINOPHIL # BLD AUTO: 0.07 K/UL — SIGNIFICANT CHANGE UP (ref 0–0.5)
EOSINOPHIL NFR BLD AUTO: 0.8 % — SIGNIFICANT CHANGE UP (ref 0–6)
EPI CELLS # UR: SIGNIFICANT CHANGE UP /HPF (ref 0–5)
GLUCOSE BLDC GLUCOMTR-MCNC: 118 MG/DL — HIGH (ref 70–99)
GLUCOSE SERPL-MCNC: 104 MG/DL — HIGH (ref 70–99)
GLUCOSE SERPL-MCNC: 116 MG/DL — HIGH (ref 70–99)
GLUCOSE UR QL: >=1000
HCT VFR BLD CALC: 46.6 % — SIGNIFICANT CHANGE UP (ref 39–50)
HGB BLD-MCNC: 15.7 G/DL — SIGNIFICANT CHANGE UP (ref 13–17)
IMM GRANULOCYTES NFR BLD AUTO: 0.6 % — SIGNIFICANT CHANGE UP (ref 0–1.5)
KETONES UR-MCNC: NEGATIVE — SIGNIFICANT CHANGE UP
LACTATE SERPL-SCNC: 1.8 MMOL/L — SIGNIFICANT CHANGE UP (ref 0.4–2)
LEUKOCYTE ESTERASE UR-ACNC: NEGATIVE — SIGNIFICANT CHANGE UP
LIDOCAIN IGE QN: 245 U/L — SIGNIFICANT CHANGE UP (ref 73–393)
LYMPHOCYTES # BLD AUTO: 2.44 K/UL — SIGNIFICANT CHANGE UP (ref 1–3.3)
LYMPHOCYTES # BLD AUTO: 29.2 % — SIGNIFICANT CHANGE UP (ref 13–44)
MCHC RBC-ENTMCNC: 29.5 PG — SIGNIFICANT CHANGE UP (ref 27–34)
MCHC RBC-ENTMCNC: 33.7 GM/DL — SIGNIFICANT CHANGE UP (ref 32–36)
MCV RBC AUTO: 87.4 FL — SIGNIFICANT CHANGE UP (ref 80–100)
METHADONE UR-MCNC: NEGATIVE — SIGNIFICANT CHANGE UP
MONOCYTES # BLD AUTO: 0.51 K/UL — SIGNIFICANT CHANGE UP (ref 0–0.9)
MONOCYTES NFR BLD AUTO: 6.1 % — SIGNIFICANT CHANGE UP (ref 2–14)
NEUTROPHILS # BLD AUTO: 5.26 K/UL — SIGNIFICANT CHANGE UP (ref 1.8–7.4)
NEUTROPHILS NFR BLD AUTO: 63.1 % — SIGNIFICANT CHANGE UP (ref 43–77)
NITRITE UR-MCNC: NEGATIVE — SIGNIFICANT CHANGE UP
NRBC # BLD: 0 /100 WBCS — SIGNIFICANT CHANGE UP (ref 0–0)
OPIATES UR-MCNC: NEGATIVE — SIGNIFICANT CHANGE UP
PCP SPEC-MCNC: SIGNIFICANT CHANGE UP
PCP UR-MCNC: NEGATIVE — SIGNIFICANT CHANGE UP
PH UR: 7 — SIGNIFICANT CHANGE UP (ref 5–8)
PLATELET # BLD AUTO: 152 K/UL — SIGNIFICANT CHANGE UP (ref 150–400)
POTASSIUM SERPL-MCNC: 3.8 MMOL/L — SIGNIFICANT CHANGE UP (ref 3.5–5.3)
POTASSIUM SERPL-MCNC: 5.6 MMOL/L — HIGH (ref 3.5–5.3)
POTASSIUM SERPL-SCNC: 3.8 MMOL/L — SIGNIFICANT CHANGE UP (ref 3.5–5.3)
POTASSIUM SERPL-SCNC: 5.6 MMOL/L — HIGH (ref 3.5–5.3)
PROT SERPL-MCNC: 7.7 G/DL — SIGNIFICANT CHANGE UP (ref 6.4–8.2)
PROT UR-MCNC: NEGATIVE MG/DL — SIGNIFICANT CHANGE UP
RBC # BLD: 5.33 M/UL — SIGNIFICANT CHANGE UP (ref 4.2–5.8)
RBC # FLD: 13.9 % — SIGNIFICANT CHANGE UP (ref 10.3–14.5)
RBC CASTS # UR COMP ASSIST: < 5 /HPF — SIGNIFICANT CHANGE UP
SODIUM SERPL-SCNC: 138 MMOL/L — SIGNIFICANT CHANGE UP (ref 132–145)
SODIUM SERPL-SCNC: 141 MMOL/L — SIGNIFICANT CHANGE UP (ref 132–145)
SP GR SPEC: 1.01 — SIGNIFICANT CHANGE UP (ref 1–1.03)
THC UR QL: NEGATIVE — SIGNIFICANT CHANGE UP
TROPONIN I SERPL-MCNC: <0.017 NG/ML — LOW (ref 0.02–0.06)
UROBILINOGEN FLD QL: 0.2 E.U./DL — SIGNIFICANT CHANGE UP
WBC # BLD: 8.35 K/UL — SIGNIFICANT CHANGE UP (ref 3.8–10.5)
WBC # FLD AUTO: 8.35 K/UL — SIGNIFICANT CHANGE UP (ref 3.8–10.5)
WBC UR QL: < 5 /HPF — SIGNIFICANT CHANGE UP

## 2019-12-05 PROCEDURE — 70450 CT HEAD/BRAIN W/O DYE: CPT | Mod: 26

## 2019-12-05 PROCEDURE — 74177 CT ABD & PELVIS W/CONTRAST: CPT | Mod: 26

## 2019-12-05 PROCEDURE — 99285 EMERGENCY DEPT VISIT HI MDM: CPT

## 2019-12-05 PROCEDURE — 71260 CT THORAX DX C+: CPT | Mod: 26

## 2019-12-05 PROCEDURE — 93010 ELECTROCARDIOGRAM REPORT: CPT

## 2019-12-05 RX ORDER — MORPHINE SULFATE 50 MG/1
4 CAPSULE, EXTENDED RELEASE ORAL ONCE
Refills: 0 | Status: DISCONTINUED | OUTPATIENT
Start: 2019-12-05 | End: 2019-12-05

## 2019-12-05 RX ORDER — SODIUM CHLORIDE 9 MG/ML
1000 INJECTION INTRAMUSCULAR; INTRAVENOUS; SUBCUTANEOUS ONCE
Refills: 0 | Status: COMPLETED | OUTPATIENT
Start: 2019-12-05 | End: 2019-12-05

## 2019-12-05 RX ORDER — CEFTRIAXONE 500 MG/1
1000 INJECTION, POWDER, FOR SOLUTION INTRAMUSCULAR; INTRAVENOUS ONCE
Refills: 0 | Status: COMPLETED | OUTPATIENT
Start: 2019-12-05 | End: 2019-12-05

## 2019-12-05 RX ORDER — OXYCODONE AND ACETAMINOPHEN 5; 325 MG/1; MG/1
1 TABLET ORAL ONCE
Refills: 0 | Status: DISCONTINUED | OUTPATIENT
Start: 2019-12-05 | End: 2019-12-05

## 2019-12-05 RX ADMIN — MORPHINE SULFATE 4 MILLIGRAM(S): 50 CAPSULE, EXTENDED RELEASE ORAL at 18:57

## 2019-12-05 RX ADMIN — MORPHINE SULFATE 4 MILLIGRAM(S): 50 CAPSULE, EXTENDED RELEASE ORAL at 20:07

## 2019-12-05 RX ADMIN — MORPHINE SULFATE 4 MILLIGRAM(S): 50 CAPSULE, EXTENDED RELEASE ORAL at 20:06

## 2019-12-05 RX ADMIN — CEFTRIAXONE 100 MILLIGRAM(S): 500 INJECTION, POWDER, FOR SOLUTION INTRAMUSCULAR; INTRAVENOUS at 21:52

## 2019-12-05 RX ADMIN — OXYCODONE AND ACETAMINOPHEN 1 TABLET(S): 5; 325 TABLET ORAL at 23:00

## 2019-12-05 RX ADMIN — SODIUM CHLORIDE 1000 MILLILITER(S): 9 INJECTION INTRAMUSCULAR; INTRAVENOUS; SUBCUTANEOUS at 18:58

## 2019-12-05 NOTE — ED PROVIDER NOTE - PHYSICAL EXAMINATION
lungs heart normal  abd : right sided lateral abd/flank diffuse tenderness no superifical palpatio with no echcymosis or crepitus also tednerness in right loewr chest oltherwise soft no rebound   msk normal   skin normal  head atraumatic cpsine no trauma or ecchymosuis

## 2019-12-05 NOTE — ED PROVIDER NOTE - CROS ED GI ALL NEG
negative...

## 2019-12-05 NOTE — ED PROVIDER NOTE - PROGRESS NOTE DETAILS
Pt noted that while urinating, he felt some discomfort. Admitted to Hx of urinary infections in the past. 3 contiguous rib fractures. Will transfer to trauma center for pain center incentive spirometry. Case accepted by Dr Daniels at [facility]. Pt aware of transfer.

## 2019-12-05 NOTE — ED PROVIDER NOTE - CHPI ED SYMPTOMS NEG
no HA, no neck pain, no nausea, no extremity pain, no neck pain, no back pain/no tingling/no numbness/no vomiting no tingling/no numbness/no vomiting/no HA, no neck pain, no nausea, no extremity pain, no neck pain, no back pain, no nausea, no chest pain, no SOB

## 2019-12-05 NOTE — ED PROVIDER NOTE - OBJECTIVE STATEMENT
78 y/o M with PMHx Parkinson's, Type II Diabetes with diabetic polyneuropathy, HLD, and hypovitaminosis D on Sinemet, Lancets, Lorazepam, Metformin, Paxil, Quinapril, Rosuvastatin, Amlodipine, Aspirin, Empagliflozin, and Eplerenone presents to ED s/p syncopal episode. Pt syncopized earlier today from a standing position while in his office, losing consciousness for a few seconds and hitting his right side on a coffee table in the process. He now has pain to his right lower chest/flank from the incident as well as an "electric" pain radiating from his head down his right side. Furthermore, while lying in his stretcher today in the ED, he had an additional syncopal episode. Denies head trauma, HA, neck pain, extremity pain, back pain, no numbness, no tingling, N/V, chest pain, or SOB. Pt reports having frequent syncopal episodes, most recently while reading a book in his bed this past week as well as a Hx of UTIs. He was put on Sinemet, a new medication for him, on November 20th prescribed by his cardiologist Dr Dylon Preciado [phone]).

## 2019-12-05 NOTE — ED PROVIDER NOTE - GASTROINTESTINAL, MLM
Right sided lateral abd/flank diffuse tenderness to superficial palpation with no ecchymosis or crepitus. Also tenderness in right lower chest. Otherwise soft, no rebound.

## 2019-12-05 NOTE — ED PROVIDER NOTE - CLINICAL SUMMARY MEDICAL DECISION MAKING FREE TEXT BOX
Pt with fall, unclear if short syncope episode. No involuntary movements or incontinence or tongue biting. Low suspicion for seizure. Has had similar prior episodes, no chest pain or SOB. Will get blood work to r/o cardiac etiology. Also trauma workup given direct trauma to lower chest and flank. Reassess.

## 2019-12-05 NOTE — ED ADULT NURSE NOTE - OBJECTIVE STATEMENT
chronic dizziness; patient had episode of dizziness and fell onto table hitting shoulder and back; denies head injury

## 2019-12-05 NOTE — ED ADULT TRIAGE NOTE - CHIEF COMPLAINT QUOTE
Pt BIBA for syncopal episode. Pt with h/o Parkinson, htn, DM c.o chronic dizziness today pt felt sx and synopsized hitting his left shoulder and flank on table. Pt c.o pain of a 5/10 at this time. No sign of head trauma or deformity to shoulder noted.

## 2019-12-06 ENCOUNTER — APPOINTMENT (OUTPATIENT)
Dept: PSYCHIATRY | Facility: CLINIC | Age: 79
End: 2019-12-06

## 2019-12-06 VITALS
TEMPERATURE: 98 F | HEART RATE: 71 BPM | RESPIRATION RATE: 18 BRPM | OXYGEN SATURATION: 96 % | SYSTOLIC BLOOD PRESSURE: 138 MMHG | DIASTOLIC BLOOD PRESSURE: 72 MMHG

## 2019-12-06 RX ORDER — HYDROMORPHONE HYDROCHLORIDE 2 MG/ML
0.5 INJECTION INTRAMUSCULAR; INTRAVENOUS; SUBCUTANEOUS ONCE
Refills: 0 | Status: DISCONTINUED | OUTPATIENT
Start: 2019-12-06 | End: 2019-12-06

## 2019-12-06 RX ADMIN — HYDROMORPHONE HYDROCHLORIDE 0.5 MILLIGRAM(S): 2 INJECTION INTRAMUSCULAR; INTRAVENOUS; SUBCUTANEOUS at 03:07

## 2019-12-11 DIAGNOSIS — I10 ESSENTIAL (PRIMARY) HYPERTENSION: ICD-10-CM

## 2019-12-11 DIAGNOSIS — R55 SYNCOPE AND COLLAPSE: ICD-10-CM
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
## 2019-12-11 DIAGNOSIS — E11.9 TYPE 2 DIABETES MELLITUS WITHOUT COMPLICATIONS: ICD-10-CM

## 2019-12-11 DIAGNOSIS — Z79.899 OTHER LONG TERM (CURRENT) DRUG THERAPY: ICD-10-CM
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
## 2019-12-11 DIAGNOSIS — Z79.84 LONG TERM (CURRENT) USE OF ORAL HYPOGLYCEMIC DRUGS: ICD-10-CM

## 2019-12-11 DIAGNOSIS — S22.41XA MULTIPLE FRACTURES OF RIBS, RIGHT SIDE, INITIAL ENCOUNTER FOR CLOSED FRACTURE: ICD-10-CM
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
## 2019-12-30 ENCOUNTER — APPOINTMENT (OUTPATIENT)
Dept: PSYCHIATRY | Facility: CLINIC | Age: 79
End: 2019-12-30

## 2020-01-20 ENCOUNTER — FORM ENCOUNTER (OUTPATIENT)
Age: 80
End: 2020-01-20

## 2020-01-21 ENCOUNTER — APPOINTMENT (OUTPATIENT)
Dept: PSYCHIATRY | Facility: CLINIC | Age: 80
End: 2020-01-21

## 2020-01-24 ENCOUNTER — APPOINTMENT (OUTPATIENT)
Dept: PSYCHIATRY | Facility: CLINIC | Age: 80
End: 2020-01-24

## 2020-01-31 ENCOUNTER — APPOINTMENT (OUTPATIENT)
Dept: PSYCHIATRY | Facility: CLINIC | Age: 80
End: 2020-01-31

## 2020-02-11 ENCOUNTER — APPOINTMENT (OUTPATIENT)
Dept: PSYCHIATRY | Facility: CLINIC | Age: 80
End: 2020-02-11

## 2020-02-14 ENCOUNTER — APPOINTMENT (OUTPATIENT)
Dept: PSYCHIATRY | Facility: CLINIC | Age: 80
End: 2020-02-14

## 2020-02-21 ENCOUNTER — APPOINTMENT (OUTPATIENT)
Dept: PSYCHIATRY | Facility: CLINIC | Age: 80
End: 2020-02-21

## 2020-03-06 ENCOUNTER — APPOINTMENT (OUTPATIENT)
Dept: PSYCHIATRY | Facility: CLINIC | Age: 80
End: 2020-03-06

## 2020-03-22 ENCOUNTER — FORM ENCOUNTER (OUTPATIENT)
Age: 80
End: 2020-03-22

## 2020-04-10 ENCOUNTER — APPOINTMENT (OUTPATIENT)
Dept: PSYCHIATRY | Facility: CLINIC | Age: 80
End: 2020-04-10

## 2020-04-12 ENCOUNTER — FORM ENCOUNTER (OUTPATIENT)
Age: 80
End: 2020-04-12

## 2020-04-13 ENCOUNTER — APPOINTMENT (OUTPATIENT)
Dept: PSYCHIATRY | Facility: CLINIC | Age: 80
End: 2020-04-13

## 2020-04-23 ENCOUNTER — APPOINTMENT (OUTPATIENT)
Dept: PSYCHIATRY | Facility: CLINIC | Age: 80
End: 2020-04-23

## 2020-05-01 ENCOUNTER — APPOINTMENT (OUTPATIENT)
Dept: PSYCHIATRY | Facility: CLINIC | Age: 80
End: 2020-05-01

## 2020-05-01 ENCOUNTER — OUTPATIENT (OUTPATIENT)
Dept: OUTPATIENT SERVICES | Facility: HOSPITAL | Age: 80
LOS: 1 days | Discharge: HOME | End: 2020-05-01
Payer: MEDICARE

## 2020-05-03 ENCOUNTER — FORM ENCOUNTER (OUTPATIENT)
Age: 80
End: 2020-05-03

## 2020-05-08 ENCOUNTER — APPOINTMENT (OUTPATIENT)
Dept: PSYCHIATRY | Facility: CLINIC | Age: 80
End: 2020-05-08

## 2020-05-08 DIAGNOSIS — F41.1 GENERALIZED ANXIETY DISORDER: ICD-10-CM

## 2020-05-08 DIAGNOSIS — F06.4 ANXIETY DISORDER DUE TO KNOWN PHYSIOLOGICAL CONDITION: ICD-10-CM

## 2020-05-08 DIAGNOSIS — F34.1 DYSTHYMIC DISORDER: ICD-10-CM

## 2020-05-11 ENCOUNTER — APPOINTMENT (OUTPATIENT)
Dept: PSYCHIATRY | Facility: CLINIC | Age: 80
End: 2020-05-11

## 2020-05-11 PROCEDURE — 90832 PSYTX W PT 30 MINUTES: CPT | Mod: 95

## 2020-05-15 ENCOUNTER — APPOINTMENT (OUTPATIENT)
Dept: PSYCHIATRY | Facility: CLINIC | Age: 80
End: 2020-05-15

## 2020-05-15 PROCEDURE — 90832 PSYTX W PT 30 MINUTES: CPT | Mod: 95

## 2020-05-22 ENCOUNTER — APPOINTMENT (OUTPATIENT)
Dept: PSYCHIATRY | Facility: CLINIC | Age: 80
End: 2020-05-22

## 2020-05-22 PROCEDURE — 90832 PSYTX W PT 30 MINUTES: CPT | Mod: 95

## 2020-05-28 ENCOUNTER — FORM ENCOUNTER (OUTPATIENT)
Age: 80
End: 2020-05-28

## 2020-05-29 ENCOUNTER — APPOINTMENT (OUTPATIENT)
Dept: PSYCHIATRY | Facility: CLINIC | Age: 80
End: 2020-05-29

## 2020-05-29 PROCEDURE — 99443: CPT | Mod: 95

## 2020-06-05 ENCOUNTER — APPOINTMENT (OUTPATIENT)
Dept: PSYCHIATRY | Facility: CLINIC | Age: 80
End: 2020-06-05

## 2020-06-12 ENCOUNTER — APPOINTMENT (OUTPATIENT)
Dept: PSYCHIATRY | Facility: CLINIC | Age: 80
End: 2020-06-12

## 2020-06-15 ENCOUNTER — FORM ENCOUNTER (OUTPATIENT)
Age: 80
End: 2020-06-15

## 2020-06-16 ENCOUNTER — APPOINTMENT (OUTPATIENT)
Dept: PSYCHIATRY | Facility: CLINIC | Age: 80
End: 2020-06-16

## 2020-06-16 PROCEDURE — 99443: CPT | Mod: 95

## 2020-06-19 ENCOUNTER — APPOINTMENT (OUTPATIENT)
Dept: PSYCHIATRY | Facility: CLINIC | Age: 80
End: 2020-06-19

## 2020-06-22 ENCOUNTER — APPOINTMENT (OUTPATIENT)
Dept: PSYCHIATRY | Facility: CLINIC | Age: 80
End: 2020-06-22

## 2020-06-22 PROCEDURE — 90832 PSYTX W PT 30 MINUTES: CPT | Mod: 95

## 2020-06-29 ENCOUNTER — FORM ENCOUNTER (OUTPATIENT)
Age: 80
End: 2020-06-29

## 2020-06-29 ENCOUNTER — APPOINTMENT (OUTPATIENT)
Dept: PSYCHIATRY | Facility: CLINIC | Age: 80
End: 2020-06-29

## 2020-06-29 PROCEDURE — 90832 PSYTX W PT 30 MINUTES: CPT | Mod: 95

## 2020-06-30 ENCOUNTER — APPOINTMENT (OUTPATIENT)
Dept: PSYCHIATRY | Facility: CLINIC | Age: 80
End: 2020-06-30

## 2020-06-30 PROCEDURE — 99443: CPT | Mod: 95

## 2020-07-06 ENCOUNTER — APPOINTMENT (OUTPATIENT)
Dept: PSYCHIATRY | Facility: CLINIC | Age: 80
End: 2020-07-06

## 2020-07-06 PROCEDURE — 90832 PSYTX W PT 30 MINUTES: CPT | Mod: 95

## 2020-07-12 ENCOUNTER — FORM ENCOUNTER (OUTPATIENT)
Age: 80
End: 2020-07-12

## 2020-07-13 ENCOUNTER — APPOINTMENT (OUTPATIENT)
Dept: PSYCHIATRY | Facility: CLINIC | Age: 80
End: 2020-07-13

## 2020-07-13 PROCEDURE — 90832 PSYTX W PT 30 MINUTES: CPT | Mod: 95

## 2020-07-20 ENCOUNTER — APPOINTMENT (OUTPATIENT)
Dept: PSYCHIATRY | Facility: CLINIC | Age: 80
End: 2020-07-20

## 2020-07-26 ENCOUNTER — FORM ENCOUNTER (OUTPATIENT)
Age: 80
End: 2020-07-26

## 2020-07-27 ENCOUNTER — APPOINTMENT (OUTPATIENT)
Dept: PSYCHIATRY | Facility: CLINIC | Age: 80
End: 2020-07-27

## 2020-07-27 ENCOUNTER — FORM ENCOUNTER (OUTPATIENT)
Age: 80
End: 2020-07-27

## 2020-07-27 PROCEDURE — 90832 PSYTX W PT 30 MINUTES: CPT | Mod: 95

## 2020-07-28 ENCOUNTER — APPOINTMENT (OUTPATIENT)
Dept: PSYCHIATRY | Facility: CLINIC | Age: 80
End: 2020-07-28

## 2020-07-28 PROCEDURE — 99443: CPT | Mod: 95

## 2020-08-03 ENCOUNTER — APPOINTMENT (OUTPATIENT)
Dept: PSYCHIATRY | Facility: CLINIC | Age: 80
End: 2020-08-03

## 2020-08-10 ENCOUNTER — APPOINTMENT (OUTPATIENT)
Dept: PSYCHIATRY | Facility: CLINIC | Age: 80
End: 2020-08-10

## 2020-08-17 ENCOUNTER — APPOINTMENT (OUTPATIENT)
Dept: PSYCHIATRY | Facility: CLINIC | Age: 80
End: 2020-08-17

## 2020-08-24 ENCOUNTER — FORM ENCOUNTER (OUTPATIENT)
Age: 80
End: 2020-08-24

## 2020-08-24 ENCOUNTER — APPOINTMENT (OUTPATIENT)
Dept: PSYCHIATRY | Facility: CLINIC | Age: 80
End: 2020-08-24

## 2020-08-25 ENCOUNTER — APPOINTMENT (OUTPATIENT)
Dept: PSYCHIATRY | Facility: CLINIC | Age: 80
End: 2020-08-25

## 2020-08-25 PROCEDURE — 99443: CPT | Mod: 95

## 2020-08-31 ENCOUNTER — APPOINTMENT (OUTPATIENT)
Dept: PSYCHIATRY | Facility: CLINIC | Age: 80
End: 2020-08-31

## 2020-09-13 ENCOUNTER — FORM ENCOUNTER (OUTPATIENT)
Age: 80
End: 2020-09-13

## 2020-09-14 ENCOUNTER — APPOINTMENT (OUTPATIENT)
Dept: PSYCHIATRY | Facility: CLINIC | Age: 80
End: 2020-09-14

## 2020-09-14 PROCEDURE — 90832 PSYTX W PT 30 MINUTES: CPT | Mod: 95

## 2020-09-20 ENCOUNTER — FORM ENCOUNTER (OUTPATIENT)
Age: 80
End: 2020-09-20

## 2020-09-21 ENCOUNTER — APPOINTMENT (OUTPATIENT)
Dept: PSYCHIATRY | Facility: CLINIC | Age: 80
End: 2020-09-21

## 2020-09-21 PROCEDURE — 99443: CPT | Mod: 95

## 2020-09-21 PROCEDURE — 90832 PSYTX W PT 30 MINUTES: CPT | Mod: 95

## 2020-09-28 ENCOUNTER — APPOINTMENT (OUTPATIENT)
Dept: PSYCHIATRY | Facility: CLINIC | Age: 80
End: 2020-09-28

## 2020-10-04 ENCOUNTER — FORM ENCOUNTER (OUTPATIENT)
Age: 80
End: 2020-10-04

## 2020-10-05 ENCOUNTER — APPOINTMENT (OUTPATIENT)
Dept: PSYCHIATRY | Facility: CLINIC | Age: 80
End: 2020-10-05

## 2020-10-05 PROCEDURE — 90832 PSYTX W PT 30 MINUTES: CPT | Mod: 95

## 2020-10-12 ENCOUNTER — APPOINTMENT (OUTPATIENT)
Dept: PSYCHIATRY | Facility: CLINIC | Age: 80
End: 2020-10-12

## 2020-10-18 ENCOUNTER — FORM ENCOUNTER (OUTPATIENT)
Age: 80
End: 2020-10-18

## 2020-10-19 ENCOUNTER — APPOINTMENT (OUTPATIENT)
Dept: PSYCHIATRY | Facility: CLINIC | Age: 80
End: 2020-10-19

## 2020-10-19 PROCEDURE — 90832 PSYTX W PT 30 MINUTES: CPT | Mod: 95

## 2020-10-19 PROCEDURE — 99443: CPT | Mod: 95

## 2020-10-22 ENCOUNTER — NON-APPOINTMENT (OUTPATIENT)
Age: 80
End: 2020-10-22

## 2020-10-25 ENCOUNTER — FORM ENCOUNTER (OUTPATIENT)
Age: 80
End: 2020-10-25

## 2020-10-26 ENCOUNTER — FORM ENCOUNTER (OUTPATIENT)
Age: 80
End: 2020-10-26

## 2020-10-26 ENCOUNTER — APPOINTMENT (OUTPATIENT)
Dept: PSYCHIATRY | Facility: CLINIC | Age: 80
End: 2020-10-26

## 2020-10-26 PROCEDURE — 90832 PSYTX W PT 30 MINUTES: CPT | Mod: 95

## 2020-10-28 ENCOUNTER — FORM ENCOUNTER (OUTPATIENT)
Age: 80
End: 2020-10-28

## 2020-10-29 ENCOUNTER — APPOINTMENT (OUTPATIENT)
Dept: PSYCHIATRY | Facility: CLINIC | Age: 80
End: 2020-10-29

## 2020-10-29 PROCEDURE — 99442: CPT | Mod: 95

## 2020-11-02 ENCOUNTER — APPOINTMENT (OUTPATIENT)
Dept: PSYCHIATRY | Facility: CLINIC | Age: 80
End: 2020-11-02

## 2020-11-02 ENCOUNTER — OUTPATIENT (OUTPATIENT)
Dept: OUTPATIENT SERVICES | Facility: HOSPITAL | Age: 80
LOS: 1 days | Discharge: HOME | End: 2020-11-02
Payer: MEDICARE

## 2020-11-08 ENCOUNTER — FORM ENCOUNTER (OUTPATIENT)
Age: 80
End: 2020-11-08

## 2020-11-09 ENCOUNTER — APPOINTMENT (OUTPATIENT)
Dept: PSYCHIATRY | Facility: CLINIC | Age: 80
End: 2020-11-09

## 2020-11-09 PROCEDURE — 90832 PSYTX W PT 30 MINUTES: CPT | Mod: 95

## 2020-11-10 ENCOUNTER — APPOINTMENT (OUTPATIENT)
Dept: PSYCHIATRY | Facility: CLINIC | Age: 80
End: 2020-11-10

## 2020-11-10 PROCEDURE — 99214 OFFICE O/P EST MOD 30 MIN: CPT | Mod: 95

## 2020-11-15 ENCOUNTER — FORM ENCOUNTER (OUTPATIENT)
Age: 80
End: 2020-11-15

## 2020-11-16 ENCOUNTER — APPOINTMENT (OUTPATIENT)
Dept: PSYCHIATRY | Facility: CLINIC | Age: 80
End: 2020-11-16

## 2020-11-16 PROCEDURE — 90832 PSYTX W PT 30 MINUTES: CPT | Mod: 95

## 2020-11-23 ENCOUNTER — FORM ENCOUNTER (OUTPATIENT)
Age: 80
End: 2020-11-23

## 2020-11-24 ENCOUNTER — APPOINTMENT (OUTPATIENT)
Dept: PSYCHIATRY | Facility: CLINIC | Age: 80
End: 2020-11-24

## 2020-11-24 PROCEDURE — 90832 PSYTX W PT 30 MINUTES: CPT | Mod: 95

## 2020-11-30 ENCOUNTER — APPOINTMENT (OUTPATIENT)
Dept: PSYCHIATRY | Facility: CLINIC | Age: 80
End: 2020-11-30

## 2020-12-03 ENCOUNTER — FORM ENCOUNTER (OUTPATIENT)
Age: 80
End: 2020-12-03

## 2020-12-04 PROCEDURE — 90832 PSYTX W PT 30 MINUTES: CPT | Mod: 95

## 2020-12-07 ENCOUNTER — APPOINTMENT (OUTPATIENT)
Dept: PSYCHIATRY | Facility: CLINIC | Age: 80
End: 2020-12-07

## 2020-12-08 ENCOUNTER — APPOINTMENT (OUTPATIENT)
Dept: PSYCHIATRY | Facility: CLINIC | Age: 80
End: 2020-12-08

## 2020-12-13 ENCOUNTER — FORM ENCOUNTER (OUTPATIENT)
Age: 80
End: 2020-12-13

## 2020-12-14 ENCOUNTER — APPOINTMENT (OUTPATIENT)
Dept: PSYCHIATRY | Facility: CLINIC | Age: 80
End: 2020-12-14

## 2020-12-14 ENCOUNTER — FORM ENCOUNTER (OUTPATIENT)
Age: 80
End: 2020-12-14

## 2020-12-14 PROCEDURE — 90832 PSYTX W PT 30 MINUTES: CPT | Mod: 95

## 2020-12-15 ENCOUNTER — FORM ENCOUNTER (OUTPATIENT)
Age: 80
End: 2020-12-15

## 2020-12-16 ENCOUNTER — APPOINTMENT (OUTPATIENT)
Dept: PSYCHIATRY | Facility: CLINIC | Age: 80
End: 2020-12-16

## 2020-12-16 DIAGNOSIS — F31.9 BIPOLAR DISORDER, UNSPECIFIED: ICD-10-CM

## 2020-12-16 PROCEDURE — 99215 OFFICE O/P EST HI 40 MIN: CPT | Mod: 95

## 2020-12-16 RX ORDER — MIRTAZAPINE 45 MG/1
1 TABLET, ORALLY DISINTEGRATING ORAL
Qty: 30 | Refills: 0
Start: 2020-12-16 | End: 2021-01-14

## 2020-12-20 ENCOUNTER — FORM ENCOUNTER (OUTPATIENT)
Age: 80
End: 2020-12-20

## 2020-12-21 ENCOUNTER — APPOINTMENT (OUTPATIENT)
Dept: PSYCHIATRY | Facility: CLINIC | Age: 80
End: 2020-12-21

## 2020-12-21 PROCEDURE — 90832 PSYTX W PT 30 MINUTES: CPT | Mod: 95

## 2020-12-28 ENCOUNTER — APPOINTMENT (OUTPATIENT)
Dept: PSYCHIATRY | Facility: CLINIC | Age: 80
End: 2020-12-28

## 2021-01-01 ENCOUNTER — APPOINTMENT (OUTPATIENT)
Dept: PSYCHIATRY | Facility: CLINIC | Age: 81
End: 2021-01-01
Payer: MEDICARE

## 2021-01-01 ENCOUNTER — NON-APPOINTMENT (OUTPATIENT)
Age: 81
End: 2021-01-01

## 2021-01-01 ENCOUNTER — APPOINTMENT (OUTPATIENT)
Dept: PSYCHIATRY | Facility: CLINIC | Age: 81
End: 2021-01-01

## 2021-01-01 ENCOUNTER — EMERGENCY (EMERGENCY)
Facility: HOSPITAL | Age: 81
LOS: 1 days | Discharge: HOME | End: 2021-01-01
Attending: EMERGENCY MEDICINE | Admitting: EMERGENCY MEDICINE
Payer: MEDICARE

## 2021-01-01 ENCOUNTER — APPOINTMENT (OUTPATIENT)
Dept: DERMATOLOGY | Facility: CLINIC | Age: 81
End: 2021-01-01
Payer: MEDICARE

## 2021-01-01 ENCOUNTER — EMERGENCY (EMERGENCY)
Facility: HOSPITAL | Age: 81
LOS: 1 days | Discharge: HOME | End: 2021-01-01
Admitting: EMERGENCY MEDICINE
Payer: MEDICARE

## 2021-01-01 VITALS
RESPIRATION RATE: 17 BRPM | WEIGHT: 160.06 LBS | DIASTOLIC BLOOD PRESSURE: 77 MMHG | SYSTOLIC BLOOD PRESSURE: 123 MMHG | OXYGEN SATURATION: 99 % | TEMPERATURE: 98 F | HEART RATE: 93 BPM | HEIGHT: 68 IN

## 2021-01-01 VITALS
RESPIRATION RATE: 18 BRPM | SYSTOLIC BLOOD PRESSURE: 121 MMHG | HEART RATE: 76 BPM | OXYGEN SATURATION: 98 % | DIASTOLIC BLOOD PRESSURE: 77 MMHG

## 2021-01-01 VITALS
TEMPERATURE: 99 F | SYSTOLIC BLOOD PRESSURE: 144 MMHG | RESPIRATION RATE: 18 BRPM | OXYGEN SATURATION: 96 % | HEART RATE: 92 BPM | DIASTOLIC BLOOD PRESSURE: 77 MMHG

## 2021-01-01 VITALS
OXYGEN SATURATION: 95 % | HEART RATE: 93 BPM | TEMPERATURE: 98 F | HEIGHT: 68 IN | SYSTOLIC BLOOD PRESSURE: 123 MMHG | WEIGHT: 190.04 LBS | DIASTOLIC BLOOD PRESSURE: 79 MMHG | RESPIRATION RATE: 18 BRPM

## 2021-01-01 DIAGNOSIS — E11.9 TYPE 2 DIABETES MELLITUS WITHOUT COMPLICATIONS: ICD-10-CM

## 2021-01-01 DIAGNOSIS — R00.2 PALPITATIONS: ICD-10-CM

## 2021-01-01 DIAGNOSIS — R20.2 PARESTHESIA OF SKIN: ICD-10-CM

## 2021-01-01 DIAGNOSIS — R53.1 WEAKNESS: ICD-10-CM

## 2021-01-01 DIAGNOSIS — G20 PARKINSON'S DISEASE: ICD-10-CM

## 2021-01-01 DIAGNOSIS — R25.1 TREMOR, UNSPECIFIED: ICD-10-CM

## 2021-01-01 DIAGNOSIS — F41.9 ANXIETY DISORDER, UNSPECIFIED: ICD-10-CM

## 2021-01-01 DIAGNOSIS — I10 ESSENTIAL (PRIMARY) HYPERTENSION: ICD-10-CM

## 2021-01-01 DIAGNOSIS — Z79.84 LONG TERM (CURRENT) USE OF ORAL HYPOGLYCEMIC DRUGS: ICD-10-CM

## 2021-01-01 DIAGNOSIS — Z79.82 LONG TERM (CURRENT) USE OF ASPIRIN: ICD-10-CM

## 2021-01-01 DIAGNOSIS — Z20.822 CONTACT WITH AND (SUSPECTED) EXPOSURE TO COVID-19: ICD-10-CM

## 2021-01-01 DIAGNOSIS — E78.5 HYPERLIPIDEMIA, UNSPECIFIED: ICD-10-CM

## 2021-01-01 DIAGNOSIS — I42.2 OTHER HYPERTROPHIC CARDIOMYOPATHY: ICD-10-CM

## 2021-01-01 LAB
ALBUMIN SERPL ELPH-MCNC: 3.8 G/DL — SIGNIFICANT CHANGE UP (ref 3.4–5)
ALBUMIN SERPL ELPH-MCNC: 4.2 G/DL — SIGNIFICANT CHANGE UP (ref 3.4–5)
ALP SERPL-CCNC: 55 U/L — SIGNIFICANT CHANGE UP (ref 40–120)
ALP SERPL-CCNC: 58 U/L — SIGNIFICANT CHANGE UP (ref 40–120)
ALT FLD-CCNC: 12 U/L — SIGNIFICANT CHANGE UP (ref 12–42)
ALT FLD-CCNC: 6 U/L — LOW (ref 12–42)
ANION GAP SERPL CALC-SCNC: 10 MMOL/L — SIGNIFICANT CHANGE UP (ref 9–16)
ANION GAP SERPL CALC-SCNC: 9 MMOL/L — SIGNIFICANT CHANGE UP (ref 9–16)
APPEARANCE UR: CLEAR — SIGNIFICANT CHANGE UP
AST SERPL-CCNC: 15 U/L — SIGNIFICANT CHANGE UP (ref 15–37)
AST SERPL-CCNC: 16 U/L — SIGNIFICANT CHANGE UP (ref 15–37)
BACTERIA # UR AUTO: PRESENT /HPF
BASOPHILS # BLD AUTO: 0.01 K/UL — SIGNIFICANT CHANGE UP (ref 0–0.2)
BASOPHILS # BLD AUTO: 0.03 K/UL — SIGNIFICANT CHANGE UP (ref 0–0.2)
BASOPHILS NFR BLD AUTO: 0.1 % — SIGNIFICANT CHANGE UP (ref 0–2)
BASOPHILS NFR BLD AUTO: 0.4 % — SIGNIFICANT CHANGE UP (ref 0–2)
BILIRUB SERPL-MCNC: 0.4 MG/DL — SIGNIFICANT CHANGE UP (ref 0.2–1.2)
BILIRUB SERPL-MCNC: 0.5 MG/DL — SIGNIFICANT CHANGE UP (ref 0.2–1.2)
BILIRUB UR-MCNC: NEGATIVE — SIGNIFICANT CHANGE UP
BUN SERPL-MCNC: 18 MG/DL — SIGNIFICANT CHANGE UP (ref 7–23)
BUN SERPL-MCNC: 19 MG/DL — SIGNIFICANT CHANGE UP (ref 7–23)
CALCIUM SERPL-MCNC: 9.2 MG/DL — SIGNIFICANT CHANGE UP (ref 8.5–10.5)
CALCIUM SERPL-MCNC: 9.3 MG/DL — SIGNIFICANT CHANGE UP (ref 8.5–10.5)
CHLORIDE SERPL-SCNC: 103 MMOL/L — SIGNIFICANT CHANGE UP (ref 96–108)
CHLORIDE SERPL-SCNC: 103 MMOL/L — SIGNIFICANT CHANGE UP (ref 96–108)
CO2 SERPL-SCNC: 27 MMOL/L — SIGNIFICANT CHANGE UP (ref 22–31)
CO2 SERPL-SCNC: 28 MMOL/L — SIGNIFICANT CHANGE UP (ref 22–31)
COLOR SPEC: YELLOW — SIGNIFICANT CHANGE UP
CREAT SERPL-MCNC: 0.78 MG/DL — SIGNIFICANT CHANGE UP (ref 0.5–1.3)
CREAT SERPL-MCNC: 0.85 MG/DL — SIGNIFICANT CHANGE UP (ref 0.5–1.3)
DIFF PNL FLD: ABNORMAL
EOSINOPHIL # BLD AUTO: 0.01 K/UL — SIGNIFICANT CHANGE UP (ref 0–0.5)
EOSINOPHIL # BLD AUTO: 0.03 K/UL — SIGNIFICANT CHANGE UP (ref 0–0.5)
EOSINOPHIL NFR BLD AUTO: 0.1 % — SIGNIFICANT CHANGE UP (ref 0–6)
EOSINOPHIL NFR BLD AUTO: 0.4 % — SIGNIFICANT CHANGE UP (ref 0–6)
EPI CELLS # UR: SIGNIFICANT CHANGE UP /HPF (ref 0–5)
GLUCOSE SERPL-MCNC: 130 MG/DL — HIGH (ref 70–99)
GLUCOSE SERPL-MCNC: 180 MG/DL — HIGH (ref 70–99)
GLUCOSE UR QL: NEGATIVE — SIGNIFICANT CHANGE UP
HCT VFR BLD CALC: 41.2 % — SIGNIFICANT CHANGE UP (ref 39–50)
HCT VFR BLD CALC: 46.2 % — SIGNIFICANT CHANGE UP (ref 39–50)
HGB BLD-MCNC: 14.1 G/DL — SIGNIFICANT CHANGE UP (ref 13–17)
HGB BLD-MCNC: 15.5 G/DL — SIGNIFICANT CHANGE UP (ref 13–17)
IMM GRANULOCYTES NFR BLD AUTO: 0.2 % — SIGNIFICANT CHANGE UP (ref 0–1.5)
IMM GRANULOCYTES NFR BLD AUTO: 0.4 % — SIGNIFICANT CHANGE UP (ref 0–1.5)
KETONES UR-MCNC: NEGATIVE — SIGNIFICANT CHANGE UP
LEUKOCYTE ESTERASE UR-ACNC: ABNORMAL
LYMPHOCYTES # BLD AUTO: 1.3 K/UL — SIGNIFICANT CHANGE UP (ref 1–3.3)
LYMPHOCYTES # BLD AUTO: 19.2 % — SIGNIFICANT CHANGE UP (ref 13–44)
LYMPHOCYTES # BLD AUTO: 2.24 K/UL — SIGNIFICANT CHANGE UP (ref 1–3.3)
LYMPHOCYTES # BLD AUTO: 27.2 % — SIGNIFICANT CHANGE UP (ref 13–44)
MCHC RBC-ENTMCNC: 29.6 PG — SIGNIFICANT CHANGE UP (ref 27–34)
MCHC RBC-ENTMCNC: 30 PG — SIGNIFICANT CHANGE UP (ref 27–34)
MCHC RBC-ENTMCNC: 33.5 GM/DL — SIGNIFICANT CHANGE UP (ref 32–36)
MCHC RBC-ENTMCNC: 34.2 GM/DL — SIGNIFICANT CHANGE UP (ref 32–36)
MCV RBC AUTO: 87.7 FL — SIGNIFICANT CHANGE UP (ref 80–100)
MCV RBC AUTO: 88.3 FL — SIGNIFICANT CHANGE UP (ref 80–100)
MONOCYTES # BLD AUTO: 0.45 K/UL — SIGNIFICANT CHANGE UP (ref 0–0.9)
MONOCYTES # BLD AUTO: 0.54 K/UL — SIGNIFICANT CHANGE UP (ref 0–0.9)
MONOCYTES NFR BLD AUTO: 6.6 % — SIGNIFICANT CHANGE UP (ref 2–14)
MONOCYTES NFR BLD AUTO: 6.6 % — SIGNIFICANT CHANGE UP (ref 2–14)
NEUTROPHILS # BLD AUTO: 4.98 K/UL — SIGNIFICANT CHANGE UP (ref 1.8–7.4)
NEUTROPHILS # BLD AUTO: 5.37 K/UL — SIGNIFICANT CHANGE UP (ref 1.8–7.4)
NEUTROPHILS NFR BLD AUTO: 65.2 % — SIGNIFICANT CHANGE UP (ref 43–77)
NEUTROPHILS NFR BLD AUTO: 73.6 % — SIGNIFICANT CHANGE UP (ref 43–77)
NITRITE UR-MCNC: NEGATIVE — SIGNIFICANT CHANGE UP
NRBC # BLD: 0 /100 WBCS — SIGNIFICANT CHANGE UP (ref 0–0)
NRBC # BLD: 0 /100 WBCS — SIGNIFICANT CHANGE UP (ref 0–0)
PH UR: 6 — SIGNIFICANT CHANGE UP (ref 5–8)
PLATELET # BLD AUTO: 157 K/UL — SIGNIFICANT CHANGE UP (ref 150–400)
PLATELET # BLD AUTO: 170 K/UL — SIGNIFICANT CHANGE UP (ref 150–400)
POTASSIUM SERPL-MCNC: 4 MMOL/L — SIGNIFICANT CHANGE UP (ref 3.5–5.3)
POTASSIUM SERPL-MCNC: 4 MMOL/L — SIGNIFICANT CHANGE UP (ref 3.5–5.3)
POTASSIUM SERPL-SCNC: 4 MMOL/L — SIGNIFICANT CHANGE UP (ref 3.5–5.3)
POTASSIUM SERPL-SCNC: 4 MMOL/L — SIGNIFICANT CHANGE UP (ref 3.5–5.3)
PROT SERPL-MCNC: 6.9 G/DL — SIGNIFICANT CHANGE UP (ref 6.4–8.2)
PROT SERPL-MCNC: 7.3 G/DL — SIGNIFICANT CHANGE UP (ref 6.4–8.2)
PROT UR-MCNC: NEGATIVE MG/DL — SIGNIFICANT CHANGE UP
RBC # BLD: 4.7 M/UL — SIGNIFICANT CHANGE UP (ref 4.2–5.8)
RBC # BLD: 5.23 M/UL — SIGNIFICANT CHANGE UP (ref 4.2–5.8)
RBC # FLD: 13.3 % — SIGNIFICANT CHANGE UP (ref 10.3–14.5)
RBC # FLD: 13.8 % — SIGNIFICANT CHANGE UP (ref 10.3–14.5)
RBC CASTS # UR COMP ASSIST: < 5 /HPF — SIGNIFICANT CHANGE UP
SARS-COV-2 RNA SPEC QL NAA+PROBE: SIGNIFICANT CHANGE UP
SODIUM SERPL-SCNC: 139 MMOL/L — SIGNIFICANT CHANGE UP (ref 132–145)
SODIUM SERPL-SCNC: 141 MMOL/L — SIGNIFICANT CHANGE UP (ref 132–145)
SP GR SPEC: 1.01 — SIGNIFICANT CHANGE UP (ref 1–1.03)
TROPONIN I SERPL-MCNC: <0.017 NG/ML — LOW (ref 0.02–0.06)
UROBILINOGEN FLD QL: 0.2 E.U./DL — SIGNIFICANT CHANGE UP
WBC # BLD: 6.78 K/UL — SIGNIFICANT CHANGE UP (ref 3.8–10.5)
WBC # BLD: 8.23 K/UL — SIGNIFICANT CHANGE UP (ref 3.8–10.5)
WBC # FLD AUTO: 6.78 K/UL — SIGNIFICANT CHANGE UP (ref 3.8–10.5)
WBC # FLD AUTO: 8.23 K/UL — SIGNIFICANT CHANGE UP (ref 3.8–10.5)
WBC UR QL: ABNORMAL /HPF

## 2021-01-01 PROCEDURE — 70450 CT HEAD/BRAIN W/O DYE: CPT | Mod: 26,MH

## 2021-01-01 PROCEDURE — G2212 PROLONG OUTPT/OFFICE VIS: CPT | Mod: 95

## 2021-01-01 PROCEDURE — 99284 EMERGENCY DEPT VISIT MOD MDM: CPT

## 2021-01-01 PROCEDURE — 90832 PSYTX W PT 30 MINUTES: CPT | Mod: 95

## 2021-01-01 PROCEDURE — 93010 ELECTROCARDIOGRAM REPORT: CPT

## 2021-01-01 PROCEDURE — 99215 OFFICE O/P EST HI 40 MIN: CPT | Mod: 95

## 2021-01-01 PROCEDURE — 99285 EMERGENCY DEPT VISIT HI MDM: CPT

## 2021-01-01 PROCEDURE — 11900 INJECT SKIN LESIONS </W 7: CPT

## 2021-01-01 PROCEDURE — 99203 OFFICE O/P NEW LOW 30 MIN: CPT | Mod: 25

## 2021-01-01 RX ORDER — CLONAZEPAM 0.25 MG/1
0.25 TABLET, ORALLY DISINTEGRATING ORAL AT BEDTIME
Qty: 10 | Refills: 0 | Status: DISCONTINUED | COMMUNITY
Start: 2021-08-11 | End: 2021-01-01

## 2021-01-01 RX ORDER — ZALEPLON 5 MG/1
5 CAPSULE ORAL DAILY
Qty: 30 | Refills: 4 | Status: DISCONTINUED | COMMUNITY
Start: 2021-02-04 | End: 2021-01-01

## 2021-01-01 RX ORDER — PROPRANOLOL HYDROCHLORIDE 10 MG/1
10 TABLET ORAL TWICE DAILY
Qty: 60 | Refills: 0 | Status: ACTIVE | COMMUNITY
Start: 2021-01-01 | End: 1900-01-01

## 2021-01-01 RX ORDER — LORAZEPAM 0.5 MG/1
0.5 TABLET ORAL
Qty: 150 | Refills: 0 | Status: DISCONTINUED | COMMUNITY
Start: 2021-02-04 | End: 2021-01-01

## 2021-01-01 RX ORDER — FAMOTIDINE 10 MG/ML
1 INJECTION INTRAVENOUS
Qty: 28 | Refills: 0
Start: 2021-01-01 | End: 2021-01-01

## 2021-01-01 RX ORDER — DIAZEPAM 2 MG/1
2 TABLET ORAL AT BEDTIME
Qty: 30 | Refills: 0 | Status: DISCONTINUED | COMMUNITY
Start: 2021-01-01 | End: 2021-01-01

## 2021-01-01 RX ADMIN — Medication 1 MILLIGRAM(S): at 16:59

## 2021-01-01 NOTE — H&P ADULT - NSHPPHYSICALEXAM_GEN_ALL_CORE
Vital Signs Last 24 Hrs  T(C): 37.2 (26 Jun 2019 09:21), Max: 37.2 (26 Jun 2019 09:21)  T(F): 98.9 (26 Jun 2019 09:21), Max: 98.9 (26 Jun 2019 09:21)  HR: 78 (26 Jun 2019 09:21) (71 - 88)  BP: 102/62 (26 Jun 2019 09:21) (102/62 - 159/80)  BP(mean): --  RR: 17 (26 Jun 2019 09:21) (16 - 20)  SpO2: 96% (26 Jun 2019 09:21) (95% - 97%)  I&O's Detail      General: NAD, resting comfortably  CV: Normal S1 and S2 appreciated with no murmurs, rubs, or gallops  Pulmonary: Lungs clear to auscultation bilaterally with no wheezes, rales, or rhonchi   Abdomen: Abdomen soft with no scars or bruises. Mild tenderness to deep palpation in the LLQ and suprapubic region. Tympanitic to percussion, no abdominal or renal bruits. No masses or organomegaly. Negative Christy sign.   Extremities: Extremities warm. Strong and symmetric radial, DP, and PT pulses. No LE edema No action was needed

## 2021-01-03 ENCOUNTER — FORM ENCOUNTER (OUTPATIENT)
Age: 81
End: 2021-01-03

## 2021-01-04 ENCOUNTER — APPOINTMENT (OUTPATIENT)
Dept: PSYCHIATRY | Facility: CLINIC | Age: 81
End: 2021-01-04

## 2021-01-04 PROCEDURE — 90832 PSYTX W PT 30 MINUTES: CPT | Mod: 95

## 2021-01-04 PROCEDURE — 99214 OFFICE O/P EST MOD 30 MIN: CPT | Mod: 95

## 2021-01-10 ENCOUNTER — FORM ENCOUNTER (OUTPATIENT)
Age: 81
End: 2021-01-10

## 2021-01-11 ENCOUNTER — APPOINTMENT (OUTPATIENT)
Dept: PSYCHIATRY | Facility: CLINIC | Age: 81
End: 2021-01-11

## 2021-01-11 PROCEDURE — 90832 PSYTX W PT 30 MINUTES: CPT | Mod: 95

## 2021-01-14 ENCOUNTER — FORM ENCOUNTER (OUTPATIENT)
Age: 81
End: 2021-01-14

## 2021-01-25 ENCOUNTER — APPOINTMENT (OUTPATIENT)
Dept: PSYCHIATRY | Facility: CLINIC | Age: 81
End: 2021-01-25

## 2021-01-26 ENCOUNTER — NON-APPOINTMENT (OUTPATIENT)
Age: 81
End: 2021-01-26

## 2021-01-26 DIAGNOSIS — I10 ESSENTIAL (PRIMARY) HYPERTENSION: ICD-10-CM

## 2021-01-26 DIAGNOSIS — E78.00 PURE HYPERCHOLESTEROLEMIA, UNSPECIFIED: ICD-10-CM

## 2021-01-26 RX ORDER — CHLORHEXIDINE GLUCONATE 4 %
LIQUID (ML) TOPICAL
Refills: 0 | Status: ACTIVE | COMMUNITY

## 2021-01-26 RX ORDER — METFORMIN ER 750 MG 750 MG/1
750 TABLET ORAL
Refills: 0 | Status: ACTIVE | COMMUNITY

## 2021-01-26 RX ORDER — HYDROCHLOROTHIAZIDE 25 MG/1
25 TABLET ORAL
Refills: 0 | Status: ACTIVE | COMMUNITY

## 2021-01-26 RX ORDER — ASPIRIN 81 MG
81 TABLET, DELAYED RELEASE (ENTERIC COATED) ORAL
Refills: 0 | Status: ACTIVE | COMMUNITY

## 2021-02-01 ENCOUNTER — APPOINTMENT (OUTPATIENT)
Dept: PSYCHIATRY | Facility: CLINIC | Age: 81
End: 2021-02-01
Payer: MEDICARE

## 2021-02-01 PROCEDURE — 90832 PSYTX W PT 30 MINUTES: CPT | Mod: 95

## 2021-02-04 ENCOUNTER — APPOINTMENT (OUTPATIENT)
Dept: PSYCHIATRY | Facility: CLINIC | Age: 81
End: 2021-02-04
Payer: MEDICARE

## 2021-02-04 DIAGNOSIS — F40.01 AGORAPHOBIA WITH PANIC DISORDER: ICD-10-CM

## 2021-02-04 DIAGNOSIS — H91.93 UNSPECIFIED HEARING LOSS, BILATERAL: ICD-10-CM

## 2021-02-04 DIAGNOSIS — Z82.0 FAMILY HISTORY OF EPILEPSY AND OTHER DISEASES OF THE NERVOUS SYSTEM: ICD-10-CM

## 2021-02-04 DIAGNOSIS — H93.12 TINNITUS, LEFT EAR: ICD-10-CM

## 2021-02-04 PROCEDURE — 99215 OFFICE O/P EST HI 40 MIN: CPT | Mod: 95

## 2021-02-04 RX ORDER — LORAZEPAM 0.5 MG/1
0.5 TABLET ORAL 4 TIMES DAILY
Qty: 120 | Refills: 0 | Status: DISCONTINUED | COMMUNITY
Start: 2021-02-04 | End: 2021-02-04

## 2021-02-04 RX ORDER — LORAZEPAM 0.5 MG/1
0.5 TABLET ORAL 3 TIMES DAILY
Refills: 0 | Status: DISCONTINUED | COMMUNITY
End: 2021-02-04

## 2021-02-09 ENCOUNTER — APPOINTMENT (OUTPATIENT)
Dept: PSYCHIATRY | Facility: CLINIC | Age: 81
End: 2021-02-09
Payer: MEDICARE

## 2021-02-09 PROCEDURE — 90832 PSYTX W PT 30 MINUTES: CPT | Mod: 95

## 2021-02-10 NOTE — ED PROVIDER NOTE - NS ED ATTENDING NAME FT
Orientation to room/Assess for adequate lighting, leave nightlight on/Patient and family education available to parents and patient/Document fall prevention teaching and include in plan of care
Zahraa

## 2021-02-22 ENCOUNTER — APPOINTMENT (OUTPATIENT)
Dept: PSYCHIATRY | Facility: CLINIC | Age: 81
End: 2021-02-22
Payer: MEDICARE

## 2021-02-22 PROCEDURE — 90832 PSYTX W PT 30 MINUTES: CPT | Mod: 95

## 2021-03-01 ENCOUNTER — APPOINTMENT (OUTPATIENT)
Dept: PSYCHIATRY | Facility: CLINIC | Age: 81
End: 2021-03-01
Payer: MEDICARE

## 2021-03-01 DIAGNOSIS — Z86.59 PERSONAL HISTORY OF OTHER MENTAL AND BEHAVIORAL DISORDERS: ICD-10-CM

## 2021-03-01 PROCEDURE — 90832 PSYTX W PT 30 MINUTES: CPT | Mod: 95

## 2021-03-01 PROCEDURE — 99215 OFFICE O/P EST HI 40 MIN: CPT | Mod: 95

## 2021-03-08 ENCOUNTER — APPOINTMENT (OUTPATIENT)
Dept: PSYCHIATRY | Facility: CLINIC | Age: 81
End: 2021-03-08
Payer: MEDICARE

## 2021-03-08 PROCEDURE — 90832 PSYTX W PT 30 MINUTES: CPT | Mod: 95

## 2021-03-15 ENCOUNTER — APPOINTMENT (OUTPATIENT)
Dept: PSYCHIATRY | Facility: CLINIC | Age: 81
End: 2021-03-15
Payer: MEDICARE

## 2021-03-15 PROCEDURE — 90832 PSYTX W PT 30 MINUTES: CPT | Mod: 95

## 2021-03-22 ENCOUNTER — APPOINTMENT (OUTPATIENT)
Dept: PSYCHIATRY | Facility: CLINIC | Age: 81
End: 2021-03-22
Payer: MEDICARE

## 2021-03-22 PROCEDURE — 90832 PSYTX W PT 30 MINUTES: CPT | Mod: 95

## 2021-03-29 ENCOUNTER — APPOINTMENT (OUTPATIENT)
Dept: PSYCHIATRY | Facility: CLINIC | Age: 81
End: 2021-03-29
Payer: MEDICARE

## 2021-03-29 PROCEDURE — 99215 OFFICE O/P EST HI 40 MIN: CPT | Mod: 95

## 2021-04-05 ENCOUNTER — APPOINTMENT (OUTPATIENT)
Dept: PSYCHIATRY | Facility: CLINIC | Age: 81
End: 2021-04-05

## 2021-04-13 ENCOUNTER — APPOINTMENT (OUTPATIENT)
Dept: PSYCHIATRY | Facility: CLINIC | Age: 81
End: 2021-04-13

## 2021-04-13 NOTE — ED PROVIDER NOTE - NEUROLOGICAL GAIT WEIGHT BEARING
Name: Ayesha Vela    : 1960     Service Dept: [facility]    Patient's Pharmacies:    [facility] [address] [address]  Phone: [phone] Fax: [phone]       Chief Complaint   Patient presents with    Ankle Pain        There were no vitals taken for this visit. No Known Allergies   Current Outpatient Medications   Medication Sig Dispense Refill    loratadine 10 mg cap Take 10 mg by mouth.  losartan (COZAAR) 50 mg tablet Take 50 mg by mouth daily.  hydroCHLOROthiazide (MICROZIDE) 12.5 mg capsule Take 12.5 mg by mouth daily.  Omeprazole delayed release (PRILOSEC D/R) 20 mg tablet Take 20 mg by mouth daily.  multivitamin (ONE A DAY) tablet Take 1 Tab by mouth daily.  calcium-cholecalciferol, d3, (CALCIUM 600 + D) 600-125 mg-unit tab Take 600 mg by mouth.  ascorbic acid, vitamin C, (Vitamin C) 500 mg tablet Take 500 mg by mouth daily.  atorvastatin (LIPITOR) 20 mg tablet Take 20 mg by mouth daily.  buPROPion XL (WELLBUTRIN XL) 300 mg XL tablet Take 300 mg by mouth every morning.  escitalopram oxalate (LEXAPRO) 20 mg tablet Take 20 mg by mouth daily. Patient Active Problem List   Diagnosis Code    Ankle fracture, bimalleolar, closed, right, initial encounter S82.841A    Left tibial fracture S82.202A    Talus fracture S92.109A    Diabetes mellitus type 2, controlled ([facility] [address] E11.9    Essential hypertension I10    Hyperlipidemia, mixed E78.2    Chronic GERD K21.9    Depression, major F32.9    Acute right ankle pain M25.571    S/P surgical manipulation of ankle joint Z98.890      No family history on file.    Social History     Socioeconomic History    Marital status:      Spouse name: Not on file    Number of children: Not on file    Years of education: Not on file    Highest education level: Not on file   Tobacco Use    Smoking status: Never Smoker    Smokeless tobacco: Never Used   Substance and Sexual Activity    Alcohol use: Not Currently    Drug use: Not Currently      Past Surgical History:   Procedure Laterality Date    HX CERVICAL FUSION        Past Medical History:   Diagnosis Date    Diabetes ([address]     GERD (gastroesophageal reflux disease)     Hyperlipemia     Hypertension     Psychiatric disorder         I have reviewed and agree with PFSH and ROS and intake form in chart and the record furthermore I have reviewed prior medical record(s) regarding this patients care during this appointment. Review of Systems:   Patient is a pleasant appearing individual, appropriately dressed, well hydrated, well nourished, who is alert, appropriately oriented for age, and in no acute distress with a wheelchair bound gait and normal affect who does not appear to be in any significant pain. Physical Exam:  Right Ankle-Point tenderness to palpation lateral aspect on ankle, Decreased range of motion with flexion and extension, No gross instability, Weakness with plantar flexion, No skin abrasions, Positive for swelling, Grossly neurovascularly intact. Left Ankle-Point tenderness to palpation lateral aspect on ankle, Decreased range of motion with flexion and extension, No gross instability, Weakness with plantar flexion, No skin abrasions, Positive for swelling, Grossly neurovascularly intact. Please note that a DME was provided from our office and fitted to an appropriate size. DME provided will help decrease soft tissue swelling, assist with stabilization, and decrease pain with immobilization. Encounter Diagnoses     ICD-10-CM ICD-9-CM   1. Right ankle pain, unspecified chronicity  M25.571 719.47   2.  Left ankle pain, unspecified chronicity  M25.572 719.47       HPI:  The patient is here with a chief complaint of right ankle pain, status post right ankle ORIF for bimalleolar fracture. For the left, distal fibular fracture in good alignment. For the left, x-rays show well-aligned distal fibular fracture. Assessment/Plan:  Plan at this point for the left will be lace-up ankle shaft; for the right, Cam boot. We will see her back in 4 weeks to repeat x-rays of bilateral ankles. At that point, she should have no restriction on the left and the right. Next time, we will transition to a lace-up ankle shaft and go from there. As part of continued conservative pain management options the patient was advised to utilize Tylenol or OTC NSAIDS as long as it is not medically contraindicated. Return to Office: Follow-up and Dispositions    · Return in about 4 weeks (around 5/11/2021). Scribed by Rafy Longoria LPN as dictated by [facility] [place] NUZHAT Beauchamp MD.  Documentation True and Accepted Chad Beauchamp MD normal

## 2021-04-20 ENCOUNTER — APPOINTMENT (OUTPATIENT)
Dept: PSYCHIATRY | Facility: CLINIC | Age: 81
End: 2021-04-20
Payer: MEDICARE

## 2021-04-20 PROCEDURE — 90832 PSYTX W PT 30 MINUTES: CPT | Mod: 95

## 2021-04-26 ENCOUNTER — APPOINTMENT (OUTPATIENT)
Dept: PSYCHIATRY | Facility: CLINIC | Age: 81
End: 2021-04-26
Payer: MEDICARE

## 2021-04-26 PROCEDURE — 90832 PSYTX W PT 30 MINUTES: CPT | Mod: 95

## 2021-04-28 ENCOUNTER — APPOINTMENT (OUTPATIENT)
Dept: PSYCHIATRY | Facility: CLINIC | Age: 81
End: 2021-04-28
Payer: MEDICARE

## 2021-04-28 DIAGNOSIS — Z79.4 TYPE 2 DIABETES MELLITUS WITH OTHER DIABETIC NEUROLOGICAL COMPLICATION: ICD-10-CM

## 2021-04-28 DIAGNOSIS — E11.49 TYPE 2 DIABETES MELLITUS WITH OTHER DIABETIC NEUROLOGICAL COMPLICATION: ICD-10-CM

## 2021-04-28 DIAGNOSIS — E11.9 TYPE 2 DIABETES MELLITUS W/OUT COMPLICATIONS: ICD-10-CM

## 2021-04-28 PROCEDURE — 99215 OFFICE O/P EST HI 40 MIN: CPT | Mod: 95

## 2021-04-28 RX ORDER — CHOLECALCIFEROL (VITAMIN D3) 25 MCG
25 MCG TABLET ORAL DAILY
Refills: 0 | Status: ACTIVE | COMMUNITY

## 2021-04-28 RX ORDER — B-COMPLEX WITH VITAMIN C
TABLET ORAL
Refills: 0 | Status: DISCONTINUED | COMMUNITY
End: 2021-04-28

## 2021-04-28 RX ORDER — ATORVASTATIN CALCIUM 20 MG/1
20 TABLET, FILM COATED ORAL
Refills: 0 | Status: DISCONTINUED | COMMUNITY
End: 2021-04-28

## 2021-04-28 RX ORDER — ROSUVASTATIN CALCIUM 20 MG/1
20 TABLET, FILM COATED ORAL DAILY
Refills: 0 | Status: ACTIVE | COMMUNITY
Start: 2021-04-28

## 2021-04-28 RX ORDER — QUINAPRIL HYDROCHLORIDE 40 MG/1
40 TABLET, FILM COATED ORAL DAILY
Refills: 0 | Status: ACTIVE | COMMUNITY

## 2021-04-28 RX ORDER — CYANOCOBALAMIN (VITAMIN B-12) 1000 MCG
1000 TABLET ORAL DAILY
Refills: 0 | Status: ACTIVE | COMMUNITY

## 2021-04-28 RX ORDER — GAUZE BANDAGE 4" X 4"
1000 BANDAGE TOPICAL DAILY
Refills: 0 | Status: ACTIVE | COMMUNITY

## 2021-05-03 ENCOUNTER — APPOINTMENT (OUTPATIENT)
Dept: PSYCHIATRY | Facility: CLINIC | Age: 81
End: 2021-05-03

## 2021-05-10 ENCOUNTER — APPOINTMENT (OUTPATIENT)
Dept: PSYCHIATRY | Facility: CLINIC | Age: 81
End: 2021-05-10
Payer: MEDICARE

## 2021-05-10 PROCEDURE — 90832 PSYTX W PT 30 MINUTES: CPT | Mod: 95

## 2021-05-17 ENCOUNTER — APPOINTMENT (OUTPATIENT)
Dept: PSYCHIATRY | Facility: CLINIC | Age: 81
End: 2021-05-17
Payer: MEDICARE

## 2021-05-17 PROCEDURE — 90832 PSYTX W PT 30 MINUTES: CPT | Mod: 95

## 2021-05-17 NOTE — ED ADULT NURSE NOTE - NS ED NURSE DISCH DISPOSITION
[place] to call system/Call bell, personal items and telephone within reach/Instruct patient to call for assistance/Non-slip footwear when patient is off stretcher/Physically safe environment: no spills, clutter or unnecessary equipment/Stretcher in lowest position, wheels locked, appropriate side rails in place/Monitor gait and stability/Monitor for mental status changes and reorient to person, place, and time/Reinforce activity limits and safety measures with patient and family Transferred

## 2021-05-24 ENCOUNTER — APPOINTMENT (OUTPATIENT)
Dept: PSYCHIATRY | Facility: CLINIC | Age: 81
End: 2021-05-24
Payer: MEDICARE

## 2021-05-24 PROCEDURE — 90832 PSYTX W PT 30 MINUTES: CPT | Mod: 95

## 2021-05-26 ENCOUNTER — APPOINTMENT (OUTPATIENT)
Dept: PSYCHIATRY | Facility: CLINIC | Age: 81
End: 2021-05-26

## 2021-05-27 ENCOUNTER — APPOINTMENT (OUTPATIENT)
Dept: PSYCHIATRY | Facility: CLINIC | Age: 81
End: 2021-05-27
Payer: MEDICARE

## 2021-05-27 PROCEDURE — 99213 OFFICE O/P EST LOW 20 MIN: CPT | Mod: 95

## 2021-06-07 ENCOUNTER — APPOINTMENT (OUTPATIENT)
Dept: PSYCHIATRY | Facility: CLINIC | Age: 81
End: 2021-06-07
Payer: MEDICARE

## 2021-06-07 PROCEDURE — 90832 PSYTX W PT 30 MINUTES: CPT | Mod: 95

## 2021-06-14 ENCOUNTER — APPOINTMENT (OUTPATIENT)
Dept: PSYCHIATRY | Facility: CLINIC | Age: 81
End: 2021-06-14
Payer: MEDICARE

## 2021-06-14 PROCEDURE — 90832 PSYTX W PT 30 MINUTES: CPT | Mod: 95

## 2021-06-18 ENCOUNTER — APPOINTMENT (OUTPATIENT)
Dept: PSYCHIATRY | Facility: CLINIC | Age: 81
End: 2021-06-18
Payer: MEDICARE

## 2021-06-18 PROCEDURE — 99214 OFFICE O/P EST MOD 30 MIN: CPT | Mod: 95

## 2021-07-14 ENCOUNTER — APPOINTMENT (OUTPATIENT)
Dept: PSYCHIATRY | Facility: CLINIC | Age: 81
End: 2021-07-14
Payer: MEDICARE

## 2021-07-14 PROCEDURE — 99215 OFFICE O/P EST HI 40 MIN: CPT | Mod: 95

## 2021-08-04 ENCOUNTER — APPOINTMENT (OUTPATIENT)
Dept: PSYCHIATRY | Facility: CLINIC | Age: 81
End: 2021-08-04
Payer: MEDICARE

## 2021-08-04 PROCEDURE — 99213 OFFICE O/P EST LOW 20 MIN: CPT | Mod: 95

## 2021-08-11 ENCOUNTER — APPOINTMENT (OUTPATIENT)
Dept: PSYCHIATRY | Facility: CLINIC | Age: 81
End: 2021-08-11
Payer: MEDICARE

## 2021-08-11 PROCEDURE — 99215 OFFICE O/P EST HI 40 MIN: CPT | Mod: 95

## 2021-08-11 RX ORDER — CLONAZEPAM 0.5 MG/1
0.5 TABLET ORAL TWICE DAILY
Qty: 60 | Refills: 0 | Status: DISCONTINUED | COMMUNITY
Start: 2021-07-14 | End: 2021-08-11

## 2021-08-16 NOTE — ED ADULT NURSE NOTE - OBJECTIVE STATEMENT
Pt presents to ED c/o generalized weakness s/p taking Clonzaepam 0.25mg last night for the first time. Pt normally takes Atvian daily for anxiety but has noted increased anxiety lately and PMD added Clonzaepam. Pt reports today he woke up feeling dizzy with unsteady gait. Pt denies any numbness specific to one side, no confusion or disorientation. Pt is A&Ox4, neuro/sensory intact.

## 2021-08-16 NOTE — ED PROVIDER NOTE - OBJECTIVE STATEMENT
82 yo m pmhx sig for parkinson's disease, dm pw palpitations that were sudden onset during breakfast with no n/v, no diaphoresis, the palpations now resolved. No cp, no sob, no n/v, no diaphoresis.    I have reviewed available current nursing and previous documentation of past medical, surgical, family, and/or social history.

## 2021-08-16 NOTE — ED PROVIDER NOTE - PATIENT PORTAL LINK FT
You can access the FollowMyHealth Patient Portal offered by [facility] by registering at the following website: [email or web address]. By joining [employer]’s FollowMyHealth portal, you will also be able to view your health information using other applications (apps) compatible with our system.

## 2021-08-16 NOTE — ED ADULT TRIAGE NOTE - CHIEF COMPLAINT QUOTE
patrick c/o generalized not feeling well after starting new medication. patient currently newly prescribed clonzaepam 0.25mg newly for anxiety. hx of parkinson

## 2021-08-16 NOTE — ED PROVIDER NOTE - CARE PROVIDERS DIRECT ADDRESSES
[email or web address]

## 2021-08-16 NOTE — ED PROVIDER NOTE - PHYSICAL EXAMINATION
Physical Exam    Vital Signs: I have reviewed the initial vital signs.  Constitutional: well-nourished, appears stated age, no acute distress  Eyes: PERRLA, and symmetrical lids.  ENT: Neck supple with no adenopathy, moist MM.  Cardiovascular: regular rate, regular rhythm, well-perfused extremities  Respiratory: unlabored respiratory effort, clear to auscultation bilaterally  Gastrointestinal: soft, non-tender abdomen, no pulsatile mass  Musculoskeletal: supple neck, no lower extremity edema  Integumentary: warm, dry, no rash  Neurologic: extremities’ motor and sensory functions grossly intact, +benign resting tremor, otherwise no focal deficits  Psychiatric: A&Ox3

## 2021-08-16 NOTE — ED PROVIDER NOTE - CLINICAL SUMMARY MEDICAL DECISION MAKING FREE TEXT BOX
Pt is an 80 yo m pmhx sig for Parkinson's and dm pw palpitations now resolved and well appearing in the ED, nl ecg, and neg trop. No electrolyte abnormalities.

## 2021-08-16 NOTE — ED PROVIDER NOTE - CARE PROVIDER_API CALL
Mazin Deshpande)  Cardiovascular Disease; Internal Medicine  [address], [facility], [address]th [place] [address]  Phone: [phone]  Fax: [phone]  Follow Up Time: 1-3 Days    Barrie Telles)  Neurology; Neuromuscular Medicine  [address], [address] [place] [address]  Phone: [phone]  Fax: [phone]  Follow Up Time:

## 2021-11-11 NOTE — ED ADULT TRIAGE NOTE - WEIGHT IN KG
"[facility]  Interventional Radiology   Drainage Tube Instructions      AFTER YOU GO HOME:    Have an adult stay with you for 24 hours.     Drink plenty of fluids and resume your regular diet.    For 24 hours:       Do not drive or operate machinery at home or at work    No alcoholic beverages    Do not make any important legal decisions    No heavy lifting greater than 10 lb until instructed by your physician     Call Your Physician if:    You develop nausea or vomiting.    Your develop hives or rash or unexplained itching    Additional Instructions: Please call for the following problems:    No fluid draining from the tube, check that the tube is not kinked or if stop cock is closed.    Flush with 5 cc's betadine, empty bag and monitor output daily subtracting amount inserted.    Skin around tube is red, painful or has any drainage.    You have increased pain in your abdomen    Fever greater than 100.5 F and chills    You feel nauseated and  \"just not right\"      Change dressing every 48 hour or when it gets wet    Interventional Radiology Department    Physician:                  Dr. Hodge           Date:September 14, 2020  Telephone numbers: [phone]...Monday-Friday 8:00am-4:30pm                                     [phone]... After 4:30 Monday-Friday, Weekends and Holiday. Ask for the Interventional Radiologist on call. Someone is on call 24 hours a day.                                  "
86.2

## 2021-11-11 NOTE — ED ADULT TRIAGE NOTE - CHIEF COMPLAINT QUOTE
MICHAEL from home accompanied by HHA after having a burning sensation from floor to his whole body while he was sitting with bilateral leg weakness. +walker at bedside. h/o parkinsons, DM, and anxiety.  in the field

## 2021-11-11 NOTE — ED PROVIDER NOTE - PATIENT PORTAL LINK FT
You can access the FollowMyHealth Patient Portal offered by [facility] by registering at the following website: [email or web address]. By joining [employer]’s FollowMyHealth portal, you will also be able to view your health information using other applications (apps) compatible with our system.

## 2021-11-11 NOTE — ED ADULT NURSE NOTE - OBJECTIVE STATEMENT
Pt BIBA from home in company of HHA c/o "feeling like a light went through my body."  Pt has PMH of Parkinson's and DM, walks w/ use of walker at baseline.  HHA 7 hours/day.  Pt denies falls, LOC, CP, cough, fever/chills.  Pt is neurologically intact, A&Ox3.  Tremor at baseline.

## 2021-11-11 NOTE — ED PROVIDER NOTE - NSFOLLOWUPINSTRUCTIONS_ED_ALL_ED_FT
PARESTHESIA - [facility](R) Instructions(ER/ED)           Paresthesia    WHAT YOU NEED TO KNOW:    Paresthesia is numbness, tingling, or burning. It can happen in any part of your body, but usually occurs in your legs, feet, arms, or hands.    DISCHARGE INSTRUCTIONS:    Return to the emergency department if:   •You have severe pain along with numbness and tingling.      •Your legs suddenly become cold. You have trouble moving your legs, and they ache.      •You have increased weakness in a part of your body.      •You have uncontrolled movements.      Contact your healthcare provider or neurologist if:   •Your symptoms do not improve.      •You have symptoms in more than one part of your body.      •You have questions or concerns about your condition or care.      Manage paresthesia:   •Protect the area from injury. You may injure or burn yourself if you lose feeling in the area. Be careful when you touch anything that could be hot. Wear sturdy shoes to protect your feet. Ask about other ways to protect yourself.       •Go to physical or occupational therapy if directed. Your provider may recommend therapy if you have a condition such as carpal tunnel syndrome. A physical therapist can teach you exercises to help strengthen the area or increase your ability to move it. An occupational therapist can help you find new ways to do your daily activities.      •Manage health conditions that can cause paresthesia. Work with your diabetes specialist if you have uncontrolled diabetes. A dietitian or your healthcare provider can help you create a meal plan if you have low vitamin B levels. Your provider can help you manage your health if you have multiple sclerosis or you had a stroke. It is important to manage health conditions to stop paresthesia or prevent it from getting worse.      Follow up with your healthcare provider or neurologist as directed: Your healthcare provider may refer you to a specialist. Write down your questions so you remember to ask them during your visits.       © Copyright [employer] 2021           back to top                          © Copyright [employer] 2021

## 2021-11-11 NOTE — ED ADULT NURSE NOTE - NSIMPLEMENTINTERV_GEN_ALL_ED
Implemented All Fall Risk Interventions:  [place] to call system. Call bell, personal items and telephone within reach. Instruct patient to call for assistance. Room bathroom lighting operational. Non-slip footwear when patient is off stretcher. Physically safe environment: no spills, clutter or unnecessary equipment. Stretcher in lowest position, wheels locked, appropriate side rails in place. Provide visual cue, wrist band, yellow gown, etc. Monitor gait and stability. Monitor for mental status changes and reorient to person, place, and time. Review medications for side effects contributing to fall risk. Reinforce activity limits and safety measures with patient and family.

## 2021-11-11 NOTE — ED PROVIDER NOTE - CLINICAL SUMMARY MEDICAL DECISION MAKING FREE TEXT BOX
Worsening PD- w an episode of diffuse/tingling numbness in BL ue/le extremities- for a few minutes  check ekg, labs, CT head Worsening PD- w an episode of diffuse/tingling numbness in BL ue/le extremities- for a few minutes  check ekg, labs, CT head  px very anxious- will give dose of ativan- as px is due for nightly dose- ambulated well with walker- req assistance going home -- no further al indicated

## 2021-11-11 NOTE — ED PROVIDER NOTE - OBJECTIVE STATEMENT
81 m pmhx sig for parkinson's disease, DM, HTN - presents w co increased unsteadiness/weakness to both legs for several days- PD meds were increased 1.5 weeks ago- and HHA saw an improvement in mobility  this am- while watching tv- had an episode of tingling/numbness in his legs- started in feet/both legs and spread up his legs to his chest and the rest of his body - sx lasted a minute of two  no f/c no n/v  no cp no sob  mild-moderate severity

## 2021-12-16 NOTE — HISTORY OF PRESENT ILLNESS
[FreeTextEntry1] : cyst [de-identified] : new patient\par longstanding spot on back, now with increase in size, redness\par no pain, drainage, fever, chills\par from [place] but has been in [place] over 40 years

## 2021-12-16 NOTE — PHYSICAL EXAM
[Alert] : alert [Oriented x 3] : ~L oriented x 3 [Well Nourished] : well nourished [Conjunctiva Non-injected] : conjunctiva non-injected [No Visual Lymphadenopathy] : no visual  lymphadenopathy [No Clubbing] : no clubbing [No Edema] : no edema [No Bromhidrosis] : no bromhidrosis [No Chromhidrosis] : no chromhidrosis [FreeTextEntry3] : w/c bound\par upper back several cm subcutaneous nodule with surrounding erythema, punctum visible but no drainage

## 2022-01-01 ENCOUNTER — APPOINTMENT (OUTPATIENT)
Dept: PSYCHIATRY | Facility: CLINIC | Age: 82
End: 2022-01-01

## 2022-01-01 ENCOUNTER — EMERGENCY (EMERGENCY)
Facility: HOSPITAL | Age: 82
LOS: 1 days | Discharge: HOME | End: 2022-01-01
Admitting: EMERGENCY MEDICINE
Payer: MEDICARE

## 2022-01-01 ENCOUNTER — APPOINTMENT (OUTPATIENT)
Dept: PSYCHIATRY | Facility: CLINIC | Age: 82
End: 2022-01-01
Payer: MEDICARE

## 2022-01-01 ENCOUNTER — APPOINTMENT (OUTPATIENT)
Dept: DERMATOLOGY | Facility: CLINIC | Age: 82
End: 2022-01-01
Payer: MEDICARE

## 2022-01-01 ENCOUNTER — LABORATORY RESULT (OUTPATIENT)
Age: 82
End: 2022-01-01

## 2022-01-01 ENCOUNTER — APPOINTMENT (OUTPATIENT)
Dept: DERMATOLOGY | Facility: CLINIC | Age: 82
End: 2022-01-01

## 2022-01-01 ENCOUNTER — NON-APPOINTMENT (OUTPATIENT)
Age: 82
End: 2022-01-01

## 2022-01-01 ENCOUNTER — OUTPATIENT (OUTPATIENT)
Dept: OUTPATIENT SERVICES | Facility: HOSPITAL | Age: 82
LOS: 1 days | Discharge: HOME | End: 2022-01-01

## 2022-01-01 ENCOUNTER — INPATIENT (INPATIENT)
Facility: HOSPITAL | Age: 82
LOS: 7 days | DRG: 871 | End: 2022-08-13
Attending: GENERAL ACUTE CARE HOSPITAL | Admitting: GENERAL ACUTE CARE HOSPITAL
Payer: MEDICARE

## 2022-01-01 VITALS
RESPIRATION RATE: 18 BRPM | HEIGHT: 68 IN | TEMPERATURE: 98 F | HEART RATE: 109 BPM | WEIGHT: 160.06 LBS | SYSTOLIC BLOOD PRESSURE: 153 MMHG | DIASTOLIC BLOOD PRESSURE: 79 MMHG | OXYGEN SATURATION: 96 %

## 2022-01-01 VITALS
DIASTOLIC BLOOD PRESSURE: 78 MMHG | HEIGHT: 68 IN | RESPIRATION RATE: 18 BRPM | SYSTOLIC BLOOD PRESSURE: 139 MMHG | OXYGEN SATURATION: 96 % | HEART RATE: 94 BPM | TEMPERATURE: 98 F

## 2022-01-01 VITALS — BODY MASS INDEX: 25.77 KG/M2 | WEIGHT: 180 LBS | HEIGHT: 70 IN

## 2022-01-01 VITALS
RESPIRATION RATE: 28 BRPM | HEART RATE: 128 BPM | SYSTOLIC BLOOD PRESSURE: 118 MMHG | OXYGEN SATURATION: 77 % | TEMPERATURE: 103 F | DIASTOLIC BLOOD PRESSURE: 64 MMHG

## 2022-01-01 VITALS — HEIGHT: 70 IN

## 2022-01-01 VITALS
SYSTOLIC BLOOD PRESSURE: 176 MMHG | HEART RATE: 90 BPM | DIASTOLIC BLOOD PRESSURE: 80 MMHG | OXYGEN SATURATION: 96 % | RESPIRATION RATE: 19 BRPM

## 2022-01-01 DIAGNOSIS — Z51.5 ENCOUNTER FOR PALLIATIVE CARE: ICD-10-CM

## 2022-01-01 DIAGNOSIS — F41.0 PANIC DISORDER [EPISODIC PAROXYSMAL ANXIETY]: ICD-10-CM

## 2022-01-01 DIAGNOSIS — E78.5 HYPERLIPIDEMIA, UNSPECIFIED: ICD-10-CM

## 2022-01-01 DIAGNOSIS — R25.1 TREMOR, UNSPECIFIED: ICD-10-CM

## 2022-01-01 DIAGNOSIS — G20 PARKINSON'S DISEASE: ICD-10-CM

## 2022-01-01 DIAGNOSIS — G93.41 METABOLIC ENCEPHALOPATHY: ICD-10-CM

## 2022-01-01 DIAGNOSIS — Z79.84 LONG TERM (CURRENT) USE OF ORAL HYPOGLYCEMIC DRUGS: ICD-10-CM

## 2022-01-01 DIAGNOSIS — G47.00 INSOMNIA, UNSPECIFIED: ICD-10-CM

## 2022-01-01 DIAGNOSIS — J69.0 PNEUMONITIS DUE TO INHALATION OF FOOD AND VOMIT: ICD-10-CM

## 2022-01-01 DIAGNOSIS — F41.9 ANXIETY DISORDER, UNSPECIFIED: ICD-10-CM

## 2022-01-01 DIAGNOSIS — Z29.9 ENCOUNTER FOR PROPHYLACTIC MEASURES, UNSPECIFIED: ICD-10-CM

## 2022-01-01 DIAGNOSIS — L08.9 LOCAL INFECTION OF THE SKIN AND SUBCUTANEOUS TISSUE, UNSPECIFIED: ICD-10-CM

## 2022-01-01 DIAGNOSIS — J96.90 RESPIRATORY FAILURE, UNSPECIFIED, UNSPECIFIED WHETHER WITH HYPOXIA OR HYPERCAPNIA: ICD-10-CM

## 2022-01-01 DIAGNOSIS — I10 ESSENTIAL (PRIMARY) HYPERTENSION: ICD-10-CM

## 2022-01-01 DIAGNOSIS — L72.3 SEBACEOUS CYST: ICD-10-CM

## 2022-01-01 DIAGNOSIS — G25.79 OTHER DRUG INDUCED MOVEMENT DISORDERS: ICD-10-CM

## 2022-01-01 DIAGNOSIS — R10.823 RIGHT LOWER QUADRANT REBOUND ABDOMINAL TENDERNESS: ICD-10-CM

## 2022-01-01 DIAGNOSIS — E11.9 TYPE 2 DIABETES MELLITUS WITHOUT COMPLICATIONS: ICD-10-CM

## 2022-01-01 DIAGNOSIS — N17.9 ACUTE KIDNEY FAILURE, UNSPECIFIED: ICD-10-CM

## 2022-01-01 DIAGNOSIS — F32.9 MAJOR DEPRESSIVE DISORDER, SINGLE EPISODE, UNSPECIFIED: ICD-10-CM

## 2022-01-01 DIAGNOSIS — K11.7 DISTURBANCES OF SALIVARY SECRETION: ICD-10-CM

## 2022-01-01 DIAGNOSIS — A41.9 SEPSIS, UNSPECIFIED ORGANISM: ICD-10-CM

## 2022-01-01 DIAGNOSIS — Z71.89 OTHER SPECIFIED COUNSELING: ICD-10-CM

## 2022-01-01 DIAGNOSIS — K83.09 OTHER CHOLANGITIS: ICD-10-CM

## 2022-01-01 DIAGNOSIS — R53.2 FUNCTIONAL QUADRIPLEGIA: ICD-10-CM

## 2022-01-01 DIAGNOSIS — L30.4 ERYTHEMA INTERTRIGO: ICD-10-CM

## 2022-01-01 DIAGNOSIS — L98.9 DISORDER OF THE SKIN AND SUBCUTANEOUS TISSUE, UNSPECIFIED: ICD-10-CM

## 2022-01-01 DIAGNOSIS — R09.89 OTHER SPECIFIED SYMPTOMS AND SIGNS INVOLVING THE CIRCULATORY AND RESPIRATORY SYSTEMS: ICD-10-CM

## 2022-01-01 LAB
A1C WITH ESTIMATED AVERAGE GLUCOSE RESULT: 6.9 % — HIGH (ref 4–5.6)
ALBUMIN SERPL ELPH-MCNC: 2.4 G/DL — LOW (ref 3.3–5)
ALBUMIN SERPL ELPH-MCNC: 2.5 G/DL — LOW (ref 3.3–5)
ALBUMIN SERPL ELPH-MCNC: 2.6 G/DL — LOW (ref 3.3–5)
ALBUMIN SERPL ELPH-MCNC: 2.6 G/DL — LOW (ref 3.3–5)
ALBUMIN SERPL ELPH-MCNC: 2.8 G/DL — LOW (ref 3.3–5)
ALBUMIN SERPL ELPH-MCNC: 2.9 G/DL — LOW (ref 3.3–5)
ALBUMIN SERPL ELPH-MCNC: 3.1 G/DL — LOW (ref 3.3–5)
ALBUMIN SERPL ELPH-MCNC: 3.2 G/DL — LOW (ref 3.3–5)
ALBUMIN SERPL ELPH-MCNC: 3.4 G/DL — SIGNIFICANT CHANGE UP (ref 3.4–5)
ALBUMIN SERPL ELPH-MCNC: 3.8 G/DL — SIGNIFICANT CHANGE UP (ref 3.4–5)
ALP SERPL-CCNC: 102 U/L — SIGNIFICANT CHANGE UP (ref 40–120)
ALP SERPL-CCNC: 193 U/L — HIGH (ref 40–120)
ALP SERPL-CCNC: 59 U/L — SIGNIFICANT CHANGE UP (ref 40–120)
ALP SERPL-CCNC: 63 U/L — SIGNIFICANT CHANGE UP (ref 40–120)
ALP SERPL-CCNC: 64 U/L — SIGNIFICANT CHANGE UP (ref 40–120)
ALP SERPL-CCNC: 67 U/L — SIGNIFICANT CHANGE UP (ref 40–120)
ALP SERPL-CCNC: 814 U/L — HIGH (ref 40–120)
ALP SERPL-CCNC: 83 U/L — SIGNIFICANT CHANGE UP (ref 40–120)
ALP SERPL-CCNC: 83 U/L — SIGNIFICANT CHANGE UP (ref 40–120)
ALP SERPL-CCNC: 848 U/L — HIGH (ref 40–120)
ALT FLD-CCNC: 107 U/L — HIGH (ref 10–45)
ALT FLD-CCNC: 11 U/L — SIGNIFICANT CHANGE UP (ref 10–45)
ALT FLD-CCNC: 140 U/L — HIGH (ref 10–45)
ALT FLD-CCNC: 17 U/L — SIGNIFICANT CHANGE UP (ref 10–45)
ALT FLD-CCNC: 18 U/L — SIGNIFICANT CHANGE UP (ref 10–45)
ALT FLD-CCNC: 21 U/L — SIGNIFICANT CHANGE UP (ref 10–45)
ALT FLD-CCNC: 26 U/L — SIGNIFICANT CHANGE UP (ref 12–42)
ALT FLD-CCNC: 5 U/L — LOW (ref 10–45)
ALT FLD-CCNC: 6 U/L — LOW (ref 12–42)
ALT FLD-CCNC: <5 U/L — LOW (ref 10–45)
ANION GAP SERPL CALC-SCNC: 10 MMOL/L — SIGNIFICANT CHANGE UP (ref 5–17)
ANION GAP SERPL CALC-SCNC: 10 MMOL/L — SIGNIFICANT CHANGE UP (ref 9–16)
ANION GAP SERPL CALC-SCNC: 11 MMOL/L — SIGNIFICANT CHANGE UP (ref 5–17)
ANION GAP SERPL CALC-SCNC: 11 MMOL/L — SIGNIFICANT CHANGE UP (ref 5–17)
ANION GAP SERPL CALC-SCNC: 11 MMOL/L — SIGNIFICANT CHANGE UP (ref 9–16)
ANION GAP SERPL CALC-SCNC: 12 MMOL/L — SIGNIFICANT CHANGE UP (ref 5–17)
ANION GAP SERPL CALC-SCNC: 13 MMOL/L — SIGNIFICANT CHANGE UP (ref 5–17)
ANION GAP SERPL CALC-SCNC: 17 MMOL/L — SIGNIFICANT CHANGE UP (ref 5–17)
ANION GAP SERPL CALC-SCNC: 9 MMOL/L — SIGNIFICANT CHANGE UP (ref 5–17)
ANISOCYTOSIS BLD QL: SIGNIFICANT CHANGE UP
ANISOCYTOSIS BLD QL: SLIGHT — SIGNIFICANT CHANGE UP
APPEARANCE UR: CLEAR — SIGNIFICANT CHANGE UP
APTT BLD: 30.2 SEC — SIGNIFICANT CHANGE UP (ref 27.5–35.5)
APTT BLD: 32.8 SEC — SIGNIFICANT CHANGE UP (ref 27.5–35.5)
AST SERPL-CCNC: 1109 U/L — HIGH (ref 10–40)
AST SERPL-CCNC: 22 U/L — SIGNIFICANT CHANGE UP (ref 15–37)
AST SERPL-CCNC: 31 U/L — SIGNIFICANT CHANGE UP (ref 10–40)
AST SERPL-CCNC: 33 U/L — SIGNIFICANT CHANGE UP (ref 10–40)
AST SERPL-CCNC: 35 U/L — SIGNIFICANT CHANGE UP (ref 10–40)
AST SERPL-CCNC: 38 U/L — SIGNIFICANT CHANGE UP (ref 10–40)
AST SERPL-CCNC: 38 U/L — SIGNIFICANT CHANGE UP (ref 10–40)
AST SERPL-CCNC: 39 U/L — HIGH (ref 15–37)
AST SERPL-CCNC: 44 U/L — HIGH (ref 10–40)
AST SERPL-CCNC: 792 U/L — HIGH (ref 10–40)
BACTERIA # UR AUTO: PRESENT /HPF
BASE EXCESS BLDA CALC-SCNC: -0.2 MMOL/L — SIGNIFICANT CHANGE UP (ref -2–3)
BASE EXCESS BLDA CALC-SCNC: -0.3 MMOL/L — SIGNIFICANT CHANGE UP (ref -2–3)
BASE EXCESS BLDA CALC-SCNC: 0.9 MMOL/L — SIGNIFICANT CHANGE UP (ref -2–3)
BASOPHILS # BLD AUTO: 0 K/UL — SIGNIFICANT CHANGE UP (ref 0–0.2)
BASOPHILS # BLD AUTO: 0 K/UL — SIGNIFICANT CHANGE UP (ref 0–0.2)
BASOPHILS # BLD AUTO: 0.01 K/UL — SIGNIFICANT CHANGE UP (ref 0–0.2)
BASOPHILS # BLD AUTO: 0.01 K/UL — SIGNIFICANT CHANGE UP (ref 0–0.2)
BASOPHILS # BLD AUTO: 0.02 K/UL — SIGNIFICANT CHANGE UP (ref 0–0.2)
BASOPHILS # BLD AUTO: 0.02 K/UL — SIGNIFICANT CHANGE UP (ref 0–0.2)
BASOPHILS # BLD AUTO: 0.03 K/UL — SIGNIFICANT CHANGE UP (ref 0–0.2)
BASOPHILS # BLD AUTO: 0.04 K/UL — SIGNIFICANT CHANGE UP (ref 0–0.2)
BASOPHILS # BLD AUTO: 0.04 K/UL — SIGNIFICANT CHANGE UP (ref 0–0.2)
BASOPHILS NFR BLD AUTO: 0 % — SIGNIFICANT CHANGE UP (ref 0–2)
BASOPHILS NFR BLD AUTO: 0 % — SIGNIFICANT CHANGE UP (ref 0–2)
BASOPHILS NFR BLD AUTO: 0.1 % — SIGNIFICANT CHANGE UP (ref 0–2)
BASOPHILS NFR BLD AUTO: 0.1 % — SIGNIFICANT CHANGE UP (ref 0–2)
BASOPHILS NFR BLD AUTO: 0.2 % — SIGNIFICANT CHANGE UP (ref 0–2)
BASOPHILS NFR BLD AUTO: 0.3 % — SIGNIFICANT CHANGE UP (ref 0–2)
BILIRUB DIRECT SERPL-MCNC: 0.2 MG/DL — SIGNIFICANT CHANGE UP (ref 0–0.3)
BILIRUB DIRECT SERPL-MCNC: 1.5 MG/DL — HIGH (ref 0–0.3)
BILIRUB INDIRECT FLD-MCNC: 0.2 MG/DL — SIGNIFICANT CHANGE UP (ref 0.2–1)
BILIRUB INDIRECT FLD-MCNC: 0.3 MG/DL — SIGNIFICANT CHANGE UP (ref 0.2–1)
BILIRUB SERPL-MCNC: 0.3 MG/DL — SIGNIFICANT CHANGE UP (ref 0.2–1.2)
BILIRUB SERPL-MCNC: 0.4 MG/DL — SIGNIFICANT CHANGE UP (ref 0.2–1.2)
BILIRUB SERPL-MCNC: 0.5 MG/DL — SIGNIFICANT CHANGE UP (ref 0.2–1.2)
BILIRUB SERPL-MCNC: 0.7 MG/DL — SIGNIFICANT CHANGE UP (ref 0.2–1.2)
BILIRUB SERPL-MCNC: 1.8 MG/DL — HIGH (ref 0.2–1.2)
BILIRUB SERPL-MCNC: 1.8 MG/DL — HIGH (ref 0.2–1.2)
BILIRUB SERPL-MCNC: 2.4 MG/DL — HIGH (ref 0.2–1.2)
BILIRUB UR-MCNC: NEGATIVE — SIGNIFICANT CHANGE UP
BLD GP AB SCN SERPL QL: NEGATIVE — SIGNIFICANT CHANGE UP
BLD GP AB SCN SERPL QL: NEGATIVE — SIGNIFICANT CHANGE UP
BUN SERPL-MCNC: 16 MG/DL — SIGNIFICANT CHANGE UP (ref 7–23)
BUN SERPL-MCNC: 18 MG/DL — SIGNIFICANT CHANGE UP (ref 7–23)
BUN SERPL-MCNC: 19 MG/DL — SIGNIFICANT CHANGE UP (ref 7–23)
BUN SERPL-MCNC: 21 MG/DL — SIGNIFICANT CHANGE UP (ref 7–23)
BUN SERPL-MCNC: 23 MG/DL — SIGNIFICANT CHANGE UP (ref 7–23)
BUN SERPL-MCNC: 26 MG/DL — HIGH (ref 7–23)
BUN SERPL-MCNC: 27 MG/DL — HIGH (ref 7–23)
BUN SERPL-MCNC: 36 MG/DL — HIGH (ref 7–23)
BUN SERPL-MCNC: 38 MG/DL — HIGH (ref 7–23)
BUN SERPL-MCNC: 39 MG/DL — HIGH (ref 7–23)
BUN SERPL-MCNC: 42 MG/DL — HIGH (ref 7–23)
BURR CELLS BLD QL SMEAR: PRESENT — SIGNIFICANT CHANGE UP
BURR CELLS BLD QL SMEAR: PRESENT — SIGNIFICANT CHANGE UP
CALCIUM SERPL-MCNC: 8 MG/DL — LOW (ref 8.4–10.5)
CALCIUM SERPL-MCNC: 8.2 MG/DL — LOW (ref 8.4–10.5)
CALCIUM SERPL-MCNC: 8.3 MG/DL — LOW (ref 8.4–10.5)
CALCIUM SERPL-MCNC: 8.4 MG/DL — SIGNIFICANT CHANGE UP (ref 8.4–10.5)
CALCIUM SERPL-MCNC: 8.6 MG/DL — SIGNIFICANT CHANGE UP (ref 8.4–10.5)
CALCIUM SERPL-MCNC: 8.7 MG/DL — SIGNIFICANT CHANGE UP (ref 8.4–10.5)
CALCIUM SERPL-MCNC: 8.9 MG/DL — SIGNIFICANT CHANGE UP (ref 8.4–10.5)
CALCIUM SERPL-MCNC: 9 MG/DL — SIGNIFICANT CHANGE UP (ref 8.4–10.5)
CALCIUM SERPL-MCNC: 9.1 MG/DL — SIGNIFICANT CHANGE UP (ref 8.4–10.5)
CALCIUM SERPL-MCNC: 9.3 MG/DL — SIGNIFICANT CHANGE UP (ref 8.5–10.5)
CALCIUM SERPL-MCNC: 9.6 MG/DL — SIGNIFICANT CHANGE UP (ref 8.5–10.5)
CEA SERPL-MCNC: 3.4 NG/ML — SIGNIFICANT CHANGE UP (ref 0–3.8)
CHLORIDE SERPL-SCNC: 100 MMOL/L — SIGNIFICANT CHANGE UP (ref 96–108)
CHLORIDE SERPL-SCNC: 101 MMOL/L — SIGNIFICANT CHANGE UP (ref 96–108)
CHLORIDE SERPL-SCNC: 103 MMOL/L — SIGNIFICANT CHANGE UP (ref 96–108)
CHLORIDE SERPL-SCNC: 104 MMOL/L — SIGNIFICANT CHANGE UP (ref 96–108)
CHLORIDE SERPL-SCNC: 104 MMOL/L — SIGNIFICANT CHANGE UP (ref 96–108)
CHLORIDE SERPL-SCNC: 106 MMOL/L — SIGNIFICANT CHANGE UP (ref 96–108)
CHLORIDE SERPL-SCNC: 107 MMOL/L — SIGNIFICANT CHANGE UP (ref 96–108)
CHLORIDE SERPL-SCNC: 107 MMOL/L — SIGNIFICANT CHANGE UP (ref 96–108)
CHLORIDE SERPL-SCNC: 108 MMOL/L — SIGNIFICANT CHANGE UP (ref 96–108)
CHLORIDE SERPL-SCNC: 108 MMOL/L — SIGNIFICANT CHANGE UP (ref 96–108)
CHLORIDE SERPL-SCNC: 109 MMOL/L — HIGH (ref 96–108)
CHOLEST SERPL-MCNC: 68 MG/DL — SIGNIFICANT CHANGE UP
CK MB CFR SERPL CALC: 1.3 NG/ML — SIGNIFICANT CHANGE UP (ref 0–6.7)
CK SERPL-CCNC: 296 U/L — HIGH (ref 30–200)
CK SERPL-CCNC: 43 U/L — SIGNIFICANT CHANGE UP (ref 30–200)
CK SERPL-CCNC: 87 U/L — SIGNIFICANT CHANGE UP (ref 39–308)
CO2 BLDA-SCNC: 24 MMOL/L — SIGNIFICANT CHANGE UP (ref 19–24)
CO2 BLDA-SCNC: 24 MMOL/L — SIGNIFICANT CHANGE UP (ref 19–24)
CO2 BLDA-SCNC: 25 MMOL/L — HIGH (ref 19–24)
CO2 SERPL-SCNC: 23 MMOL/L — SIGNIFICANT CHANGE UP (ref 22–31)
CO2 SERPL-SCNC: 24 MMOL/L — SIGNIFICANT CHANGE UP (ref 22–31)
CO2 SERPL-SCNC: 25 MMOL/L — SIGNIFICANT CHANGE UP (ref 22–31)
CO2 SERPL-SCNC: 25 MMOL/L — SIGNIFICANT CHANGE UP (ref 22–31)
CO2 SERPL-SCNC: 26 MMOL/L — SIGNIFICANT CHANGE UP (ref 22–31)
CO2 SERPL-SCNC: 27 MMOL/L — SIGNIFICANT CHANGE UP (ref 22–31)
CO2 SERPL-SCNC: 27 MMOL/L — SIGNIFICANT CHANGE UP (ref 22–31)
CO2 SERPL-SCNC: 29 MMOL/L — SIGNIFICANT CHANGE UP (ref 22–31)
COLOR SPEC: YELLOW — SIGNIFICANT CHANGE UP
CORTICOSTEROID BINDING GLOBULIN RESULT: 1.9 MG/DL — SIGNIFICANT CHANGE UP
CORTIS F/TOTAL MFR SERPL: 10 % — SIGNIFICANT CHANGE UP
CORTIS SERPL-MCNC: 11 UG/DL — SIGNIFICANT CHANGE UP
CORTISOL, FREE RESULT: 1.1 UG/DL — SIGNIFICANT CHANGE UP
CREAT SERPL-MCNC: 0.51 MG/DL — SIGNIFICANT CHANGE UP (ref 0.5–1.3)
CREAT SERPL-MCNC: 0.55 MG/DL — SIGNIFICANT CHANGE UP (ref 0.5–1.3)
CREAT SERPL-MCNC: 0.57 MG/DL — SIGNIFICANT CHANGE UP (ref 0.5–1.3)
CREAT SERPL-MCNC: 0.66 MG/DL — SIGNIFICANT CHANGE UP (ref 0.5–1.3)
CREAT SERPL-MCNC: 0.69 MG/DL — SIGNIFICANT CHANGE UP (ref 0.5–1.3)
CREAT SERPL-MCNC: 0.72 MG/DL — SIGNIFICANT CHANGE UP (ref 0.5–1.3)
CREAT SERPL-MCNC: 0.78 MG/DL — SIGNIFICANT CHANGE UP (ref 0.5–1.3)
CREAT SERPL-MCNC: 1 MG/DL — SIGNIFICANT CHANGE UP (ref 0.5–1.3)
CREAT SERPL-MCNC: 1.02 MG/DL — SIGNIFICANT CHANGE UP (ref 0.5–1.3)
CREAT SERPL-MCNC: 1.05 MG/DL — SIGNIFICANT CHANGE UP (ref 0.5–1.3)
CREAT SERPL-MCNC: 1.75 MG/DL — HIGH (ref 0.5–1.3)
CRP SERPL-MCNC: 315 MG/L — HIGH (ref 0–4)
CULTURE RESULTS: SIGNIFICANT CHANGE UP
D DIMER BLD IA.RAPID-MCNC: 1783 NG/ML DDU — HIGH
D DIMER BLD IA.RAPID-MCNC: 8897 NG/ML DDU — HIGH
DIFF PNL FLD: ABNORMAL
EGFR: 101 ML/MIN/1.73M2 — SIGNIFICANT CHANGE UP
EGFR: 38 ML/MIN/1.73M2 — LOW
EGFR: 71 ML/MIN/1.73M2 — SIGNIFICANT CHANGE UP
EGFR: 73 ML/MIN/1.73M2 — SIGNIFICANT CHANGE UP
EGFR: 75 ML/MIN/1.73M2 — SIGNIFICANT CHANGE UP
EGFR: 89 ML/MIN/1.73M2 — SIGNIFICANT CHANGE UP
EGFR: 91 ML/MIN/1.73M2 — SIGNIFICANT CHANGE UP
EGFR: 93 ML/MIN/1.73M2 — SIGNIFICANT CHANGE UP
EGFR: 94 ML/MIN/1.73M2 — SIGNIFICANT CHANGE UP
EGFR: 98 ML/MIN/1.73M2 — SIGNIFICANT CHANGE UP
EGFR: 99 ML/MIN/1.73M2 — SIGNIFICANT CHANGE UP
EOSINOPHIL # BLD AUTO: 0 K/UL — SIGNIFICANT CHANGE UP (ref 0–0.5)
EOSINOPHIL # BLD AUTO: 0.01 K/UL — SIGNIFICANT CHANGE UP (ref 0–0.5)
EOSINOPHIL # BLD AUTO: 0.05 K/UL — SIGNIFICANT CHANGE UP (ref 0–0.5)
EOSINOPHIL # BLD AUTO: 0.05 K/UL — SIGNIFICANT CHANGE UP (ref 0–0.5)
EOSINOPHIL # BLD AUTO: 0.11 K/UL — SIGNIFICANT CHANGE UP (ref 0–0.5)
EOSINOPHIL # BLD AUTO: 0.12 K/UL — SIGNIFICANT CHANGE UP (ref 0–0.5)
EOSINOPHIL NFR BLD AUTO: 0 % — SIGNIFICANT CHANGE UP (ref 0–6)
EOSINOPHIL NFR BLD AUTO: 0.1 % — SIGNIFICANT CHANGE UP (ref 0–6)
EOSINOPHIL NFR BLD AUTO: 0.5 % — SIGNIFICANT CHANGE UP (ref 0–6)
EOSINOPHIL NFR BLD AUTO: 0.6 % — SIGNIFICANT CHANGE UP (ref 0–6)
EOSINOPHIL NFR BLD AUTO: 1.2 % — SIGNIFICANT CHANGE UP (ref 0–6)
EOSINOPHIL NFR BLD AUTO: 1.3 % — SIGNIFICANT CHANGE UP (ref 0–6)
EPI CELLS # UR: SIGNIFICANT CHANGE UP /HPF (ref 0–5)
ESTIMATED AVERAGE GLUCOSE: 151 MG/DL — HIGH (ref 68–114)
GLUCOSE BLDC GLUCOMTR-MCNC: 128 MG/DL — HIGH (ref 70–99)
GLUCOSE BLDC GLUCOMTR-MCNC: 138 MG/DL — HIGH (ref 70–99)
GLUCOSE BLDC GLUCOMTR-MCNC: 151 MG/DL — HIGH (ref 70–99)
GLUCOSE BLDC GLUCOMTR-MCNC: 160 MG/DL — HIGH (ref 70–99)
GLUCOSE BLDC GLUCOMTR-MCNC: 164 MG/DL — HIGH (ref 70–99)
GLUCOSE BLDC GLUCOMTR-MCNC: 165 MG/DL — HIGH (ref 70–99)
GLUCOSE BLDC GLUCOMTR-MCNC: 173 MG/DL — HIGH (ref 70–99)
GLUCOSE BLDC GLUCOMTR-MCNC: 173 MG/DL — HIGH (ref 70–99)
GLUCOSE BLDC GLUCOMTR-MCNC: 174 MG/DL — HIGH (ref 70–99)
GLUCOSE BLDC GLUCOMTR-MCNC: 175 MG/DL — HIGH (ref 70–99)
GLUCOSE BLDC GLUCOMTR-MCNC: 179 MG/DL — HIGH (ref 70–99)
GLUCOSE BLDC GLUCOMTR-MCNC: 181 MG/DL — HIGH (ref 70–99)
GLUCOSE BLDC GLUCOMTR-MCNC: 184 MG/DL — HIGH (ref 70–99)
GLUCOSE BLDC GLUCOMTR-MCNC: 187 MG/DL — HIGH (ref 70–99)
GLUCOSE BLDC GLUCOMTR-MCNC: 187 MG/DL — HIGH (ref 70–99)
GLUCOSE BLDC GLUCOMTR-MCNC: 189 MG/DL — HIGH (ref 70–99)
GLUCOSE BLDC GLUCOMTR-MCNC: 190 MG/DL — HIGH (ref 70–99)
GLUCOSE BLDC GLUCOMTR-MCNC: 190 MG/DL — HIGH (ref 70–99)
GLUCOSE BLDC GLUCOMTR-MCNC: 193 MG/DL — HIGH (ref 70–99)
GLUCOSE BLDC GLUCOMTR-MCNC: 203 MG/DL — HIGH (ref 70–99)
GLUCOSE BLDC GLUCOMTR-MCNC: 212 MG/DL — HIGH (ref 70–99)
GLUCOSE BLDC GLUCOMTR-MCNC: 218 MG/DL — HIGH (ref 70–99)
GLUCOSE BLDC GLUCOMTR-MCNC: 221 MG/DL — HIGH (ref 70–99)
GLUCOSE BLDC GLUCOMTR-MCNC: 222 MG/DL — HIGH (ref 70–99)
GLUCOSE BLDC GLUCOMTR-MCNC: 268 MG/DL — HIGH (ref 70–99)
GLUCOSE BLDC GLUCOMTR-MCNC: 312 MG/DL — HIGH (ref 70–99)
GLUCOSE SERPL-MCNC: 156 MG/DL — HIGH (ref 70–99)
GLUCOSE SERPL-MCNC: 157 MG/DL — HIGH (ref 70–99)
GLUCOSE SERPL-MCNC: 158 MG/DL — HIGH (ref 70–99)
GLUCOSE SERPL-MCNC: 187 MG/DL — HIGH (ref 70–99)
GLUCOSE SERPL-MCNC: 189 MG/DL — HIGH (ref 70–99)
GLUCOSE SERPL-MCNC: 191 MG/DL — HIGH (ref 70–99)
GLUCOSE SERPL-MCNC: 223 MG/DL — HIGH (ref 70–99)
GLUCOSE SERPL-MCNC: 228 MG/DL — HIGH (ref 70–99)
GLUCOSE SERPL-MCNC: 273 MG/DL — HIGH (ref 70–99)
GLUCOSE SERPL-MCNC: 286 MG/DL — HIGH (ref 70–99)
GLUCOSE SERPL-MCNC: 313 MG/DL — HIGH (ref 70–99)
GLUCOSE UR QL: NEGATIVE — SIGNIFICANT CHANGE UP
HCO3 BLDA-SCNC: 23 MMOL/L — SIGNIFICANT CHANGE UP (ref 21–28)
HCO3 BLDA-SCNC: 23 MMOL/L — SIGNIFICANT CHANGE UP (ref 21–28)
HCO3 BLDA-SCNC: 24 MMOL/L — SIGNIFICANT CHANGE UP (ref 21–28)
HCT VFR BLD CALC: 35.6 % — LOW (ref 39–50)
HCT VFR BLD CALC: 36 % — LOW (ref 39–50)
HCT VFR BLD CALC: 36.5 % — LOW (ref 39–50)
HCT VFR BLD CALC: 36.5 % — LOW (ref 39–50)
HCT VFR BLD CALC: 37.1 % — LOW (ref 39–50)
HCT VFR BLD CALC: 37.6 % — LOW (ref 39–50)
HCT VFR BLD CALC: 38.4 % — LOW (ref 39–50)
HCT VFR BLD CALC: 39.2 % — SIGNIFICANT CHANGE UP (ref 39–50)
HCT VFR BLD CALC: 41.2 % — SIGNIFICANT CHANGE UP (ref 39–50)
HCT VFR BLD CALC: 44.3 % — SIGNIFICANT CHANGE UP (ref 39–50)
HDLC SERPL-MCNC: 17 MG/DL — LOW
HGB BLD-MCNC: 11.8 G/DL — LOW (ref 13–17)
HGB BLD-MCNC: 11.8 G/DL — LOW (ref 13–17)
HGB BLD-MCNC: 11.9 G/DL — LOW (ref 13–17)
HGB BLD-MCNC: 12 G/DL — LOW (ref 13–17)
HGB BLD-MCNC: 12.2 G/DL — LOW (ref 13–17)
HGB BLD-MCNC: 12.4 G/DL — LOW (ref 13–17)
HGB BLD-MCNC: 13 G/DL — SIGNIFICANT CHANGE UP (ref 13–17)
HGB BLD-MCNC: 13 G/DL — SIGNIFICANT CHANGE UP (ref 13–17)
HGB BLD-MCNC: 13.9 G/DL — SIGNIFICANT CHANGE UP (ref 13–17)
HGB BLD-MCNC: 15.4 G/DL — SIGNIFICANT CHANGE UP (ref 13–17)
IMM GRANULOCYTES NFR BLD AUTO: 0.4 % — SIGNIFICANT CHANGE UP (ref 0–1.5)
IMM GRANULOCYTES NFR BLD AUTO: 0.4 % — SIGNIFICANT CHANGE UP (ref 0–1.5)
IMM GRANULOCYTES NFR BLD AUTO: 0.5 % — SIGNIFICANT CHANGE UP (ref 0–1.5)
IMM GRANULOCYTES NFR BLD AUTO: 0.7 % — SIGNIFICANT CHANGE UP (ref 0–1.5)
IMM GRANULOCYTES NFR BLD AUTO: 0.8 % — SIGNIFICANT CHANGE UP (ref 0–1.5)
IMM GRANULOCYTES NFR BLD AUTO: 1.3 % — SIGNIFICANT CHANGE UP (ref 0–1.5)
IMM GRANULOCYTES NFR BLD AUTO: 1.8 % — HIGH (ref 0–1.5)
INR BLD: 1.39 — HIGH (ref 0.88–1.16)
INR BLD: 1.62 — HIGH (ref 0.88–1.16)
KETONES UR-MCNC: NEGATIVE — SIGNIFICANT CHANGE UP
LACTATE SERPL-SCNC: 0.9 MMOL/L — SIGNIFICANT CHANGE UP (ref 0.5–2)
LACTATE SERPL-SCNC: 1.7 MMOL/L — SIGNIFICANT CHANGE UP (ref 0.5–2)
LACTATE SERPL-SCNC: 2 MMOL/L — SIGNIFICANT CHANGE UP (ref 0.5–2)
LACTATE SERPL-SCNC: 2.1 MMOL/L — HIGH (ref 0.5–2)
LACTATE SERPL-SCNC: 2.5 MMOL/L — HIGH (ref 0.4–2)
LACTATE SERPL-SCNC: 2.5 MMOL/L — HIGH (ref 0.5–2)
LACTATE SERPL-SCNC: 3.3 MMOL/L — HIGH (ref 0.4–2)
LEUKOCYTE ESTERASE UR-ACNC: NEGATIVE — SIGNIFICANT CHANGE UP
LIPID PNL WITH DIRECT LDL SERPL: 28 MG/DL — SIGNIFICANT CHANGE UP
LYMPHOCYTES # BLD AUTO: 0.76 K/UL — LOW (ref 1–3.3)
LYMPHOCYTES # BLD AUTO: 0.96 K/UL — LOW (ref 1–3.3)
LYMPHOCYTES # BLD AUTO: 1.07 K/UL — SIGNIFICANT CHANGE UP (ref 1–3.3)
LYMPHOCYTES # BLD AUTO: 1.17 K/UL — SIGNIFICANT CHANGE UP (ref 1–3.3)
LYMPHOCYTES # BLD AUTO: 1.24 K/UL — SIGNIFICANT CHANGE UP (ref 1–3.3)
LYMPHOCYTES # BLD AUTO: 1.28 K/UL — SIGNIFICANT CHANGE UP (ref 1–3.3)
LYMPHOCYTES # BLD AUTO: 1.34 K/UL — SIGNIFICANT CHANGE UP (ref 1–3.3)
LYMPHOCYTES # BLD AUTO: 1.64 K/UL — SIGNIFICANT CHANGE UP (ref 1–3.3)
LYMPHOCYTES # BLD AUTO: 10.7 % — LOW (ref 13–44)
LYMPHOCYTES # BLD AUTO: 12.7 % — LOW (ref 13–44)
LYMPHOCYTES # BLD AUTO: 13.6 % — SIGNIFICANT CHANGE UP (ref 13–44)
LYMPHOCYTES # BLD AUTO: 18.6 % — SIGNIFICANT CHANGE UP (ref 13–44)
LYMPHOCYTES # BLD AUTO: 2.7 K/UL — SIGNIFICANT CHANGE UP (ref 1–3.3)
LYMPHOCYTES # BLD AUTO: 31 % — SIGNIFICANT CHANGE UP (ref 13–44)
LYMPHOCYTES # BLD AUTO: 5.1 % — LOW (ref 13–44)
LYMPHOCYTES # BLD AUTO: 7 % — LOW (ref 13–44)
LYMPHOCYTES # BLD AUTO: 7.1 % — LOW (ref 13–44)
LYMPHOCYTES # BLD AUTO: 7.9 % — LOW (ref 13–44)
MAGNESIUM SERPL-MCNC: 1.6 MG/DL — SIGNIFICANT CHANGE UP (ref 1.6–2.6)
MAGNESIUM SERPL-MCNC: 1.9 MG/DL — SIGNIFICANT CHANGE UP (ref 1.6–2.6)
MAGNESIUM SERPL-MCNC: 1.9 MG/DL — SIGNIFICANT CHANGE UP (ref 1.6–2.6)
MAGNESIUM SERPL-MCNC: 2 MG/DL — SIGNIFICANT CHANGE UP (ref 1.6–2.6)
MAGNESIUM SERPL-MCNC: 2.2 MG/DL — SIGNIFICANT CHANGE UP (ref 1.6–2.6)
MAGNESIUM SERPL-MCNC: 2.3 MG/DL — SIGNIFICANT CHANGE UP (ref 1.6–2.6)
MANUAL SMEAR VERIFICATION: SIGNIFICANT CHANGE UP
MANUAL SMEAR VERIFICATION: SIGNIFICANT CHANGE UP
MCHC RBC-ENTMCNC: 28.2 PG — SIGNIFICANT CHANGE UP (ref 27–34)
MCHC RBC-ENTMCNC: 28.5 PG — SIGNIFICANT CHANGE UP (ref 27–34)
MCHC RBC-ENTMCNC: 28.6 PG — SIGNIFICANT CHANGE UP (ref 27–34)
MCHC RBC-ENTMCNC: 29 PG — SIGNIFICANT CHANGE UP (ref 27–34)
MCHC RBC-ENTMCNC: 29.1 PG — SIGNIFICANT CHANGE UP (ref 27–34)
MCHC RBC-ENTMCNC: 29.4 PG — SIGNIFICANT CHANGE UP (ref 27–34)
MCHC RBC-ENTMCNC: 29.5 PG — SIGNIFICANT CHANGE UP (ref 27–34)
MCHC RBC-ENTMCNC: 30.3 PG — SIGNIFICANT CHANGE UP (ref 27–34)
MCHC RBC-ENTMCNC: 32.3 GM/DL — SIGNIFICANT CHANGE UP (ref 32–36)
MCHC RBC-ENTMCNC: 32.6 GM/DL — SIGNIFICANT CHANGE UP (ref 32–36)
MCHC RBC-ENTMCNC: 32.9 GM/DL — SIGNIFICANT CHANGE UP (ref 32–36)
MCHC RBC-ENTMCNC: 33 GM/DL — SIGNIFICANT CHANGE UP (ref 32–36)
MCHC RBC-ENTMCNC: 33.1 GM/DL — SIGNIFICANT CHANGE UP (ref 32–36)
MCHC RBC-ENTMCNC: 33.2 GM/DL — SIGNIFICANT CHANGE UP (ref 32–36)
MCHC RBC-ENTMCNC: 33.3 GM/DL — SIGNIFICANT CHANGE UP (ref 32–36)
MCHC RBC-ENTMCNC: 33.7 GM/DL — SIGNIFICANT CHANGE UP (ref 32–36)
MCHC RBC-ENTMCNC: 33.9 GM/DL — SIGNIFICANT CHANGE UP (ref 32–36)
MCHC RBC-ENTMCNC: 34.8 GM/DL — SIGNIFICANT CHANGE UP (ref 32–36)
MCV RBC AUTO: 86 FL — SIGNIFICANT CHANGE UP (ref 80–100)
MCV RBC AUTO: 86 FL — SIGNIFICANT CHANGE UP (ref 80–100)
MCV RBC AUTO: 86.6 FL — SIGNIFICANT CHANGE UP (ref 80–100)
MCV RBC AUTO: 86.9 FL — SIGNIFICANT CHANGE UP (ref 80–100)
MCV RBC AUTO: 87 FL — SIGNIFICANT CHANGE UP (ref 80–100)
MCV RBC AUTO: 87.1 FL — SIGNIFICANT CHANGE UP (ref 80–100)
MCV RBC AUTO: 87.2 FL — SIGNIFICANT CHANGE UP (ref 80–100)
MCV RBC AUTO: 87.3 FL — SIGNIFICANT CHANGE UP (ref 80–100)
MCV RBC AUTO: 87.5 FL — SIGNIFICANT CHANGE UP (ref 80–100)
MCV RBC AUTO: 88.5 FL — SIGNIFICANT CHANGE UP (ref 80–100)
MICROCYTES BLD QL: SIGNIFICANT CHANGE UP
MICROCYTES BLD QL: SLIGHT — SIGNIFICANT CHANGE UP
MONOCYTES # BLD AUTO: 0.27 K/UL — SIGNIFICANT CHANGE UP (ref 0–0.9)
MONOCYTES # BLD AUTO: 0.28 K/UL — SIGNIFICANT CHANGE UP (ref 0–0.9)
MONOCYTES # BLD AUTO: 0.53 K/UL — SIGNIFICANT CHANGE UP (ref 0–0.9)
MONOCYTES # BLD AUTO: 0.54 K/UL — SIGNIFICANT CHANGE UP (ref 0–0.9)
MONOCYTES # BLD AUTO: 0.54 K/UL — SIGNIFICANT CHANGE UP (ref 0–0.9)
MONOCYTES # BLD AUTO: 0.66 K/UL — SIGNIFICANT CHANGE UP (ref 0–0.9)
MONOCYTES # BLD AUTO: 0.67 K/UL — SIGNIFICANT CHANGE UP (ref 0–0.9)
MONOCYTES # BLD AUTO: 0.78 K/UL — SIGNIFICANT CHANGE UP (ref 0–0.9)
MONOCYTES # BLD AUTO: 1.03 K/UL — HIGH (ref 0–0.9)
MONOCYTES NFR BLD AUTO: 1.8 % — LOW (ref 2–14)
MONOCYTES NFR BLD AUTO: 2.6 % — SIGNIFICANT CHANGE UP (ref 2–14)
MONOCYTES NFR BLD AUTO: 4.2 % — SIGNIFICANT CHANGE UP (ref 2–14)
MONOCYTES NFR BLD AUTO: 5.5 % — SIGNIFICANT CHANGE UP (ref 2–14)
MONOCYTES NFR BLD AUTO: 5.7 % — SIGNIFICANT CHANGE UP (ref 2–14)
MONOCYTES NFR BLD AUTO: 6 % — SIGNIFICANT CHANGE UP (ref 2–14)
MONOCYTES NFR BLD AUTO: 6.2 % — SIGNIFICANT CHANGE UP (ref 2–14)
MONOCYTES NFR BLD AUTO: 6.7 % — SIGNIFICANT CHANGE UP (ref 2–14)
MONOCYTES NFR BLD AUTO: 6.8 % — SIGNIFICANT CHANGE UP (ref 2–14)
MRSA PCR RESULT.: NEGATIVE — SIGNIFICANT CHANGE UP
NEUTROPHILS # BLD AUTO: 13.37 K/UL — HIGH (ref 1.8–7.4)
NEUTROPHILS # BLD AUTO: 15.26 K/UL — HIGH (ref 1.8–7.4)
NEUTROPHILS # BLD AUTO: 16.84 K/UL — HIGH (ref 1.8–7.4)
NEUTROPHILS # BLD AUTO: 5.34 K/UL — SIGNIFICANT CHANGE UP (ref 1.8–7.4)
NEUTROPHILS # BLD AUTO: 6.4 K/UL — SIGNIFICANT CHANGE UP (ref 1.8–7.4)
NEUTROPHILS # BLD AUTO: 7.73 K/UL — HIGH (ref 1.8–7.4)
NEUTROPHILS # BLD AUTO: 7.75 K/UL — HIGH (ref 1.8–7.4)
NEUTROPHILS # BLD AUTO: 8.25 K/UL — HIGH (ref 1.8–7.4)
NEUTROPHILS # BLD AUTO: 9.76 K/UL — HIGH (ref 1.8–7.4)
NEUTROPHILS NFR BLD AUTO: 61.4 % — SIGNIFICANT CHANGE UP (ref 43–77)
NEUTROPHILS NFR BLD AUTO: 72.7 % — SIGNIFICANT CHANGE UP (ref 43–77)
NEUTROPHILS NFR BLD AUTO: 78.8 % — HIGH (ref 43–77)
NEUTROPHILS NFR BLD AUTO: 79.4 % — HIGH (ref 43–77)
NEUTROPHILS NFR BLD AUTO: 82.1 % — HIGH (ref 43–77)
NEUTROPHILS NFR BLD AUTO: 85.1 % — HIGH (ref 43–77)
NEUTROPHILS NFR BLD AUTO: 87.7 % — HIGH (ref 43–77)
NEUTROPHILS NFR BLD AUTO: 89.7 % — HIGH (ref 43–77)
NEUTROPHILS NFR BLD AUTO: 90.4 % — HIGH (ref 43–77)
NEUTS BAND # BLD: 2.6 % — SIGNIFICANT CHANGE UP (ref 0–8)
NITRITE UR-MCNC: NEGATIVE — SIGNIFICANT CHANGE UP
NON HDL CHOLESTEROL: 51 MG/DL — SIGNIFICANT CHANGE UP
NRBC # BLD: 0 /100 WBCS — SIGNIFICANT CHANGE UP (ref 0–0)
NT-PROBNP SERPL-SCNC: 142 PG/ML — SIGNIFICANT CHANGE UP
OVALOCYTES BLD QL SMEAR: SLIGHT — SIGNIFICANT CHANGE UP
OVALOCYTES BLD QL SMEAR: SLIGHT — SIGNIFICANT CHANGE UP
PCO2 BLDA: 31 MMHG — LOW (ref 35–48)
PCO2 BLDA: 32 MMHG — LOW (ref 35–48)
PCO2 BLDA: 33 MMHG — LOW (ref 35–48)
PH BLDA: 7.46 — HIGH (ref 7.35–7.45)
PH BLDA: 7.47 — HIGH (ref 7.35–7.45)
PH BLDA: 7.47 — HIGH (ref 7.35–7.45)
PH UR: 6 — SIGNIFICANT CHANGE UP (ref 5–8)
PHOSPHATE SERPL-MCNC: 2.3 MG/DL — LOW (ref 2.5–4.5)
PHOSPHATE SERPL-MCNC: 2.3 MG/DL — LOW (ref 2.5–4.5)
PHOSPHATE SERPL-MCNC: 2.4 MG/DL — LOW (ref 2.5–4.5)
PHOSPHATE SERPL-MCNC: 2.9 MG/DL — SIGNIFICANT CHANGE UP (ref 2.5–4.5)
PHOSPHATE SERPL-MCNC: 3.1 MG/DL — SIGNIFICANT CHANGE UP (ref 2.5–4.5)
PHOSPHATE SERPL-MCNC: 3.1 MG/DL — SIGNIFICANT CHANGE UP (ref 2.5–4.5)
PHOSPHATE SERPL-MCNC: 3.8 MG/DL — SIGNIFICANT CHANGE UP (ref 2.5–4.5)
PLAT MORPH BLD: ABNORMAL
PLAT MORPH BLD: NORMAL — SIGNIFICANT CHANGE UP
PLATELET # BLD AUTO: 105 K/UL — LOW (ref 150–400)
PLATELET # BLD AUTO: 133 K/UL — LOW (ref 150–400)
PLATELET # BLD AUTO: 137 K/UL — LOW (ref 150–400)
PLATELET # BLD AUTO: 144 K/UL — LOW (ref 150–400)
PLATELET # BLD AUTO: 161 K/UL — SIGNIFICANT CHANGE UP (ref 150–400)
PLATELET # BLD AUTO: 168 K/UL — SIGNIFICANT CHANGE UP (ref 150–400)
PLATELET # BLD AUTO: 182 K/UL — SIGNIFICANT CHANGE UP (ref 150–400)
PLATELET # BLD AUTO: 197 K/UL — SIGNIFICANT CHANGE UP (ref 150–400)
PLATELET # BLD AUTO: 348 K/UL — SIGNIFICANT CHANGE UP (ref 150–400)
PLATELET # BLD AUTO: 391 K/UL — SIGNIFICANT CHANGE UP (ref 150–400)
PO2 BLDA: 138 MMHG — HIGH (ref 83–108)
PO2 BLDA: 177 MMHG — HIGH (ref 83–108)
PO2 BLDA: 180 MMHG — HIGH (ref 83–108)
POIKILOCYTOSIS BLD QL AUTO: SLIGHT — SIGNIFICANT CHANGE UP
POIKILOCYTOSIS BLD QL AUTO: SLIGHT — SIGNIFICANT CHANGE UP
POLYCHROMASIA BLD QL SMEAR: SLIGHT — SIGNIFICANT CHANGE UP
POTASSIUM SERPL-MCNC: 3 MMOL/L — LOW (ref 3.5–5.3)
POTASSIUM SERPL-MCNC: 3 MMOL/L — LOW (ref 3.5–5.3)
POTASSIUM SERPL-MCNC: 3.1 MMOL/L — LOW (ref 3.5–5.3)
POTASSIUM SERPL-MCNC: 3.3 MMOL/L — LOW (ref 3.5–5.3)
POTASSIUM SERPL-MCNC: 3.3 MMOL/L — LOW (ref 3.5–5.3)
POTASSIUM SERPL-MCNC: 3.4 MMOL/L — LOW (ref 3.5–5.3)
POTASSIUM SERPL-MCNC: 3.4 MMOL/L — LOW (ref 3.5–5.3)
POTASSIUM SERPL-MCNC: 3.5 MMOL/L — SIGNIFICANT CHANGE UP (ref 3.5–5.3)
POTASSIUM SERPL-MCNC: 3.6 MMOL/L — SIGNIFICANT CHANGE UP (ref 3.5–5.3)
POTASSIUM SERPL-MCNC: 3.6 MMOL/L — SIGNIFICANT CHANGE UP (ref 3.5–5.3)
POTASSIUM SERPL-MCNC: 4.5 MMOL/L — SIGNIFICANT CHANGE UP (ref 3.5–5.3)
POTASSIUM SERPL-SCNC: 3 MMOL/L — LOW (ref 3.5–5.3)
POTASSIUM SERPL-SCNC: 3 MMOL/L — LOW (ref 3.5–5.3)
POTASSIUM SERPL-SCNC: 3.1 MMOL/L — LOW (ref 3.5–5.3)
POTASSIUM SERPL-SCNC: 3.3 MMOL/L — LOW (ref 3.5–5.3)
POTASSIUM SERPL-SCNC: 3.3 MMOL/L — LOW (ref 3.5–5.3)
POTASSIUM SERPL-SCNC: 3.4 MMOL/L — LOW (ref 3.5–5.3)
POTASSIUM SERPL-SCNC: 3.4 MMOL/L — LOW (ref 3.5–5.3)
POTASSIUM SERPL-SCNC: 3.5 MMOL/L — SIGNIFICANT CHANGE UP (ref 3.5–5.3)
POTASSIUM SERPL-SCNC: 3.6 MMOL/L — SIGNIFICANT CHANGE UP (ref 3.5–5.3)
POTASSIUM SERPL-SCNC: 3.6 MMOL/L — SIGNIFICANT CHANGE UP (ref 3.5–5.3)
POTASSIUM SERPL-SCNC: 4.5 MMOL/L — SIGNIFICANT CHANGE UP (ref 3.5–5.3)
PROCALCITONIN SERPL-MCNC: 4.62 NG/ML — HIGH (ref 0.02–0.1)
PROT SERPL-MCNC: 5.3 G/DL — LOW (ref 6–8.3)
PROT SERPL-MCNC: 5.6 G/DL — LOW (ref 6–8.3)
PROT SERPL-MCNC: 5.8 G/DL — LOW (ref 6–8.3)
PROT SERPL-MCNC: 5.9 G/DL — LOW (ref 6–8.3)
PROT SERPL-MCNC: 5.9 G/DL — LOW (ref 6–8.3)
PROT SERPL-MCNC: 6.1 G/DL — SIGNIFICANT CHANGE UP (ref 6–8.3)
PROT SERPL-MCNC: 7.2 G/DL — SIGNIFICANT CHANGE UP (ref 6.4–8.2)
PROT SERPL-MCNC: 7.5 G/DL — SIGNIFICANT CHANGE UP (ref 6.4–8.2)
PROT UR-MCNC: ABNORMAL MG/DL
PROTHROM AB SERPL-ACNC: 16.3 SEC — HIGH (ref 10.5–13.4)
PROTHROM AB SERPL-ACNC: 19.4 SEC — HIGH (ref 10.5–13.4)
RBC # BLD: 4.14 M/UL — LOW (ref 4.2–5.8)
RBC # BLD: 4.14 M/UL — LOW (ref 4.2–5.8)
RBC # BLD: 4.18 M/UL — LOW (ref 4.2–5.8)
RBC # BLD: 4.19 M/UL — LOW (ref 4.2–5.8)
RBC # BLD: 4.19 M/UL — LOW (ref 4.2–5.8)
RBC # BLD: 4.34 M/UL — SIGNIFICANT CHANGE UP (ref 4.2–5.8)
RBC # BLD: 4.42 M/UL — SIGNIFICANT CHANGE UP (ref 4.2–5.8)
RBC # BLD: 4.56 M/UL — SIGNIFICANT CHANGE UP (ref 4.2–5.8)
RBC # BLD: 4.71 M/UL — SIGNIFICANT CHANGE UP (ref 4.2–5.8)
RBC # BLD: 5.08 M/UL — SIGNIFICANT CHANGE UP (ref 4.2–5.8)
RBC # FLD: 13 % — SIGNIFICANT CHANGE UP (ref 10.3–14.5)
RBC # FLD: 13.5 % — SIGNIFICANT CHANGE UP (ref 10.3–14.5)
RBC # FLD: 13.7 % — SIGNIFICANT CHANGE UP (ref 10.3–14.5)
RBC # FLD: 13.8 % — SIGNIFICANT CHANGE UP (ref 10.3–14.5)
RBC # FLD: 13.9 % — SIGNIFICANT CHANGE UP (ref 10.3–14.5)
RBC # FLD: 13.9 % — SIGNIFICANT CHANGE UP (ref 10.3–14.5)
RBC # FLD: 14.1 % — SIGNIFICANT CHANGE UP (ref 10.3–14.5)
RBC BLD AUTO: ABNORMAL
RBC BLD AUTO: ABNORMAL
RBC CASTS # UR COMP ASSIST: < 5 /HPF — SIGNIFICANT CHANGE UP
RH IG SCN BLD-IMP: POSITIVE — SIGNIFICANT CHANGE UP
RH IG SCN BLD-IMP: POSITIVE — SIGNIFICANT CHANGE UP
S AUREUS DNA NOSE QL NAA+PROBE: NEGATIVE — SIGNIFICANT CHANGE UP
SAO2 % BLDA: 98.4 % — HIGH (ref 94–98)
SAO2 % BLDA: 98.5 % — HIGH (ref 94–98)
SAO2 % BLDA: 98.7 % — HIGH (ref 94–98)
SARS-COV-2 RNA SPEC QL NAA+PROBE: SIGNIFICANT CHANGE UP
SODIUM SERPL-SCNC: 138 MMOL/L — SIGNIFICANT CHANGE UP (ref 132–145)
SODIUM SERPL-SCNC: 139 MMOL/L — SIGNIFICANT CHANGE UP (ref 132–145)
SODIUM SERPL-SCNC: 140 MMOL/L — SIGNIFICANT CHANGE UP (ref 135–145)
SODIUM SERPL-SCNC: 141 MMOL/L — SIGNIFICANT CHANGE UP (ref 135–145)
SODIUM SERPL-SCNC: 141 MMOL/L — SIGNIFICANT CHANGE UP (ref 135–145)
SODIUM SERPL-SCNC: 142 MMOL/L — SIGNIFICANT CHANGE UP (ref 135–145)
SODIUM SERPL-SCNC: 143 MMOL/L — SIGNIFICANT CHANGE UP (ref 135–145)
SODIUM SERPL-SCNC: 144 MMOL/L — SIGNIFICANT CHANGE UP (ref 135–145)
SODIUM SERPL-SCNC: 145 MMOL/L — SIGNIFICANT CHANGE UP (ref 135–145)
SP GR SPEC: <=1.005 — SIGNIFICANT CHANGE UP (ref 1–1.03)
SPECIMEN SOURCE: SIGNIFICANT CHANGE UP
SPHEROCYTES BLD QL SMEAR: SLIGHT — SIGNIFICANT CHANGE UP
SPHEROCYTES BLD QL SMEAR: SLIGHT — SIGNIFICANT CHANGE UP
TRIGL SERPL-MCNC: 113 MG/DL — SIGNIFICANT CHANGE UP
TROPONIN I, HIGH SENSITIVITY RESULT: 17.6 NG/L — SIGNIFICANT CHANGE UP
TROPONIN I, HIGH SENSITIVITY RESULT: 21.5 NG/L — SIGNIFICANT CHANGE UP
TROPONIN T SERPL-MCNC: 0.03 NG/ML — HIGH (ref 0–0.01)
TSH SERPL-MCNC: 2.4 UIU/ML — SIGNIFICANT CHANGE UP (ref 0.36–3.74)
UROBILINOGEN FLD QL: 0.2 E.U./DL — SIGNIFICANT CHANGE UP
WBC # BLD: 10.04 K/UL — SIGNIFICANT CHANGE UP (ref 3.8–10.5)
WBC # BLD: 10.8 K/UL — HIGH (ref 3.8–10.5)
WBC # BLD: 14.81 K/UL — HIGH (ref 3.8–10.5)
WBC # BLD: 16.66 K/UL — HIGH (ref 3.8–10.5)
WBC # BLD: 17.95 K/UL — HIGH (ref 3.8–10.5)
WBC # BLD: 18.77 K/UL — HIGH (ref 3.8–10.5)
WBC # BLD: 8.7 K/UL — SIGNIFICANT CHANGE UP (ref 3.8–10.5)
WBC # BLD: 8.8 K/UL — SIGNIFICANT CHANGE UP (ref 3.8–10.5)
WBC # BLD: 9.76 K/UL — SIGNIFICANT CHANGE UP (ref 3.8–10.5)
WBC # BLD: 9.82 K/UL — SIGNIFICANT CHANGE UP (ref 3.8–10.5)
WBC # FLD AUTO: 10.04 K/UL — SIGNIFICANT CHANGE UP (ref 3.8–10.5)
WBC # FLD AUTO: 10.8 K/UL — HIGH (ref 3.8–10.5)
WBC # FLD AUTO: 14.81 K/UL — HIGH (ref 3.8–10.5)
WBC # FLD AUTO: 16.66 K/UL — HIGH (ref 3.8–10.5)
WBC # FLD AUTO: 17.95 K/UL — HIGH (ref 3.8–10.5)
WBC # FLD AUTO: 18.77 K/UL — HIGH (ref 3.8–10.5)
WBC # FLD AUTO: 8.7 K/UL — SIGNIFICANT CHANGE UP (ref 3.8–10.5)
WBC # FLD AUTO: 8.8 K/UL — SIGNIFICANT CHANGE UP (ref 3.8–10.5)
WBC # FLD AUTO: 9.76 K/UL — SIGNIFICANT CHANGE UP (ref 3.8–10.5)
WBC # FLD AUTO: 9.82 K/UL — SIGNIFICANT CHANGE UP (ref 3.8–10.5)
WBC UR QL: < 5 /HPF — SIGNIFICANT CHANGE UP

## 2022-01-01 PROCEDURE — 93010 ELECTROCARDIOGRAM REPORT: CPT

## 2022-01-01 PROCEDURE — 74019 RADEX ABDOMEN 2 VIEWS: CPT | Mod: 26

## 2022-01-01 PROCEDURE — 99215 OFFICE O/P EST HI 40 MIN: CPT | Mod: 95

## 2022-01-01 PROCEDURE — G2212 PROLONG OUTPT/OFFICE VIS: CPT | Mod: 95

## 2022-01-01 PROCEDURE — 99285 EMERGENCY DEPT VISIT HI MDM: CPT | Mod: 25

## 2022-01-01 PROCEDURE — 99214 OFFICE O/P EST MOD 30 MIN: CPT | Mod: 95

## 2022-01-01 PROCEDURE — 71045 X-RAY EXAM CHEST 1 VIEW: CPT | Mod: 26

## 2022-01-01 PROCEDURE — 99222 1ST HOSP IP/OBS MODERATE 55: CPT | Mod: GC

## 2022-01-01 PROCEDURE — 78226 HEPATOBILIARY SYSTEM IMAGING: CPT | Mod: 26

## 2022-01-01 PROCEDURE — 11900 INJECT SKIN LESIONS </W 7: CPT

## 2022-01-01 PROCEDURE — 99285 EMERGENCY DEPT VISIT HI MDM: CPT | Mod: FS

## 2022-01-01 PROCEDURE — 99233 SBSQ HOSP IP/OBS HIGH 50: CPT

## 2022-01-01 PROCEDURE — 90837 PSYTX W PT 60 MINUTES: CPT

## 2022-01-01 PROCEDURE — 76937 US GUIDE VASCULAR ACCESS: CPT | Mod: 26

## 2022-01-01 PROCEDURE — 99233 SBSQ HOSP IP/OBS HIGH 50: CPT | Mod: GC

## 2022-01-01 PROCEDURE — 73030 X-RAY EXAM OF SHOULDER: CPT | Mod: 26,RT

## 2022-01-01 PROCEDURE — 99232 SBSQ HOSP IP/OBS MODERATE 35: CPT | Mod: GC

## 2022-01-01 PROCEDURE — 93880 EXTRACRANIAL BILAT STUDY: CPT | Mod: 26

## 2022-01-01 PROCEDURE — 70450 CT HEAD/BRAIN W/O DYE: CPT | Mod: 26,MH

## 2022-01-01 PROCEDURE — 0042T: CPT

## 2022-01-01 PROCEDURE — 99497 ADVNCD CARE PLAN 30 MIN: CPT | Mod: 25

## 2022-01-01 PROCEDURE — 99223 1ST HOSP IP/OBS HIGH 75: CPT

## 2022-01-01 PROCEDURE — 74230 X-RAY XM SWLNG FUNCJ C+: CPT | Mod: 26

## 2022-01-01 PROCEDURE — 99214 OFFICE O/P EST MOD 30 MIN: CPT

## 2022-01-01 PROCEDURE — 99447 NTRPROF PH1/NTRNET/EHR 11-20: CPT

## 2022-01-01 PROCEDURE — 93306 TTE W/DOPPLER COMPLETE: CPT | Mod: 26

## 2022-01-01 PROCEDURE — 70498 CT ANGIOGRAPHY NECK: CPT | Mod: 26,MH

## 2022-01-01 PROCEDURE — 99214 OFFICE O/P EST MOD 30 MIN: CPT | Mod: 25

## 2022-01-01 PROCEDURE — 76705 ECHO EXAM OF ABDOMEN: CPT | Mod: 26

## 2022-01-01 PROCEDURE — 36556 INSERT NON-TUNNEL CV CATH: CPT

## 2022-01-01 PROCEDURE — 90832 PSYTX W PT 30 MINUTES: CPT | Mod: 95

## 2022-01-01 PROCEDURE — 70551 MRI BRAIN STEM W/O DYE: CPT | Mod: 26

## 2022-01-01 PROCEDURE — 74177 CT ABD & PELVIS W/CONTRAST: CPT | Mod: 26

## 2022-01-01 PROCEDURE — 99221 1ST HOSP IP/OBS SF/LOW 40: CPT

## 2022-01-01 PROCEDURE — 99232 SBSQ HOSP IP/OBS MODERATE 35: CPT

## 2022-01-01 PROCEDURE — 99223 1ST HOSP IP/OBS HIGH 75: CPT | Mod: GC

## 2022-01-01 PROCEDURE — 71275 CT ANGIOGRAPHY CHEST: CPT | Mod: 26

## 2022-01-01 PROCEDURE — 70496 CT ANGIOGRAPHY HEAD: CPT | Mod: 26,MH

## 2022-01-01 PROCEDURE — 99212 OFFICE O/P EST SF 10 MIN: CPT | Mod: 95

## 2022-01-01 PROCEDURE — 99358 PROLONG SERVICE W/O CONTACT: CPT | Mod: NC

## 2022-01-01 RX ORDER — QUINAPRIL HYDROCHLORIDE 40 MG/1
1 TABLET, FILM COATED ORAL
Qty: 0 | Refills: 0 | DISCHARGE

## 2022-01-01 RX ORDER — ATORVASTATIN CALCIUM 80 MG/1
40 TABLET, FILM COATED ORAL AT BEDTIME
Refills: 0 | Status: DISCONTINUED | OUTPATIENT
Start: 2022-01-01 | End: 2022-01-01

## 2022-01-01 RX ORDER — SODIUM CHLORIDE 9 MG/ML
1000 INJECTION, SOLUTION INTRAVENOUS
Refills: 0 | Status: DISCONTINUED | OUTPATIENT
Start: 2022-01-01 | End: 2022-01-01

## 2022-01-01 RX ORDER — CARBIDOPA AND LEVODOPA 25; 100 MG/1; MG/1
0.5 TABLET ORAL
Qty: 0 | Refills: 0 | DISCHARGE

## 2022-01-01 RX ORDER — POTASSIUM CHLORIDE 20 MEQ
40 PACKET (EA) ORAL ONCE
Refills: 0 | Status: COMPLETED | OUTPATIENT
Start: 2022-01-01 | End: 2022-01-01

## 2022-01-01 RX ORDER — SENNA PLUS 8.6 MG/1
1 TABLET ORAL EVERY 24 HOURS
Refills: 0 | Status: DISCONTINUED | OUTPATIENT
Start: 2022-01-01 | End: 2022-01-01

## 2022-01-01 RX ORDER — PROPOFOL 10 MG/ML
10 INJECTION, EMULSION INTRAVENOUS
Qty: 1000 | Refills: 0 | Status: DISCONTINUED | OUTPATIENT
Start: 2022-01-01 | End: 2022-01-01

## 2022-01-01 RX ORDER — HYDROMORPHONE HYDROCHLORIDE 2 MG/ML
1 INJECTION INTRAMUSCULAR; INTRAVENOUS; SUBCUTANEOUS
Qty: 10 | Refills: 0 | Status: DISCONTINUED | OUTPATIENT
Start: 2022-01-01 | End: 2022-01-01

## 2022-01-01 RX ORDER — CEPHALEXIN 500 MG/1
500 CAPSULE ORAL
Qty: 21 | Refills: 0 | Status: DISCONTINUED | COMMUNITY
Start: 2021-01-01 | End: 2022-01-01

## 2022-01-01 RX ORDER — POTASSIUM CHLORIDE 20 MEQ
20 PACKET (EA) ORAL ONCE
Refills: 0 | Status: COMPLETED | OUTPATIENT
Start: 2022-01-01 | End: 2022-01-01

## 2022-01-01 RX ORDER — EPLERENONE 25 MG/1
25 TABLET, COATED ORAL DAILY
Refills: 0 | Status: DISCONTINUED | COMMUNITY
Start: 2021-04-28 | End: 2022-01-01

## 2022-01-01 RX ORDER — TRAZODONE HYDROCHLORIDE 50 MG/1
50 TABLET ORAL
Qty: 60 | Refills: 1 | Status: DISCONTINUED | COMMUNITY
Start: 2022-01-01 | End: 2022-01-01

## 2022-01-01 RX ORDER — OLANZAPINE 15 MG/1
1 TABLET, FILM COATED ORAL
Qty: 0 | Refills: 0 | DISCHARGE

## 2022-01-01 RX ORDER — HYDROMORPHONE HYDROCHLORIDE 2 MG/ML
0.5 INJECTION INTRAMUSCULAR; INTRAVENOUS; SUBCUTANEOUS
Qty: 10 | Refills: 0 | Status: DISCONTINUED | OUTPATIENT
Start: 2022-01-01 | End: 2022-01-01

## 2022-01-01 RX ORDER — DEXTROSE 50 % IN WATER 50 %
15 SYRINGE (ML) INTRAVENOUS ONCE
Refills: 0 | Status: DISCONTINUED | OUTPATIENT
Start: 2022-01-01 | End: 2022-01-01

## 2022-01-01 RX ORDER — DOXYCYCLINE HYCLATE 100 MG/1
100 CAPSULE ORAL
Qty: 14 | Refills: 0 | Status: DISCONTINUED | COMMUNITY
Start: 2021-01-01 | End: 2022-01-01

## 2022-01-01 RX ORDER — EMPAGLIFLOZIN 10 MG/1
10 TABLET, FILM COATED ORAL DAILY
Refills: 0 | Status: DISCONTINUED | COMMUNITY
Start: 2021-04-28 | End: 2022-01-01

## 2022-01-01 RX ORDER — MEROPENEM 1 G/30ML
2000 INJECTION INTRAVENOUS ONCE
Refills: 0 | Status: DISCONTINUED | OUTPATIENT
Start: 2022-01-01 | End: 2022-01-01

## 2022-01-01 RX ORDER — OLANZAPINE 7.5 MG/1
7.5 TABLET, FILM COATED ORAL
Qty: 30 | Refills: 1 | Status: ACTIVE | COMMUNITY
Start: 2022-01-01 | End: 1900-01-01

## 2022-01-01 RX ORDER — HEPARIN SODIUM 5000 [USP'U]/ML
5000 INJECTION INTRAVENOUS; SUBCUTANEOUS EVERY 8 HOURS
Refills: 0 | Status: DISCONTINUED | OUTPATIENT
Start: 2022-01-01 | End: 2022-01-01

## 2022-01-01 RX ORDER — SODIUM CHLORIDE 9 MG/ML
1000 INJECTION, SOLUTION INTRAVENOUS ONCE
Refills: 0 | Status: COMPLETED | OUTPATIENT
Start: 2022-01-01 | End: 2022-01-01

## 2022-01-01 RX ORDER — METRONIDAZOLE 500 MG
500 TABLET ORAL ONCE
Refills: 0 | Status: COMPLETED | OUTPATIENT
Start: 2022-01-01 | End: 2022-01-01

## 2022-01-01 RX ORDER — DEXTROSE 50 % IN WATER 50 %
12.5 SYRINGE (ML) INTRAVENOUS ONCE
Refills: 0 | Status: DISCONTINUED | OUTPATIENT
Start: 2022-01-01 | End: 2022-01-01

## 2022-01-01 RX ORDER — DEXTROSE 50 % IN WATER 50 %
25 SYRINGE (ML) INTRAVENOUS ONCE
Refills: 0 | Status: DISCONTINUED | OUTPATIENT
Start: 2022-01-01 | End: 2022-01-01

## 2022-01-01 RX ORDER — LORAZEPAM 1 MG/1
1 TABLET ORAL 4 TIMES DAILY
Qty: 120 | Refills: 0 | Status: ACTIVE | COMMUNITY
Start: 2021-01-01 | End: 1900-01-01

## 2022-01-01 RX ORDER — HYDROMORPHONE HYDROCHLORIDE 2 MG/ML
2 INJECTION INTRAMUSCULAR; INTRAVENOUS; SUBCUTANEOUS
Refills: 0 | Status: DISCONTINUED | OUTPATIENT
Start: 2022-01-01 | End: 2022-01-01

## 2022-01-01 RX ORDER — QUETIAPINE FUMARATE 25 MG/1
25 TABLET ORAL
Qty: 30 | Refills: 2 | Status: DISCONTINUED | COMMUNITY
Start: 2022-01-01 | End: 2022-01-01

## 2022-01-01 RX ORDER — CHOLECALCIFEROL (VITAMIN D3) 125 MCG
1 CAPSULE ORAL
Qty: 0 | Refills: 0 | DISCHARGE

## 2022-01-01 RX ORDER — POTASSIUM PHOSPHATE, MONOBASIC POTASSIUM PHOSPHATE, DIBASIC 236; 224 MG/ML; MG/ML
15 INJECTION, SOLUTION INTRAVENOUS ONCE
Refills: 0 | Status: COMPLETED | OUTPATIENT
Start: 2022-01-01 | End: 2022-01-01

## 2022-01-01 RX ORDER — CEFTRIAXONE 500 MG/1
1000 INJECTION, POWDER, FOR SOLUTION INTRAMUSCULAR; INTRAVENOUS EVERY 24 HOURS
Refills: 0 | Status: DISCONTINUED | OUTPATIENT
Start: 2022-01-01 | End: 2022-01-01

## 2022-01-01 RX ORDER — CLOPIDOGREL BISULFATE 75 MG/1
75 TABLET, FILM COATED ORAL DAILY
Refills: 0 | Status: DISCONTINUED | OUTPATIENT
Start: 2022-01-01 | End: 2022-01-01

## 2022-01-01 RX ORDER — METRONIDAZOLE 500 MG
500 TABLET ORAL EVERY 8 HOURS
Refills: 0 | Status: DISCONTINUED | OUTPATIENT
Start: 2022-01-01 | End: 2022-01-01

## 2022-01-01 RX ORDER — CHLORHEXIDINE GLUCONATE 213 G/1000ML
15 SOLUTION TOPICAL EVERY 12 HOURS
Refills: 0 | Status: DISCONTINUED | OUTPATIENT
Start: 2022-01-01 | End: 2022-01-01

## 2022-01-01 RX ORDER — ACETYLCYSTEINE 200 MG/ML
4 VIAL (ML) MISCELLANEOUS ONCE
Refills: 0 | Status: COMPLETED | OUTPATIENT
Start: 2022-01-01 | End: 2022-01-01

## 2022-01-01 RX ORDER — CHLORHEXIDINE GLUCONATE 213 G/1000ML
1 SOLUTION TOPICAL
Refills: 0 | Status: DISCONTINUED | OUTPATIENT
Start: 2022-01-01 | End: 2022-01-01

## 2022-01-01 RX ORDER — SUVOREXANT 10 MG/1
10 TABLET, FILM COATED ORAL
Qty: 30 | Refills: 0 | Status: DISCONTINUED | COMMUNITY
Start: 2022-01-01 | End: 2022-01-01

## 2022-01-01 RX ORDER — NOREPINEPHRINE BITARTRATE/D5W 8 MG/250ML
0.05 PLASTIC BAG, INJECTION (ML) INTRAVENOUS
Qty: 32 | Refills: 0 | Status: DISCONTINUED | OUTPATIENT
Start: 2022-01-01 | End: 2022-01-01

## 2022-01-01 RX ORDER — AMLODIPINE BESYLATE 2.5 MG/1
1 TABLET ORAL
Qty: 0 | Refills: 0 | DISCHARGE

## 2022-01-01 RX ORDER — HALOPERIDOL DECANOATE 100 MG/ML
1 INJECTION INTRAMUSCULAR
Refills: 0 | Status: DISCONTINUED | OUTPATIENT
Start: 2022-01-01 | End: 2022-01-01

## 2022-01-01 RX ORDER — CARBIDOPA AND LEVODOPA 25; 100 MG/1; MG/1
25-100 TABLET ORAL 4 TIMES DAILY
Refills: 0 | Status: ACTIVE | COMMUNITY
Start: 2021-04-28

## 2022-01-01 RX ORDER — SODIUM CHLORIDE 9 MG/ML
1000 INJECTION INTRAMUSCULAR; INTRAVENOUS; SUBCUTANEOUS ONCE
Refills: 0 | Status: COMPLETED | OUTPATIENT
Start: 2022-01-01 | End: 2022-01-01

## 2022-01-01 RX ORDER — POLYETHYLENE GLYCOL 3350 17 G/17G
17 POWDER, FOR SOLUTION ORAL EVERY 24 HOURS
Refills: 0 | Status: DISCONTINUED | OUTPATIENT
Start: 2022-01-01 | End: 2022-01-01

## 2022-01-01 RX ORDER — OMEGA-3 ACID ETHYL ESTERS 1 G
1 CAPSULE ORAL
Qty: 0 | Refills: 0 | DISCHARGE

## 2022-01-01 RX ORDER — ESCITALOPRAM OXALATE 10 MG/1
10 TABLET ORAL
Qty: 25 | Refills: 0 | Status: DISCONTINUED | COMMUNITY
Start: 2022-01-01 | End: 2022-01-01

## 2022-01-01 RX ORDER — NOREPINEPHRINE BITARTRATE/D5W 8 MG/250ML
0.05 PLASTIC BAG, INJECTION (ML) INTRAVENOUS
Qty: 8 | Refills: 0 | Status: DISCONTINUED | OUTPATIENT
Start: 2022-01-01 | End: 2022-01-01

## 2022-01-01 RX ORDER — AMLODIPINE BESYLATE 10 MG/1
10 TABLET ORAL DAILY
Refills: 0 | Status: DISCONTINUED | COMMUNITY
Start: 2021-04-28 | End: 2022-01-01

## 2022-01-01 RX ORDER — AZITHROMYCIN 500 MG/1
500 TABLET, FILM COATED ORAL EVERY 24 HOURS
Refills: 0 | Status: DISCONTINUED | OUTPATIENT
Start: 2022-01-01 | End: 2022-01-01

## 2022-01-01 RX ORDER — MUPIROCIN 20 MG/G
2 OINTMENT TOPICAL
Qty: 1 | Refills: 3 | Status: ACTIVE | COMMUNITY
Start: 2022-01-01 | End: 1900-01-01

## 2022-01-01 RX ORDER — POTASSIUM CHLORIDE 20 MEQ
40 PACKET (EA) ORAL ONCE
Refills: 0 | Status: DISCONTINUED | OUTPATIENT
Start: 2022-01-01 | End: 2022-01-01

## 2022-01-01 RX ORDER — ACETAMINOPHEN 500 MG
1000 TABLET ORAL ONCE
Refills: 0 | Status: COMPLETED | OUTPATIENT
Start: 2022-01-01 | End: 2022-01-01

## 2022-01-01 RX ORDER — PHENYLEPHRINE HYDROCHLORIDE 10 MG/ML
0.1 INJECTION INTRAVENOUS
Qty: 160 | Refills: 0 | Status: DISCONTINUED | OUTPATIENT
Start: 2022-01-01 | End: 2022-01-01

## 2022-01-01 RX ORDER — ROBINUL 0.2 MG/ML
0.4 INJECTION INTRAMUSCULAR; INTRAVENOUS EVERY 4 HOURS
Refills: 0 | Status: DISCONTINUED | OUTPATIENT
Start: 2022-01-01 | End: 2022-01-01

## 2022-01-01 RX ORDER — OLANZAPINE 5 MG/1
5 TABLET, FILM COATED ORAL
Qty: 30 | Refills: 0 | Status: DISCONTINUED | COMMUNITY
Start: 2022-01-01 | End: 2022-01-01

## 2022-01-01 RX ORDER — INSULIN LISPRO 100/ML
VIAL (ML) SUBCUTANEOUS
Refills: 0 | Status: DISCONTINUED | OUTPATIENT
Start: 2022-01-01 | End: 2022-01-01

## 2022-01-01 RX ORDER — NIFEDIPINE 60 MG/1
60 TABLET, EXTENDED RELEASE ORAL
Refills: 0 | Status: ACTIVE | COMMUNITY
Start: 2022-01-01

## 2022-01-01 RX ORDER — ASPIRIN/CALCIUM CARB/MAGNESIUM 324 MG
81 TABLET ORAL DAILY
Refills: 0 | Status: DISCONTINUED | OUTPATIENT
Start: 2022-01-01 | End: 2022-01-01

## 2022-01-01 RX ORDER — ASPIRIN/CALCIUM CARB/MAGNESIUM 324 MG
1 TABLET ORAL
Qty: 0 | Refills: 0 | DISCHARGE

## 2022-01-01 RX ORDER — CEFTRIAXONE 500 MG/1
2000 INJECTION, POWDER, FOR SOLUTION INTRAMUSCULAR; INTRAVENOUS EVERY 24 HOURS
Refills: 0 | Status: COMPLETED | OUTPATIENT
Start: 2022-01-01 | End: 2022-01-01

## 2022-01-01 RX ORDER — CARBIDOPA AND LEVODOPA 25; 100 MG/1; MG/1
1 TABLET ORAL THREE TIMES A DAY
Refills: 0 | Status: DISCONTINUED | OUTPATIENT
Start: 2022-01-01 | End: 2022-01-01

## 2022-01-01 RX ORDER — SODIUM CHLORIDE 9 MG/ML
1000 INJECTION INTRAMUSCULAR; INTRAVENOUS; SUBCUTANEOUS ONCE
Refills: 0 | Status: DISCONTINUED | OUTPATIENT
Start: 2022-01-01 | End: 2022-01-01

## 2022-01-01 RX ORDER — PANTOPRAZOLE SODIUM 20 MG/1
40 TABLET, DELAYED RELEASE ORAL EVERY 24 HOURS
Refills: 0 | Status: DISCONTINUED | OUTPATIENT
Start: 2022-01-01 | End: 2022-01-01

## 2022-01-01 RX ORDER — DOXEPIN HCL 100 MG
1 CAPSULE ORAL
Qty: 0 | Refills: 0 | DISCHARGE

## 2022-01-01 RX ORDER — MIRTAZAPINE 7.5 MG/1
7.5 TABLET, FILM COATED ORAL
Qty: 30 | Refills: 1 | Status: DISCONTINUED | COMMUNITY
Start: 1900-01-01 | End: 2022-01-01

## 2022-01-01 RX ORDER — KETOCONAZOLE 20 MG/G
2 CREAM TOPICAL
Qty: 1 | Refills: 3 | Status: ACTIVE | COMMUNITY
Start: 2022-01-01 | End: 1900-01-01

## 2022-01-01 RX ORDER — DIAZEPAM 5 MG
2 TABLET ORAL ONCE
Refills: 0 | Status: DISCONTINUED | OUTPATIENT
Start: 2022-01-01 | End: 2022-01-01

## 2022-01-01 RX ORDER — LISINOPRIL 2.5 MG/1
40 TABLET ORAL DAILY
Refills: 0 | Status: DISCONTINUED | OUTPATIENT
Start: 2022-01-01 | End: 2022-01-01

## 2022-01-01 RX ORDER — POTASSIUM CHLORIDE 20 MEQ
10 PACKET (EA) ORAL
Refills: 0 | Status: COMPLETED | OUTPATIENT
Start: 2022-01-01 | End: 2022-01-01

## 2022-01-01 RX ORDER — ROSUVASTATIN CALCIUM 5 MG/1
1 TABLET ORAL
Qty: 0 | Refills: 0 | DISCHARGE

## 2022-01-01 RX ORDER — METRONIDAZOLE 500 MG
TABLET ORAL
Refills: 0 | Status: DISCONTINUED | OUTPATIENT
Start: 2022-01-01 | End: 2022-01-01

## 2022-01-01 RX ORDER — COLLAGENASE CLOSTRIDIUM HIST. 250 UNIT/G
1 OINTMENT (GRAM) TOPICAL
Qty: 0 | Refills: 0 | DISCHARGE

## 2022-01-01 RX ORDER — CARBIDOPA AND LEVODOPA 25; 100 MG/1; MG/1
0.5 TABLET ORAL
Refills: 0 | Status: DISCONTINUED | OUTPATIENT
Start: 2022-01-01 | End: 2022-01-01

## 2022-01-01 RX ORDER — METFORMIN HYDROCHLORIDE 850 MG/1
2 TABLET ORAL
Qty: 0 | Refills: 0 | DISCHARGE

## 2022-01-01 RX ORDER — SODIUM CHLORIDE 9 MG/ML
10 INJECTION INTRAMUSCULAR; INTRAVENOUS; SUBCUTANEOUS
Refills: 0 | Status: DISCONTINUED | OUTPATIENT
Start: 2022-01-01 | End: 2022-01-01

## 2022-01-01 RX ORDER — SODIUM CHLORIDE 9 MG/ML
1000 INJECTION INTRAMUSCULAR; INTRAVENOUS; SUBCUTANEOUS
Refills: 0 | Status: DISCONTINUED | OUTPATIENT
Start: 2022-01-01 | End: 2022-01-01

## 2022-01-01 RX ORDER — LORAZEPAM 3 MG/1
3 CAPSULE, EXTENDED RELEASE ORAL
Qty: 3 | Refills: 0 | Status: ACTIVE | COMMUNITY
Start: 2022-01-01 | End: 1900-01-01

## 2022-01-01 RX ORDER — MEROPENEM 1 G/30ML
INJECTION INTRAVENOUS
Refills: 0 | Status: DISCONTINUED | OUTPATIENT
Start: 2022-01-01 | End: 2022-01-01

## 2022-01-01 RX ORDER — GLUCAGON INJECTION, SOLUTION 0.5 MG/.1ML
1 INJECTION, SOLUTION SUBCUTANEOUS ONCE
Refills: 0 | Status: DISCONTINUED | OUTPATIENT
Start: 2022-01-01 | End: 2022-01-01

## 2022-01-01 RX ORDER — AZITHROMYCIN 500 MG/1
500 TABLET, FILM COATED ORAL ONCE
Refills: 0 | Status: COMPLETED | OUTPATIENT
Start: 2022-01-01 | End: 2022-01-01

## 2022-01-01 RX ORDER — NYSTATIN 100000 1/G
100000 POWDER TOPICAL
Qty: 1 | Refills: 11 | Status: ACTIVE | COMMUNITY
Start: 2022-01-01 | End: 1900-01-01

## 2022-01-01 RX ORDER — SODIUM CHLORIDE 9 MG/ML
500 INJECTION INTRAMUSCULAR; INTRAVENOUS; SUBCUTANEOUS
Refills: 0 | Status: DISCONTINUED | OUTPATIENT
Start: 2022-01-01 | End: 2022-01-01

## 2022-01-01 RX ORDER — MULTIVIT-MIN/FERROUS GLUCONATE 9 MG/15 ML
1 LIQUID (ML) ORAL
Qty: 0 | Refills: 0 | DISCHARGE

## 2022-01-01 RX ORDER — METFORMIN HYDROCHLORIDE 850 MG/1
1 TABLET ORAL
Qty: 0 | Refills: 0 | DISCHARGE

## 2022-01-01 RX ORDER — HYDROCORTISONE 25 MG/G
2.5 CREAM TOPICAL
Qty: 1 | Refills: 2 | Status: ACTIVE | COMMUNITY
Start: 2022-01-01 | End: 1900-01-01

## 2022-01-01 RX ORDER — HYDROMORPHONE HYDROCHLORIDE 2 MG/ML
1 INJECTION INTRAMUSCULAR; INTRAVENOUS; SUBCUTANEOUS
Refills: 0 | Status: DISCONTINUED | OUTPATIENT
Start: 2022-01-01 | End: 2022-01-01

## 2022-01-01 RX ORDER — NIFEDIPINE 30 MG
1 TABLET, EXTENDED RELEASE 24 HR ORAL
Qty: 0 | Refills: 0 | DISCHARGE

## 2022-01-01 RX ORDER — PAROXETINE HYDROCHLORIDE 10 MG/1
10 TABLET, FILM COATED ORAL DAILY
Refills: 0 | Status: DISCONTINUED | COMMUNITY
End: 2022-01-01

## 2022-01-01 RX ORDER — ACETAMINOPHEN 500 MG
650 TABLET ORAL EVERY 6 HOURS
Refills: 0 | Status: DISCONTINUED | OUTPATIENT
Start: 2022-01-01 | End: 2022-01-01

## 2022-01-01 RX ORDER — CEFTRIAXONE 500 MG/1
1000 INJECTION, POWDER, FOR SOLUTION INTRAMUSCULAR; INTRAVENOUS ONCE
Refills: 0 | Status: COMPLETED | OUTPATIENT
Start: 2022-01-01 | End: 2022-01-01

## 2022-01-01 RX ORDER — DOXEPIN HYDROCHLORIDE 10 MG/1
10 CAPSULE ORAL DAILY
Qty: 60 | Refills: 1 | Status: ACTIVE | COMMUNITY
Start: 2022-01-01 | End: 1900-01-01

## 2022-01-01 RX ORDER — SITAGLIPTIN 50 MG/1
1 TABLET, FILM COATED ORAL
Qty: 0 | Refills: 0 | DISCHARGE

## 2022-01-01 RX ORDER — MEROPENEM 1 G/30ML
1000 INJECTION INTRAVENOUS ONCE
Refills: 0 | Status: COMPLETED | OUTPATIENT
Start: 2022-01-01 | End: 2022-01-01

## 2022-01-01 RX ORDER — PREGABALIN 225 MG/1
1 CAPSULE ORAL
Qty: 0 | Refills: 0 | DISCHARGE

## 2022-01-01 RX ADMIN — CEFTRIAXONE 100 MILLIGRAM(S): 500 INJECTION, POWDER, FOR SOLUTION INTRAMUSCULAR; INTRAVENOUS at 16:38

## 2022-01-01 RX ADMIN — SODIUM CHLORIDE 60 MILLILITER(S): 9 INJECTION, SOLUTION INTRAVENOUS at 07:40

## 2022-01-01 RX ADMIN — AZITHROMYCIN 500 MILLIGRAM(S): 500 TABLET, FILM COATED ORAL at 17:43

## 2022-01-01 RX ADMIN — POTASSIUM PHOSPHATE, MONOBASIC POTASSIUM PHOSPHATE, DIBASIC 62.5 MILLIMOLE(S): 236; 224 INJECTION, SOLUTION INTRAVENOUS at 10:38

## 2022-01-01 RX ADMIN — Medication 100 MILLIGRAM(S): at 07:11

## 2022-01-01 RX ADMIN — CARBIDOPA AND LEVODOPA 0.5 TABLET(S): 25; 100 TABLET ORAL at 19:08

## 2022-01-01 RX ADMIN — Medication 1: at 12:34

## 2022-01-01 RX ADMIN — Medication 40 MILLIEQUIVALENT(S): at 13:11

## 2022-01-01 RX ADMIN — LISINOPRIL 40 MILLIGRAM(S): 2.5 TABLET ORAL at 06:28

## 2022-01-01 RX ADMIN — SODIUM CHLORIDE 1000 MILLILITER(S): 9 INJECTION, SOLUTION INTRAVENOUS at 05:20

## 2022-01-01 RX ADMIN — ATORVASTATIN CALCIUM 40 MILLIGRAM(S): 80 TABLET, FILM COATED ORAL at 22:53

## 2022-01-01 RX ADMIN — Medication 1: at 08:41

## 2022-01-01 RX ADMIN — Medication 100 MILLIGRAM(S): at 21:56

## 2022-01-01 RX ADMIN — Medication 100 MILLIGRAM(S): at 22:30

## 2022-01-01 RX ADMIN — Medication 400 MILLIGRAM(S): at 05:58

## 2022-01-01 RX ADMIN — HYDROMORPHONE HYDROCHLORIDE 0.5 MG/HR: 2 INJECTION INTRAMUSCULAR; INTRAVENOUS; SUBCUTANEOUS at 00:12

## 2022-01-01 RX ADMIN — Medication 2: at 17:31

## 2022-01-01 RX ADMIN — Medication 1 MILLIGRAM(S): at 13:18

## 2022-01-01 RX ADMIN — ATORVASTATIN CALCIUM 40 MILLIGRAM(S): 80 TABLET, FILM COATED ORAL at 22:23

## 2022-01-01 RX ADMIN — HYDROMORPHONE HYDROCHLORIDE 1 MILLIGRAM(S): 2 INJECTION INTRAMUSCULAR; INTRAVENOUS; SUBCUTANEOUS at 02:55

## 2022-01-01 RX ADMIN — CARBIDOPA AND LEVODOPA 0.5 TABLET(S): 25; 100 TABLET ORAL at 19:57

## 2022-01-01 RX ADMIN — CEFTRIAXONE 100 MILLIGRAM(S): 500 INJECTION, POWDER, FOR SOLUTION INTRAMUSCULAR; INTRAVENOUS at 00:40

## 2022-01-01 RX ADMIN — Medication 2: at 09:19

## 2022-01-01 RX ADMIN — Medication 1: at 12:47

## 2022-01-01 RX ADMIN — AZITHROMYCIN 255 MILLIGRAM(S): 500 TABLET, FILM COATED ORAL at 18:25

## 2022-01-01 RX ADMIN — ROBINUL 0.4 MILLIGRAM(S): 0.2 INJECTION INTRAMUSCULAR; INTRAVENOUS at 14:27

## 2022-01-01 RX ADMIN — ROBINUL 0.4 MILLIGRAM(S): 0.2 INJECTION INTRAMUSCULAR; INTRAVENOUS at 13:46

## 2022-01-01 RX ADMIN — CARBIDOPA AND LEVODOPA 0.5 TABLET(S): 25; 100 TABLET ORAL at 12:40

## 2022-01-01 RX ADMIN — SODIUM CHLORIDE 100 MILLILITER(S): 9 INJECTION, SOLUTION INTRAVENOUS at 13:20

## 2022-01-01 RX ADMIN — Medication 100 MILLIGRAM(S): at 13:39

## 2022-01-01 RX ADMIN — Medication 100 MILLIGRAM(S): at 14:05

## 2022-01-01 RX ADMIN — SODIUM CHLORIDE 100 MILLILITER(S): 9 INJECTION INTRAMUSCULAR; INTRAVENOUS; SUBCUTANEOUS at 02:07

## 2022-01-01 RX ADMIN — Medication 4: at 11:45

## 2022-01-01 RX ADMIN — SODIUM CHLORIDE 60 MILLILITER(S): 9 INJECTION, SOLUTION INTRAVENOUS at 22:24

## 2022-01-01 RX ADMIN — HYDROMORPHONE HYDROCHLORIDE 1 MILLIGRAM(S): 2 INJECTION INTRAMUSCULAR; INTRAVENOUS; SUBCUTANEOUS at 18:09

## 2022-01-01 RX ADMIN — HEPARIN SODIUM 5000 UNIT(S): 5000 INJECTION INTRAVENOUS; SUBCUTANEOUS at 06:15

## 2022-01-01 RX ADMIN — SODIUM CHLORIDE 1000 MILLILITER(S): 9 INJECTION INTRAMUSCULAR; INTRAVENOUS; SUBCUTANEOUS at 16:30

## 2022-01-01 RX ADMIN — SENNA PLUS 1 TABLET(S): 8.6 TABLET ORAL at 13:00

## 2022-01-01 RX ADMIN — Medication 400 MILLIGRAM(S): at 05:18

## 2022-01-01 RX ADMIN — Medication 1 MILLIGRAM(S): at 22:53

## 2022-01-01 RX ADMIN — Medication 81 MILLIGRAM(S): at 11:17

## 2022-01-01 RX ADMIN — Medication 100 MILLIGRAM(S): at 05:44

## 2022-01-01 RX ADMIN — Medication 1000 MILLIGRAM(S): at 06:00

## 2022-01-01 RX ADMIN — CARBIDOPA AND LEVODOPA 0.5 TABLET(S): 25; 100 TABLET ORAL at 06:44

## 2022-01-01 RX ADMIN — Medication 1: at 18:09

## 2022-01-01 RX ADMIN — HYDROMORPHONE HYDROCHLORIDE 1 MILLIGRAM(S): 2 INJECTION INTRAMUSCULAR; INTRAVENOUS; SUBCUTANEOUS at 06:38

## 2022-01-01 RX ADMIN — HYDROMORPHONE HYDROCHLORIDE 1 MILLIGRAM(S): 2 INJECTION INTRAMUSCULAR; INTRAVENOUS; SUBCUTANEOUS at 19:43

## 2022-01-01 RX ADMIN — Medication 1: at 13:58

## 2022-01-01 RX ADMIN — POTASSIUM PHOSPHATE, MONOBASIC POTASSIUM PHOSPHATE, DIBASIC 62.5 MILLIMOLE(S): 236; 224 INJECTION, SOLUTION INTRAVENOUS at 13:12

## 2022-01-01 RX ADMIN — SENNA PLUS 1 TABLET(S): 8.6 TABLET ORAL at 11:18

## 2022-01-01 RX ADMIN — Medication 100 MILLIGRAM(S): at 06:45

## 2022-01-01 RX ADMIN — HYDROMORPHONE HYDROCHLORIDE 1 MILLIGRAM(S): 2 INJECTION INTRAMUSCULAR; INTRAVENOUS; SUBCUTANEOUS at 17:52

## 2022-01-01 RX ADMIN — HYDROMORPHONE HYDROCHLORIDE 1 MILLIGRAM(S): 2 INJECTION INTRAMUSCULAR; INTRAVENOUS; SUBCUTANEOUS at 14:28

## 2022-01-01 RX ADMIN — HYDROMORPHONE HYDROCHLORIDE 0.5 MG/HR: 2 INJECTION INTRAMUSCULAR; INTRAVENOUS; SUBCUTANEOUS at 18:31

## 2022-01-01 RX ADMIN — CARBIDOPA AND LEVODOPA 0.5 TABLET(S): 25; 100 TABLET ORAL at 17:49

## 2022-01-01 RX ADMIN — Medication 1 MILLIGRAM(S): at 10:17

## 2022-01-01 RX ADMIN — HEPARIN SODIUM 5000 UNIT(S): 5000 INJECTION INTRAVENOUS; SUBCUTANEOUS at 06:00

## 2022-01-01 RX ADMIN — HYDROMORPHONE HYDROCHLORIDE 1 MILLIGRAM(S): 2 INJECTION INTRAMUSCULAR; INTRAVENOUS; SUBCUTANEOUS at 09:05

## 2022-01-01 RX ADMIN — HEPARIN SODIUM 5000 UNIT(S): 5000 INJECTION INTRAVENOUS; SUBCUTANEOUS at 07:11

## 2022-01-01 RX ADMIN — HEPARIN SODIUM 5000 UNIT(S): 5000 INJECTION INTRAVENOUS; SUBCUTANEOUS at 14:24

## 2022-01-01 RX ADMIN — SENNA PLUS 1 TABLET(S): 8.6 TABLET ORAL at 13:18

## 2022-01-01 RX ADMIN — Medication 40 MILLIEQUIVALENT(S): at 07:47

## 2022-01-01 RX ADMIN — Medication 81 MILLIGRAM(S): at 17:43

## 2022-01-01 RX ADMIN — CARBIDOPA AND LEVODOPA 0.5 TABLET(S): 25; 100 TABLET ORAL at 23:20

## 2022-01-01 RX ADMIN — HYDROMORPHONE HYDROCHLORIDE 1 MILLIGRAM(S): 2 INJECTION INTRAMUSCULAR; INTRAVENOUS; SUBCUTANEOUS at 08:51

## 2022-01-01 RX ADMIN — CARBIDOPA AND LEVODOPA 0.5 TABLET(S): 25; 100 TABLET ORAL at 12:47

## 2022-01-01 RX ADMIN — Medication 2: at 09:27

## 2022-01-01 RX ADMIN — PANTOPRAZOLE SODIUM 40 MILLIGRAM(S): 20 TABLET, DELAYED RELEASE ORAL at 10:18

## 2022-01-01 RX ADMIN — LISINOPRIL 40 MILLIGRAM(S): 2.5 TABLET ORAL at 18:18

## 2022-01-01 RX ADMIN — CEFTRIAXONE 100 MILLIGRAM(S): 500 INJECTION, POWDER, FOR SOLUTION INTRAMUSCULAR; INTRAVENOUS at 16:31

## 2022-01-01 RX ADMIN — Medication 1: at 17:48

## 2022-01-01 RX ADMIN — CEFTRIAXONE 100 MILLIGRAM(S): 500 INJECTION, POWDER, FOR SOLUTION INTRAMUSCULAR; INTRAVENOUS at 18:28

## 2022-01-01 RX ADMIN — CARBIDOPA AND LEVODOPA 0.5 TABLET(S): 25; 100 TABLET ORAL at 00:03

## 2022-01-01 RX ADMIN — HYDROMORPHONE HYDROCHLORIDE 1 MILLIGRAM(S): 2 INJECTION INTRAMUSCULAR; INTRAVENOUS; SUBCUTANEOUS at 05:51

## 2022-01-01 RX ADMIN — HEPARIN SODIUM 5000 UNIT(S): 5000 INJECTION INTRAVENOUS; SUBCUTANEOUS at 21:56

## 2022-01-01 RX ADMIN — CARBIDOPA AND LEVODOPA 0.5 TABLET(S): 25; 100 TABLET ORAL at 00:21

## 2022-01-01 RX ADMIN — ATORVASTATIN CALCIUM 40 MILLIGRAM(S): 80 TABLET, FILM COATED ORAL at 21:44

## 2022-01-01 RX ADMIN — CARBIDOPA AND LEVODOPA 0.5 TABLET(S): 25; 100 TABLET ORAL at 13:19

## 2022-01-01 RX ADMIN — Medication 3: at 07:33

## 2022-01-01 RX ADMIN — Medication 40 MILLIEQUIVALENT(S): at 12:40

## 2022-01-01 RX ADMIN — POLYETHYLENE GLYCOL 3350 17 GRAM(S): 17 POWDER, FOR SOLUTION ORAL at 11:18

## 2022-01-01 RX ADMIN — Medication 100 MILLIGRAM(S): at 15:46

## 2022-01-01 RX ADMIN — Medication 100 MILLIGRAM(S): at 06:28

## 2022-01-01 RX ADMIN — HEPARIN SODIUM 5000 UNIT(S): 5000 INJECTION INTRAVENOUS; SUBCUTANEOUS at 15:46

## 2022-01-01 RX ADMIN — Medication 1 MILLIGRAM(S): at 11:18

## 2022-01-01 RX ADMIN — HEPARIN SODIUM 5000 UNIT(S): 5000 INJECTION INTRAVENOUS; SUBCUTANEOUS at 06:28

## 2022-01-01 RX ADMIN — Medication 40 MILLIEQUIVALENT(S): at 18:14

## 2022-01-01 RX ADMIN — Medication 1: at 18:22

## 2022-01-01 RX ADMIN — HYDROMORPHONE HYDROCHLORIDE 1 MILLIGRAM(S): 2 INJECTION INTRAMUSCULAR; INTRAVENOUS; SUBCUTANEOUS at 10:47

## 2022-01-01 RX ADMIN — Medication 1: at 09:08

## 2022-01-01 RX ADMIN — Medication 100 MILLIGRAM(S): at 22:54

## 2022-01-01 RX ADMIN — Medication 100 MILLIEQUIVALENT(S): at 12:39

## 2022-01-01 RX ADMIN — CLOPIDOGREL BISULFATE 75 MILLIGRAM(S): 75 TABLET, FILM COATED ORAL at 12:59

## 2022-01-01 RX ADMIN — Medication 1 MILLIGRAM(S): at 22:57

## 2022-01-01 RX ADMIN — HEPARIN SODIUM 5000 UNIT(S): 5000 INJECTION INTRAVENOUS; SUBCUTANEOUS at 15:31

## 2022-01-01 RX ADMIN — Medication 100 MILLIGRAM(S): at 21:44

## 2022-01-01 RX ADMIN — CARBIDOPA AND LEVODOPA 0.5 TABLET(S): 25; 100 TABLET ORAL at 01:35

## 2022-01-01 RX ADMIN — Medication 100 MILLIGRAM(S): at 13:51

## 2022-01-01 RX ADMIN — HEPARIN SODIUM 5000 UNIT(S): 5000 INJECTION INTRAVENOUS; SUBCUTANEOUS at 14:05

## 2022-01-01 RX ADMIN — HYDROMORPHONE HYDROCHLORIDE 1 MILLIGRAM(S): 2 INJECTION INTRAMUSCULAR; INTRAVENOUS; SUBCUTANEOUS at 06:27

## 2022-01-01 RX ADMIN — ATORVASTATIN CALCIUM 40 MILLIGRAM(S): 80 TABLET, FILM COATED ORAL at 21:06

## 2022-01-01 RX ADMIN — HEPARIN SODIUM 5000 UNIT(S): 5000 INJECTION INTRAVENOUS; SUBCUTANEOUS at 22:53

## 2022-01-01 RX ADMIN — Medication 20 MILLIEQUIVALENT(S): at 13:51

## 2022-01-01 RX ADMIN — Medication 1 MILLIGRAM(S): at 09:15

## 2022-01-01 RX ADMIN — CARBIDOPA AND LEVODOPA 0.5 TABLET(S): 25; 100 TABLET ORAL at 06:28

## 2022-01-01 RX ADMIN — CHLORHEXIDINE GLUCONATE 15 MILLILITER(S): 213 SOLUTION TOPICAL at 07:29

## 2022-01-01 RX ADMIN — HYDROMORPHONE HYDROCHLORIDE 2.5 MG/HR: 2 INJECTION INTRAMUSCULAR; INTRAVENOUS; SUBCUTANEOUS at 12:24

## 2022-01-01 RX ADMIN — Medication 1 MILLIGRAM(S): at 21:05

## 2022-01-01 RX ADMIN — Medication 100 MILLIGRAM(S): at 15:30

## 2022-01-01 RX ADMIN — CARBIDOPA AND LEVODOPA 0.5 TABLET(S): 25; 100 TABLET ORAL at 13:10

## 2022-01-01 RX ADMIN — Medication 400 MILLIGRAM(S): at 23:00

## 2022-01-01 RX ADMIN — HYDROMORPHONE HYDROCHLORIDE 0.5 MG/HR: 2 INJECTION INTRAMUSCULAR; INTRAVENOUS; SUBCUTANEOUS at 13:41

## 2022-01-01 RX ADMIN — CARBIDOPA AND LEVODOPA 0.5 TABLET(S): 25; 100 TABLET ORAL at 05:43

## 2022-01-01 RX ADMIN — Medication 5.67 MICROGRAM(S)/KG/MIN: at 05:19

## 2022-01-01 RX ADMIN — ROBINUL 0.4 MILLIGRAM(S): 0.2 INJECTION INTRAMUSCULAR; INTRAVENOUS at 17:46

## 2022-01-01 RX ADMIN — SENNA PLUS 1 TABLET(S): 8.6 TABLET ORAL at 12:47

## 2022-01-01 RX ADMIN — CARBIDOPA AND LEVODOPA 0.5 TABLET(S): 25; 100 TABLET ORAL at 07:11

## 2022-01-01 RX ADMIN — ROBINUL 0.4 MILLIGRAM(S): 0.2 INJECTION INTRAMUSCULAR; INTRAVENOUS at 10:47

## 2022-01-01 RX ADMIN — Medication 81 MILLIGRAM(S): at 12:59

## 2022-01-01 RX ADMIN — HYDROMORPHONE HYDROCHLORIDE 1 MILLIGRAM(S): 2 INJECTION INTRAMUSCULAR; INTRAVENOUS; SUBCUTANEOUS at 13:01

## 2022-01-01 RX ADMIN — HYDROMORPHONE HYDROCHLORIDE 1 MILLIGRAM(S): 2 INJECTION INTRAMUSCULAR; INTRAVENOUS; SUBCUTANEOUS at 05:31

## 2022-01-01 RX ADMIN — HEPARIN SODIUM 5000 UNIT(S): 5000 INJECTION INTRAVENOUS; SUBCUTANEOUS at 06:44

## 2022-01-01 RX ADMIN — HYDROMORPHONE HYDROCHLORIDE 1 MILLIGRAM(S): 2 INJECTION INTRAMUSCULAR; INTRAVENOUS; SUBCUTANEOUS at 19:28

## 2022-01-01 RX ADMIN — CEFTRIAXONE 100 MILLIGRAM(S): 500 INJECTION, POWDER, FOR SOLUTION INTRAMUSCULAR; INTRAVENOUS at 00:17

## 2022-01-01 RX ADMIN — Medication 2 MILLIGRAM(S): at 21:37

## 2022-01-01 RX ADMIN — CEFTRIAXONE 100 MILLIGRAM(S): 500 INJECTION, POWDER, FOR SOLUTION INTRAMUSCULAR; INTRAVENOUS at 17:41

## 2022-01-01 RX ADMIN — POLYETHYLENE GLYCOL 3350 17 GRAM(S): 17 POWDER, FOR SOLUTION ORAL at 12:46

## 2022-01-01 RX ADMIN — Medication 1: at 12:24

## 2022-01-01 RX ADMIN — Medication 2: at 22:48

## 2022-01-01 RX ADMIN — POLYETHYLENE GLYCOL 3350 17 GRAM(S): 17 POWDER, FOR SOLUTION ORAL at 13:11

## 2022-01-01 RX ADMIN — Medication 100 MILLIGRAM(S): at 21:05

## 2022-01-01 RX ADMIN — Medication 1: at 19:30

## 2022-01-01 RX ADMIN — Medication 1: at 22:53

## 2022-01-01 RX ADMIN — Medication 100 MILLIGRAM(S): at 22:23

## 2022-01-01 RX ADMIN — CEFTRIAXONE 100 MILLIGRAM(S): 500 INJECTION, POWDER, FOR SOLUTION INTRAMUSCULAR; INTRAVENOUS at 00:21

## 2022-01-01 RX ADMIN — Medication 4 MILLILITER(S): at 04:43

## 2022-01-01 RX ADMIN — CARBIDOPA AND LEVODOPA 0.5 TABLET(S): 25; 100 TABLET ORAL at 00:18

## 2022-01-01 RX ADMIN — CARBIDOPA AND LEVODOPA 0.5 TABLET(S): 25; 100 TABLET ORAL at 18:28

## 2022-01-01 RX ADMIN — CEFTRIAXONE 100 MILLIGRAM(S): 500 INJECTION, POWDER, FOR SOLUTION INTRAMUSCULAR; INTRAVENOUS at 17:11

## 2022-01-01 RX ADMIN — HYDROMORPHONE HYDROCHLORIDE 2.5 MG/HR: 2 INJECTION INTRAMUSCULAR; INTRAVENOUS; SUBCUTANEOUS at 13:26

## 2022-01-01 RX ADMIN — POLYETHYLENE GLYCOL 3350 17 GRAM(S): 17 POWDER, FOR SOLUTION ORAL at 09:14

## 2022-01-01 RX ADMIN — Medication 1 MILLIGRAM(S): at 12:46

## 2022-01-01 RX ADMIN — Medication 1 MILLIGRAM(S): at 21:44

## 2022-01-01 RX ADMIN — Medication 100 MILLIGRAM(S): at 14:24

## 2022-01-01 RX ADMIN — HYDROMORPHONE HYDROCHLORIDE 1 MILLIGRAM(S): 2 INJECTION INTRAMUSCULAR; INTRAVENOUS; SUBCUTANEOUS at 13:20

## 2022-01-01 RX ADMIN — Medication 100 MILLIEQUIVALENT(S): at 16:44

## 2022-01-01 RX ADMIN — SENNA PLUS 1 TABLET(S): 8.6 TABLET ORAL at 09:15

## 2022-01-01 RX ADMIN — Medication 650 MILLIGRAM(S): at 00:50

## 2022-01-01 RX ADMIN — CARBIDOPA AND LEVODOPA 0.5 TABLET(S): 25; 100 TABLET ORAL at 18:09

## 2022-01-01 RX ADMIN — Medication 100 MILLIEQUIVALENT(S): at 19:57

## 2022-01-01 RX ADMIN — HYDROMORPHONE HYDROCHLORIDE 1 MILLIGRAM(S): 2 INJECTION INTRAMUSCULAR; INTRAVENOUS; SUBCUTANEOUS at 18:30

## 2022-01-01 RX ADMIN — HYDROMORPHONE HYDROCHLORIDE 2.5 MG/HR: 2 INJECTION INTRAMUSCULAR; INTRAVENOUS; SUBCUTANEOUS at 18:11

## 2022-01-01 RX ADMIN — Medication 1 MILLIGRAM(S): at 21:55

## 2022-01-01 RX ADMIN — SODIUM CHLORIDE 50 MILLILITER(S): 9 INJECTION, SOLUTION INTRAVENOUS at 09:18

## 2022-01-01 RX ADMIN — HYDROMORPHONE HYDROCHLORIDE 1 MILLIGRAM(S): 2 INJECTION INTRAMUSCULAR; INTRAVENOUS; SUBCUTANEOUS at 14:45

## 2022-01-01 RX ADMIN — Medication 1 MILLIGRAM(S): at 22:23

## 2022-01-01 RX ADMIN — HYDROMORPHONE HYDROCHLORIDE 1 MILLIGRAM(S): 2 INJECTION INTRAMUSCULAR; INTRAVENOUS; SUBCUTANEOUS at 02:13

## 2022-01-01 RX ADMIN — CLOPIDOGREL BISULFATE 75 MILLIGRAM(S): 75 TABLET, FILM COATED ORAL at 17:43

## 2022-01-01 RX ADMIN — Medication 1: at 22:29

## 2022-01-01 RX ADMIN — HEPARIN SODIUM 5000 UNIT(S): 5000 INJECTION INTRAVENOUS; SUBCUTANEOUS at 22:23

## 2022-01-01 RX ADMIN — Medication 81 MILLIGRAM(S): at 12:47

## 2022-01-01 RX ADMIN — POLYETHYLENE GLYCOL 3350 17 GRAM(S): 17 POWDER, FOR SOLUTION ORAL at 12:59

## 2022-01-01 RX ADMIN — Medication 100 MILLIGRAM(S): at 06:14

## 2022-01-01 RX ADMIN — CLOPIDOGREL BISULFATE 75 MILLIGRAM(S): 75 TABLET, FILM COATED ORAL at 13:11

## 2022-01-01 RX ADMIN — HEPARIN SODIUM 5000 UNIT(S): 5000 INJECTION INTRAVENOUS; SUBCUTANEOUS at 05:43

## 2022-01-01 RX ADMIN — Medication 2: at 21:51

## 2022-01-01 RX ADMIN — HEPARIN SODIUM 5000 UNIT(S): 5000 INJECTION INTRAVENOUS; SUBCUTANEOUS at 22:30

## 2022-01-01 RX ADMIN — SODIUM CHLORIDE 60 MILLILITER(S): 9 INJECTION INTRAMUSCULAR; INTRAVENOUS; SUBCUTANEOUS at 07:47

## 2022-01-01 RX ADMIN — ROBINUL 0.4 MILLIGRAM(S): 0.2 INJECTION INTRAMUSCULAR; INTRAVENOUS at 02:10

## 2022-01-01 RX ADMIN — CARBIDOPA AND LEVODOPA 0.5 TABLET(S): 25; 100 TABLET ORAL at 00:17

## 2022-01-01 RX ADMIN — ATORVASTATIN CALCIUM 40 MILLIGRAM(S): 80 TABLET, FILM COATED ORAL at 22:59

## 2022-01-01 RX ADMIN — MEROPENEM 100 MILLIGRAM(S): 1 INJECTION INTRAVENOUS at 05:20

## 2022-01-01 RX ADMIN — Medication 100 MILLIGRAM(S): at 05:19

## 2022-01-01 RX ADMIN — CARBIDOPA AND LEVODOPA 0.5 TABLET(S): 25; 100 TABLET ORAL at 13:37

## 2022-01-01 RX ADMIN — HEPARIN SODIUM 5000 UNIT(S): 5000 INJECTION INTRAVENOUS; SUBCUTANEOUS at 13:39

## 2022-01-01 RX ADMIN — HEPARIN SODIUM 5000 UNIT(S): 5000 INJECTION INTRAVENOUS; SUBCUTANEOUS at 21:06

## 2022-01-01 RX ADMIN — PROPOFOL 3.63 MICROGRAM(S)/KG/MIN: 10 INJECTION, EMULSION INTRAVENOUS at 05:20

## 2022-01-01 RX ADMIN — Medication 1 MILLIGRAM(S): at 13:27

## 2022-01-01 RX ADMIN — HEPARIN SODIUM 5000 UNIT(S): 5000 INJECTION INTRAVENOUS; SUBCUTANEOUS at 21:43

## 2022-01-01 RX ADMIN — HYDROMORPHONE HYDROCHLORIDE 2.5 MG/HR: 2 INJECTION INTRAMUSCULAR; INTRAVENOUS; SUBCUTANEOUS at 23:50

## 2022-01-01 RX ADMIN — HYDROMORPHONE HYDROCHLORIDE 1 MILLIGRAM(S): 2 INJECTION INTRAMUSCULAR; INTRAVENOUS; SUBCUTANEOUS at 18:07

## 2022-01-01 RX ADMIN — CLOPIDOGREL BISULFATE 75 MILLIGRAM(S): 75 TABLET, FILM COATED ORAL at 11:18

## 2022-01-01 RX ADMIN — CARBIDOPA AND LEVODOPA 0.5 TABLET(S): 25; 100 TABLET ORAL at 18:13

## 2022-01-01 RX ADMIN — Medication 2.84 MICROGRAM(S)/KG/MIN: at 09:31

## 2022-01-01 RX ADMIN — Medication 1 MILLIGRAM(S): at 23:09

## 2022-01-01 RX ADMIN — CARBIDOPA AND LEVODOPA 0.5 TABLET(S): 25; 100 TABLET ORAL at 06:14

## 2022-01-01 RX ADMIN — Medication 81 MILLIGRAM(S): at 13:11

## 2022-01-01 RX ADMIN — ATORVASTATIN CALCIUM 40 MILLIGRAM(S): 80 TABLET, FILM COATED ORAL at 21:56

## 2022-01-01 RX ADMIN — HEPARIN SODIUM 5000 UNIT(S): 5000 INJECTION INTRAVENOUS; SUBCUTANEOUS at 13:27

## 2022-01-01 RX ADMIN — Medication 1: at 21:43

## 2022-01-03 PROBLEM — L08.9 SKIN INFECTION: Status: ACTIVE | Noted: 2022-01-01

## 2022-01-03 NOTE — HISTORY OF PRESENT ILLNESS
[FreeTextEntry1] : FU cyst [de-identified] : established patient with parkinsons\par seen dec 16 for inflamed cyst\par given doxy, ILK 10\par no improvement\par just started cephalexin on thursday, no change\par no pain, fever, chills\par +yellow drainage

## 2022-01-03 NOTE — PHYSICAL EXAM
[Alert] : alert [Oriented x 3] : ~L oriented x 3 [Well Nourished] : well nourished [Conjunctiva Non-injected] : conjunctiva non-injected [No Visual Lymphadenopathy] : no visual  lymphadenopathy [No Clubbing] : no clubbing [No Edema] : no edema [No Bromhidrosis] : no bromhidrosis [No Chromhidrosis] : no chromhidrosis [FreeTextEntry3] : w/c bound\par upper back several cm subcutaneous nodule with surrounding erythema, numerous 1 mm holes with purulent drainage\par somewhat tender but able to apply significant pressure

## 2022-02-10 PROBLEM — L72.3 INFLAMED EPIDERMOID CYST OF SKIN: Status: ACTIVE | Noted: 2021-01-01

## 2022-02-10 PROBLEM — L30.4 INTERTRIGO: Status: ACTIVE | Noted: 2022-01-01

## 2022-02-10 PROBLEM — L98.9 SKIN EROSION: Status: ACTIVE | Noted: 2022-01-01

## 2022-02-10 NOTE — PHYSICAL EXAM
[Alert] : alert [Oriented x 3] : ~L oriented x 3 [Well Nourished] : well nourished [Conjunctiva Non-injected] : conjunctiva non-injected [No Visual Lymphadenopathy] : no visual  lymphadenopathy [No Clubbing] : no clubbing [No Edema] : no edema [No Bromhidrosis] : no bromhidrosis [No Chromhidrosis] : no chromhidrosis [FreeTextEntry3] : w/c bound\par upper back several cm telangiectatic flat nodule with punctum, no contents on gentle pressue\par left lateral ankle with crusted erosion\par erythematous plaques with white cream bilateral inguinal folds

## 2022-03-09 NOTE — ED PROVIDER NOTE - NSICDXPASTMEDICALHX_GEN_ALL_CORE_FT
PAST MEDICAL HISTORY:  Depression     Depression     DM2 (diabetes mellitus, type 2) since 5/16/2017    Hyperlipidemia     Hypertension     Melanoma     Parkinsons     Tinnitus of left ear     Vertigo

## 2022-03-09 NOTE — ED PROVIDER NOTE - OBJECTIVE STATEMENT
82 yo M with PMHx of parkinson's dz, on carbidopa/levodopa 25/250mg 1/2 tab QID (increased since 10/2021), ativan PRN, NIDDM, HTN, HLD, depression, anxiety, presenting c/o worsening tremors, flushing, anxiety x 1 wk. Pt reports having baseline tremors due to parkinson's but noted worsening generalized tremors, intermittent flushing sensation with diaphoresis, and feeling extremely anxious during these episodes. Psychiatrist has increased dose of ativan 1mg up to 6 times daily to counter the effects but noted worsening sx this week.  Had a virtue consult with his neurologist from [place] today and informed to come in to ER for further evaluation.  Wife noted that during these episodes, pt would get extremely hypertensive with BP to 170/100, but subsided after sx improved.  Denies AMS, vertigo, dizziness, numbness, tingling, photophobia, diplopia, change in vision/hearing/gait/mental status/speech, focal weakness, neck pain, rash, fever, chills, stiffness, CP, SOB, palpitations, diaphoresis, N/V/D/C, abdominal pain, change in urinary/bowel function, dysuria, hematuria, flank pain, and malaise.

## 2022-03-09 NOTE — ED PROVIDER NOTE - CARE PROVIDER_API CALL
your psychiatrist,   Phone: (   )    -  Fax: (   )    -  Follow Up Time:     your neurologist,   Phone: (   )    -  Fax: (   )    -  Follow Up Time:

## 2022-03-09 NOTE — ED PROVIDER NOTE - NSFOLLOWUPINSTRUCTIONS_ED_ALL_ED_FT
Serotonin Syndrome      Serotonin is a chemical in your body (neurotransmitter) that helps to control several functions, such as:  •Brain and nerve cell function.      •Mood and emotions.      •Memory.      •Eating.      •Sleeping.      •Sexual activity.      •Stress response.      Having too much serotonin in your body can cause serotonin syndrome. This condition can be harmful to your brain and nerve cells. This can be a life-threatening condition.      What are the causes?    This condition may be caused by taking medicines or drugs that increase the level of serotonin in your body, such as:  •Antidepressant medicines.      •Migraine medicines.      •Certain pain medicines.      •Certain drugs, including ecstasy, LSD, cocaine, and amphetamines.      •Over-the-counter cough or cold medicines that contain dextromethorphan.      •Certain herbal supplements, including Shilpi's wort, ginseng, and nutmeg.      This condition usually occurs when you take these medicines or drugs in combination, but it can also happen with a high dose of a single medicine or drug.      What increases the risk?    You are more likely to develop this condition if:  •You just started taking a medicine or drug that increases the level of serotonin in the body.      •You recently increased the dose of a medicine or drug that increases the level of serotonin in the body.      •You take more than one medicine or drug that increases the level of serotonin in the body.        What are the signs or symptoms?    Symptoms of this condition usually start within several hours of taking a medicine or drug. Symptoms may be mild or severe. Mild symptoms include:  •Sweating.      •Restlessness or agitation.      •Muscle twitching or stiffness.      •Rapid heart rate.      •Nausea and vomiting.      •Diarrhea.      •Headache.      •Shivering or goose bumps.      •Confusion.      Severe symptoms include:  •Irregular heartbeat.      •Seizures.      •Loss of consciousness.      •High fever.        How is this diagnosed?    This condition may be diagnosed based on:  •Your medical history.       •A physical exam.      •Your prior use of drugs and medicines.      •Blood or urine tests. These may be used to rule out other causes of your symptoms.        How is this treated?    The treatment for this condition depends on the severity of your symptoms.  •For mild cases, stopping the medicine or drug that caused your condition is usually all that is needed.       •For moderate to severe cases, treatment in a hospital may be needed to prevent or manage life-threatening symptoms. This may include medicines to control your symptoms, IV fluids, interventions to support your breathing, and treatments to control your body temperature.        Follow these instructions at home:      Medicines      •Take over-the-counter and prescription medicines only as told by your health care provider. This is important.      •Check with your health care provider before you start taking any new prescriptions, over-the-counter medicines, herbs, or supplements.      •Avoid combining any medicines that can cause this condition to occur.        Lifestyle    •Maintain a healthy lifestyle.  •Eat a healthy diet that includes plenty of vegetables, fruits, whole grains, low-fat dairy products, and lean protein. Do not eat a lot of foods that are high in fat, added sugars, or salt.      •Get the right amount and quality of sleep. Most adults need 7–9 hours of sleep each night.      •Make time to exercise, even if it is only for short periods of time. Most adults should exercise for at least 150 minutes each week.      •Do not drink alcohol.      •Do not use illegal drugs, and do not take medicines for reasons other than they are prescribed.        General instructions     • Do not use any products that contain nicotine or tobacco, such as cigarettes and e-cigarettes. If you need help quitting, ask your health care provider.      •Keep all follow-up visits as told by your health care provider. This is important.        Contact a health care provider if:    •Your symptoms do not improve or they get worse.        Get help right away if you:    •Have worsening confusion, severe headache, chest pain, high fever, seizures, or loss of consciousness.      •Experience serious side effects of medicine, such as swelling of your face, lips, tongue, or throat.      •Have serious thoughts about hurting yourself or others.      These symptoms may represent a serious problem that is an emergency. Do not wait to see if the symptoms will go away. Get medical help right away. Call your local emergency services (887 in the [place] Do not drive yourself to the hospital.     If you ever feel like you may hurt yourself or others, or have thoughts about taking your own life, get help right away. You can go to your nearest emergency department or call:   • Your local emergency services (322 in the [place]       •·A suicide crisis helpline, such as the National Suicide Prevention Lifeline at 9-065-413-3565. This is open 24 hours a day.        Summary    •Serotonin is a brain chemical that helps to regulate the nervous system. High levels of serotonin in the body can cause serotonin syndrome, which is a very dangerous condition.      •This condition may be caused by taking medicines or drugs that increase the level of serotonin in your body.      •Treatment depends on the severity of your symptoms. For mild cases, stopping the medicine or drug that caused your condition is usually all that is needed.      •Check with your health care provider before you start taking any new prescriptions, over-the-counter medicines, herbs, or supplements.

## 2022-03-09 NOTE — ED ADULT NURSE NOTE - NSIMPLEMENTINTERV_GEN_ALL_ED
Implemented All Fall with Harm Risk Interventions:  [place] to call system. Call bell, personal items and telephone within reach. Instruct patient to call for assistance. Room bathroom lighting operational. Non-slip footwear when patient is off stretcher. Physically safe environment: no spills, clutter or unnecessary equipment. Stretcher in lowest position, wheels locked, appropriate side rails in place. Provide visual cue, wrist band, yellow gown, etc. Monitor gait and stability. Monitor for mental status changes and reorient to person, place, and time. Review medications for side effects contributing to fall risk. Reinforce activity limits and safety measures with patient and family. Provide visual clues: red socks.

## 2022-03-09 NOTE — ED PROVIDER NOTE - PHYSICAL EXAMINATION
Vital Signs - nursing notes reviewed and confirmed  Gen - WDWN M, NAD, comfortable and non-toxic appearing, speaking in full sentences   Skin - warm, dry, intact  HEENT - AT/NC, PERRL, EOMI, no conjunctival injection, moist oral mucosa, TM intact b/l with good cone of lights, o/p clear with no erythema, edema, or exudate, uvula midline, airway patent, neck supple and NT, FROM  CV - S1S2, R/R/R  Resp - respiration non-labored, CTAB, symmetric bs b/l, no r/r/w  GI - NABS, soft, ND, NT, no rebound or guarding, no CVAT b/l   MS - w/w/p, no c/c/e, calves supple and NT, distal pulses symmetric b/l, brisk cap refills, +SILT, NV intact, FROM, compartment soft  Neuro - AxOx3, no focal neuro deficits, CN II-XII grossly intact, cerebellar function intact, negative pronator drift, negative nystagmus, ambulatory without gait disturbance Vital Signs - nursing notes reviewed and confirmed  Gen - WDWN M, slightly anxious appearing, comfortable and non-toxic appearing, speaking in full sentences   Skin - warm, dry, intact  HEENT - AT/NC, PERRL, EOMI, no conjunctival injection, moist oral mucosa, TM intact b/l with good cone of lights, o/p clear with no erythema, edema, or exudate, uvula midline, airway patent, neck supple and NT, FROM  CV - S1S2, rapid rate, irregular beats, no m/r/g   Resp - respiration non-labored, CTAB, symmetric bs b/l, no r/r/w  GI - NABS, soft, ND, NT, no rebound or guarding, no CVAT b/l   MS - w/w/p, no c/c/e, calves supple and NT, distal pulses symmetric b/l, brisk cap refills, +SILT, NV intact, FROM, compartment soft  Neuro - AxOx3, no focal neuro deficits, CN II-XII grossly intact, negative nystagmus, +essential trmeors b/l, cart wheeling gait noted

## 2022-03-09 NOTE — ED PROVIDER NOTE - PROGRESS NOTE DETAILS
home meds - carbidopa/levodopa 25/250mg 1/2 tab QID, ativan 1mg PRN (~ 3.5mg daily), mirtazapine 7.5mg QHS, paroxetine 40mg daily Neurologist - Dr. Shawna Hwang [phone]  Cardiologist - Dr. Dylon Preciado [phone] received call back from pt's neurologist from [place] - Dr. Hwang, results and pt's condition discussed, no acute neurological abnormalities noted on exam and during w/u. Sx likely 2/2 polypharmacy with suspicion of serotonin syndrome (increased risk w increased dose of carbadopa and SSRI/mirtazapine, monitored in the ED with stable VS, instructed prompt f/u with psychiatrist for outpt medication adjustment. pt otherwise has appt with neurologist next wk

## 2022-03-09 NOTE — ED ADULT NURSE NOTE - OBJECTIVE STATEMENT
81y male presents to ED c/o incr tremors and worsening gait. As per family member, pt has hx of parkinsons and notes over the last week or two has had "hot flashes" and more difficulty ambulating with walker. Pt denies cp or sob.

## 2022-03-09 NOTE — ED PROVIDER NOTE - CLINICAL SUMMARY MEDICAL DECISION MAKING FREE TEXT BOX
pt p/w worsening tremors, anxiety, flushing, hot flashes, diaphoresis x 1 wk, exam unremarkable except for essential tremors and cart wheeling gait, labs and CTH unremarkable, home meds reviewed and sx highly suggestive serotonin syndrome given polypharmacy and increased in carbidopa w SSRIs.  received call back from pt's neurologist from [place] - Dr. Hwang, results and pt's condition discussed, no acute neurological abnormalities noted on exam and during w/u. Sx likely 2/2 polypharmacy with suspicion of serotonin syndrome (increased risk w increased dose of carbidopa and SSRI/mirtazapine/paxil, monitored in the ED with stable VS, instructed prompt f/u with psychiatrist for outpt medication adjustment. Will stop all SSRI currently and take ativan PRN (which pt and wife have enough supplies and instructions and precautions reviewed extensively with family at bedside), pt otherwise has appt with neurologist next wk and feels markedly improved and safe to go home, medically and psychiatrically stable for dc

## 2022-03-09 NOTE — ED PROVIDER NOTE - PROVIDER TOKENS
FREE:[LAST:[your psychiatrist],PHONE:[(   )    -],FAX:[(   )    -]],FREE:[LAST:[your neurologist],PHONE:[(   )    -],FAX:[(   )    -]]

## 2022-03-10 NOTE — ED ADULT NURSE REASSESSMENT NOTE - NS ED NURSE REASSESS COMMENT FT1
pt endorses "hot flashes", PA notified.
Pt denies headache, denies SOB, denies chest pain. Pt reports he wants to go home.
Pt received from previous shift RN A&Ox4, spon resps on RA unlabored, NAD.  Pt waiting for transportation home, will continue to monitor.

## 2022-07-20 NOTE — ED ADULT NURSE NOTE - NSIMPLEMENTINTERV_GEN_ALL_ED
None
Implemented All Universal Safety Interventions:  [place] to call system. Call bell, personal items and telephone within reach. Instruct patient to call for assistance. Room bathroom lighting operational. Non-slip footwear when patient is off stretcher. Physically safe environment: no spills, clutter or unnecessary equipment. Stretcher in lowest position, wheels locked, appropriate side rails in place.

## 2022-08-02 PROBLEM — F41.0 PANIC ATTACKS: Status: ACTIVE | Noted: 2021-01-01

## 2022-08-02 PROBLEM — G47.00 INSOMNIA: Status: ACTIVE | Noted: 2021-02-04

## 2022-08-04 NOTE — ED ADULT TRIAGE NOTE - CHIEF COMPLAINT QUOTE
pt presents with weakness and hypotension while at home per aid. EMS gave bolus of 450ml NS with improvement. facial droop noted in triage, per aid, patient developed droop around 0700 this morning.

## 2022-08-04 NOTE — ED ADULT NURSE NOTE - TEMPLATE
[FreeTextEntry6] : RUNNY NOSE AND COUGH X 1 DAY\par NO FEVERS\par NO CHANGE IN APPETITE OR ACTIVITY\par \par SIB WITH ALLERGIES VS URI\par NO COVID EXPOSURES BUT OLDER SIBLING IS IN SCHOOL Neuro

## 2022-08-04 NOTE — ED ADULT NURSE NOTE - OBJECTIVE STATEMENT
Patient with h/o parkinson's and DM was brought in by A with EMS for generalized weakness, unable to stand up, found to be hypotensive at home (SBP 70s) by HHA, and slurred speech. As per HHA when he came onto shift around 7 am , patient appears pale to him and has difficulty speaking. HHA stated that it takes longer for patient to get words out and speech is slurred. Patient unable to get out of bed or stand. HHA checked BP and it was SBP 70s which prompted him to call 911. Patient is alert and oriented to name only. left facial droop observed.  Stroke code activated at 1500. F/S 291. Patient brought to CT. Patient with h/o parkinson's and DM was brought in by A with EMS for generalized weakness, unable to stand up, found to be hypotensive at home (SBP 70s), and slurred speech. As per HHA when he came onto shift around 7 am , patient appears pale to him and had difficulty speaking. HHA stated that it took longer for patient to get words out and speech is slurred. Patient unable to get out of bed or stand. HHA checked BP and it was SBP 70s which prompted him to call 911. Patient is alert and oriented to name only in the ER; left facial droop observed. Stroke code activated at 1500. F/S 291. Patient brought to CT and returned to room in stable condition. /78. Patient complains of right shoulder pain during assessment. Denies any falls. Connected to cardiac monitoring.

## 2022-08-04 NOTE — ED ADULT NURSE REASSESSMENT NOTE - GENERAL PATIENT STATE
comfortable appearance/family/SO at bedside/resting/sleeping Residential stability/Relationship stability/Employment stability/Sobriety

## 2022-08-04 NOTE — ED PROVIDER NOTE - CLINICAL SUMMARY MEDICAL DECISION MAKING FREE TEXT BOX
pt presents with c/o slurred speech, generalized weakness, and "change in face" as per HHA. code stroke initiated but after further  history taking, pt actually presenting with hypotension, generalized weakness, fatigue, dry mouth, and palor. telestroke determines this is not likely a stroke but rather dehydration, possible infection. elevated WBC noted with WILL. CXR shows CAP. abx, tylenol, and fluids given although pt is afebrile. will admit.

## 2022-08-04 NOTE — ED ADULT NURSE NOTE - NSIMPLEMENTINTERV_GEN_ALL_ED
Implemented All Fall with Harm Risk Interventions:  [place] to call system. Call bell, personal items and telephone within reach. Instruct patient to call for assistance. Room bathroom lighting operational. Non-slip footwear when patient is off stretcher. Physically safe environment: no spills, clutter or unnecessary equipment. Stretcher in lowest position, wheels locked, appropriate side rails in place. Provide visual cue, wrist band, yellow gown, etc. Monitor gait and stability. Monitor for mental status changes and reorient to person, place, and time. Review medications for side effects contributing to fall risk. Reinforce activity limits and safety measures with patient and family. Provide visual clues: red socks.

## 2022-08-04 NOTE — ED ADULT NURSE REASSESSMENT NOTE - NS ED NURSE REASSESS COMMENT FT1
Assumed pt from rn leonard, patient resting in bed with HHA at bedside. breathing is even and unlabored.  waiting for transport.

## 2022-08-04 NOTE — ED PROVIDER NOTE - PRIOR EKG STATUS
no discharge, no irritation, no pain, no redness, and no visual changes. there is no prior EKG available for comparison

## 2022-08-04 NOTE — CHART NOTE - NSCHARTNOTEFT_GEN_A_CORE
TELESTROKE NOTE - STROKE FELLOW    81 year old Male w/ PMH of Parkinson's Disease (on Sinemet), NIDDM, HTN, HLD, Depression, Anxiety on ativan prn, presents w/ generalized weakness and hypotension; Telstroke code called given reported L. facial droop.     I examined the patient via TELED[employer] video cart;     CTH (to my eye): No acute pathology; Age appropriate atrophy.   CTA h/n (to my eye):   CTP (to my eye):     Impression: TELESTROKE NOTE - STROKE FELLOW    81 year old Male w/ PMH of Parkinson's Disease (on Sinemet), NIDDM, HTN, HLD, Depression, Anxiety on ativan prn, presents w/ generalized weakness and hypotension; Telstroke code called given reported L. facial droop.     I examined the patient via TELED[employer] video cart; I asked the patient why he came to the hospital and he states "I had a stroke". Unable to provider further history. Further history obtained from family at bedside. Per family, patient was weak throughout this morning and so they checked his BP and it was 70s systolic. Family member later noted that "his face was pale" but NO facial droop was endorsed. Patient brought to the ER for these complaints. /78. NIHSS 0 (via TELAD[employer] video).     EXAM  Fluent; Follows simple commands  VFF; EOMI; No facial assymetry  No drifts x4  No dysmetria on FNF; Resting tremor R>L     CTH (to my eye): No acute pathology; Age appropriate atrophy.   CTA h/n (to my eye): no acute LVO; Moderate stenosis of the proximal R. ICA; Mild stenosis of the proximal L. ICA.   CTP (to my eye): Core 0. Tmax >6s delay in the R. temporal and Occipital region.     Impression: Transient L. facial droop consistent w/ R. 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     Plan:   [] Continue ASA 81mg; Start Plavix 75mg for 3 weeks followed by ASA 81mg indefinitely per CHANCE trial  [] Continue statin   [] No need for transfer  [] f/u with vascular neurology [facility] at [phone] on  Monday 8/8/22    Case discussed w/ Vascular Neurology attending Dr. Jane Saravia

## 2022-08-04 NOTE — ED PROVIDER NOTE - CARE PLAN
Principal Discharge DX:	Community acquired pneumonia  Secondary Diagnosis:	WILL (acute kidney injury)  Secondary Diagnosis:	Other specified sepsis   1

## 2022-08-04 NOTE — ED PROVIDER NOTE - NS ED ATTENDING STATEMENT MOD
This was a shared visit with the [facility]. I reviewed and verified the documentation and independently performed the documented:

## 2022-08-04 NOTE — ED PROVIDER NOTE - OBJECTIVE STATEMENT
83yo M with h/o Parkinsons, HTN, DM, HLD presents with c/o generalized weakness. initially HHA reported slurred speech and "change in his face" along with hypotension to SBP 80s at home, which persisted after AM medications (possibly took HTN meds but unclear). pt initially activated as a code stroke but after more extensive interviewing including a telestroke visit with Dr. Rawls it was determined that this is not a stroke presentation but rather a hypotension concern. CHANDRAKANTA more clearly describes that his face is not newly assymetric but that the change he was intending to point out was palor and the speech changes were related to his mouth being dry. pt has no reported focal deficits including cp, sob, ha, cough, fever, chills, nausea, vomiting, numbness, focal weakness, speech changes, urinary symptoms. pt's also c/o right shoulder pain, atraumatic. pt wearing 2L per NC but does not wear O2 at home.       [facility] [phone]

## 2022-08-05 NOTE — CONSULT NOTE ADULT - ASSESSMENT
per Internal Medicine    82 y o M with h/o Parkinsons, HTN, DM, HLD presents after the home health aid called EMS. The HHA at bedside states he called EMS because the patient looked extremely pale and hypotensive admitted for severe sepsis.     Problem/Plan - 1:  ·  Problem: Severe sepsis.   ·  Plan: patient with severe sepsis on presentation hypotensive, tachycardic, leukocytosis, tachypnea and positive lacate  given 1 L NS in ED   possible source - pna  patient with secretions on exam - likely aspirating. no focal complaints besides coughing   febrile to 101 rectally on arrival  CXR with poor inspiratory effort  hx of being limited to bed and not too mobile  -f/u CTPE - tachycardic tachypneic and febrile with hx of being in bed most of the time  -f/u CT scan view of lungs   -c/w ctx azithro  -f/u S+S eval  -suctioning prn  -f/u bcx ucx  -consider serotonin syndrome in setting of psych medications.    Problem/Plan - 2:  ·  Problem: Metabolic encephalopathy.   ·  Plan: patient with some confusion from baseline per HHA  likely 2/2 infection  stroke code called at [facility] - per telestroke doctor was negative however imaging showed: Mild to moderate stenosis of the bilateral intracranial vertebral arteries (left greater than right).Moderate stenosis the proximal right ICA. Mild stenosis the proximal left ICA.  patient takes ativan q4 at home per HHA however does not know dose  due to level of secretions will hold off on further ativan now. no signs of withdrawal at this time - tremoring seen pill rolling   -wa protocol   -consult stroke service in AM to follow for recs  -c/w above plan  -obtain med rec  -recommend neuro consult.    Problem/Plan - 3:  ·  Problem: Pneumonia, aspiration.   ·  Plan: patient appears to have possibly aspirated  has secretions on exam   -c/w suctioning + S+S eval in AM  -see above problem  -aspiration precautions.    Problem/Plan - 4:  ·  Problem: WILL (acute kidney injury).   ·  Plan: RESOLVING  patient with will on arrival  baseline cr 0.9 was at 1.75  improvement seen after 1 L NS  likely 2/2 dehydration  -f/u bnp.    Problem/Plan - 5:  ·  Problem: Parkinsons.   ·  Plan: patient with hx of parkinsons  seen with pill rolling tremor on exam  has hx of constipation 2/2 parkinsons  bm every 4 days - last one 4 days ago  -c/w bowel regimen   -pending formal med rec in AM.    Problem/Plan - 6:  ·  Problem: DM (diabetes mellitus).   ·  Plan: patient with hx of diabetes  blood glucose in 200s-300s  -monitor fingersticks  -c/w sliding scale.    Problem/Plan - 7:  ·  Problem: Prophylactic measure.   ·  Plan: F: s/p 1 L  E: replete as needed  N: NPO pending S+S eval  DVT ppx: heparin subq.

## 2022-08-05 NOTE — H&P ADULT - PROBLEM SELECTOR PLAN 4
RESOLVING  patient with robb on arrival  baseline cr 0.9 was at 1.75  improvement seen after 1 L NS  likely 2/2 dehydration  -f/u bnp

## 2022-08-05 NOTE — SWALLOW BEDSIDE ASSESSMENT ADULT - SLP GENERAL OBSERVATIONS
Received sleeping in bed, awoke to verbal stimuli, A&Ox2, with reduced vocal intensity. HHA at bedside. According to him, pt has been tolerating a regular solid diet with thin liquids without any major difficulties. He does, however, require frequent feeding assistance.

## 2022-08-05 NOTE — PHYSICAL THERAPY INITIAL EVALUATION ADULT - ADDITIONAL COMMENTS
Patient demonstrated increased fatigue and lethargy, and unable to provide much background information due to fatigue. However, HHA present at bedside and able to provide PLOF on behalf of patient. Per discussion with HHA, patient required min-A with STS transfers, CGA with gait with RW, and was only able to tolerate ambulating within the household. Patient receives assistance with meal-prepping, household cleaning, bathing/toileting, and sometimes feeding (when patient has increased tremors). Patient has 24/7 HHA and RW at home.

## 2022-08-05 NOTE — PROGRESS NOTE ADULT - PROBLEM SELECTOR PLAN 3
patient appears to have possibly aspirated  has secretions on exam   -c/w suctioning + S+S eval in AM  -see above problem  -aspiration precautions CAP more likely than Aspiration pneumonia  -c/w suctioning + S+S eval in AM  -see above problem and plan  -aspiration precautions

## 2022-08-05 NOTE — H&P ADULT - PROBLEM SELECTOR PLAN 1
patient with severe sepsis on presentation hypotensive, tachycardic, leukocytosis, tachypnea and positive lacate  given 1 L NS in ED   possible source - pna  patient with secretions on exam - likely aspirating. no focal complaints besides coughing   febrile to 101 rectally on arrival  CXR with poor inspiratory effort  -f/u CT scan view of lungs   -c/w ctx azithro  -f/u S+S eval  -suctioning prn  -f/u bcx ucx patient with severe sepsis on presentation hypotensive, tachycardic, leukocytosis, tachypnea and positive lacate  given 1 L NS in ED   possible source - pna  patient with secretions on exam - likely aspirating. no focal complaints besides coughing   febrile to 101 rectally on arrival  CXR with poor inspiratory effort  hx of being limited to bed and not too mobile  -f/u CTPE - tachycardic tachypneic and febrile with hx of being in bed most of the time  -f/u CT scan view of lungs   -c/w ctx azithro  -f/u S+S eval  -suctioning prn  -f/u bcx ucx patient with severe sepsis on presentation hypotensive, tachycardic, leukocytosis, tachypnea and positive lacate  given 1 L NS in ED   possible source - pna  patient with secretions on exam - likely aspirating. no focal complaints besides coughing   febrile to 101 rectally on arrival  CXR with poor inspiratory effort  hx of being limited to bed and not too mobile  -f/u CTPE - tachycardic tachypneic and febrile with hx of being in bed most of the time  -f/u CT scan view of lungs   -c/w ctx azithro  -f/u S+S eval  -suctioning prn  -f/u bcx ucx  -consider serotonin syndrome in setting of psych medications

## 2022-08-05 NOTE — PROGRESS NOTE ADULT - PROBLEM SELECTOR PLAN 6
patient with hx of diabetes  blood glucose in 200s-300s  -monitor fingersticks  -c/w sliding scale patient with hx of parkinsons  seen with pill rolling tremor on exam  has hx of constipation 2/2 parkinsons  bm every 4 days - last one 4 days ago  -c/w bowel regimen patient with hx of parkinsons  seen with pill rolling tremor on exam  has hx of constipation 2/2 parkinsons  bm every 4 days - last one 4 days ago  -c/w bowel regimen  - start home levodopa/carbidopa (0.5 tabs 4x daily, PO)

## 2022-08-05 NOTE — H&P ADULT - PROBLEM SELECTOR PLAN 3
patient appears to have possibly aspirated  has secretions on exam   -c/w suctioning + S+S eval in AM  -see above problem  -aspiration precautions

## 2022-08-05 NOTE — SWALLOW BEDSIDE ASSESSMENT ADULT - SWALLOW EVAL: DIAGNOSIS
Clinically, oropharyngeal swallow was clinically judged to be WFL with no clinical overt s/s of aspiration. However, unable to exclude silent aspiration at bedside. Given pt's hx of PD, concerns for aspiration PNA, and ?acute neuro findings (L facial droop), pt would benefit from instrumental swallow test to further assess swallow anatomy and physiology.

## 2022-08-05 NOTE — H&P ADULT - HISTORY OF PRESENT ILLNESS
83yo M with h/o Parkinsons, HTN, DM, HLD presents after the home health aid called EMS. The HHA at bedside states he called EMS because the patient looked extremely pale and had a blood pressure of 77/60. Patient states that the blood pressure did not get better after he took all his medications. HHA states patient was not able to walk in the past few days and it was due to worsening weakness. His appetite was unchanged. He complains of recent coughing fits over the past few months and now he states that he has a bunch of mucus in his mouth but otherwise feels totally fine. Per HHA patient is now at baseline and the only difference is he is breathing a little heavier. Patients last bowel movement was 4 days ago which he states is standard for him and has no other complaints at this time.    In the ED:  Vitals: Temp 98.3 HR 94 /78 O2 Sat 96%  Labs: WBC 18 Hg 13.9 Plt 168 PTT 32 PT 16 INR 1.39 Lactate 3.3, Na 139 K 3.6 Cl 103 Bicarb 26 AG 10 Cr 1.75 Glucose 313Calcium 8.9   Imaging:  Intervention: Stroke Code called - deemed negative. Azithromycin 500, Ceftriaxone 1g, heparin 5000 q8, 1 L NS    with c/o generalized weakness. initially HHA reported slurred speech and "change in his face" along with hypotension to SBP 80s at home, which persisted after AM medications (possibly took HTN meds but unclear). pt initially activated as a code stroke but after more extensive interviewing including a telestroke visit with Dr. Rawls it was determined that this is not a stroke presentation but rather a hypotension concern. HHA more clearly describes that his face is not newly assymetric but that the change he was intending to point out was palor and the speech changes were related to his mouth being dry. pt has no reported focal deficits including cp, sob, ha, cough, fever, chills, nausea, vomiting, numbness, focal weakness, speech changes, urinary symptoms. pt's also c/o right shoulder pain, atraumatic. pt wearing 2L per NC but does not wear O2 at home.  81yo M with h/o Parkinsons, HTN, DM, HLD presents after the home health aid called EMS. The HHA at bedside states he called EMS because the patient looked extremely pale and had a blood pressure of 77/60. Patient states that the blood pressure did not get better after he took all his medications. HHA states patient was not able to walk in the past few days and it was due to worsening weakness. His appetite was unchanged. He complains of recent coughing fits over the past few months and now he states that he has a bunch of mucus in his mouth but otherwise feels totally fine. Per HHA patient is now at baseline and the only difference is he is breathing a little heavier. Patients last bowel movement was 4 days ago which he states is standard for him and has no other complaints at this time.    In the ED:  Vitals: Temp 98.3 HR 94 /78 O2 Sat 96%  Labs: WBC 18 Hg 13.9 Plt 168 PTT 32 PT 16 INR 1.39 Lactate 3.3, Na 139 K 3.6 Cl 103 Bicarb 26 AG 10 Cr 1.75 Glucose 313Calcium 8.9   Imaging:  Intervention: Stroke Code called - deemed negative. Azithromycin 500, Ceftriaxone 1g, heparin 5000 q8, 1 L NS

## 2022-08-05 NOTE — CONSULT NOTE ADULT - ASSESSMENT
81 year old Male w/ PMH of Parkinson's Disease (on Sinemet), NIDDM, HTN, HLD, Depression, Anxiety on ativan prn, presents w/ generalized weakness and hypotension in setting of aspiration pneumonia. On exam, patient with coghweel rigidity throughout without any lateralizing features. CTH w/ no acute pathology; CTA h/n (to my eye) no acute LVO; Moderate stenosis of the proximal R. ICA; Mild stenosis of the proximal L. ICA. Mild-moderate stenosis of the intrcranial vertebral arteries; CTP (to my eye): Core 0. Tmax >6s delay in the R. temporal and Occipital region.    Impression: ?L. facial droop + significant hypotension (although son states no droop just pale face) possibly consistent w/ R. brain or diffuse cerebral dysfunction. Etiology uncertain, possibilities include infectious encephelopathy (i.e sepsis secondary to aspiration PNA), transient ischemic attack related to hypoperfusion in setting of R. ICA moderate stenosis (SANDI). Although history suggests aspiration PNA as the etiology, would treat as a TIA with DAPT.  The patient has significant large artery atherosclerotic disease and the R. extrcranial carotid should be evaluated to deem whether this is asymptomatic or symptomatic at this time.    Plan:   [] MR brain w/o contrast   [] Carotid US bilateral to confirm degree of stenosis  [] TTE w/ bubble  [] Start ASA 81mg + Plavix 75mg for 3 weeks followed by ASA 81mg indefinitely per CHANCE trial (Treatment for 2ndary stroke prevention in patients w/ TIA or mild strokes w/ NIHSS <3).   [] Lipid panel, A1c  [] Would start Atorvastatin 40mg per ASCVD risk  [] Further recs dependent on imaging and Carotid Dopplers     Case to be discussed with attending Dr. Suzi Obrien 81 year old Male w/ PMH of Parkinson's Disease (on Sinemet), NIDDM, HTN, HLD, Depression, Anxiety on ativan prn, presents w/ generalized weakness and hypotension in setting of aspiration pneumonia. On exam, patient with coghweel rigidity throughout without any lateralizing features. CTH w/ no acute pathology; CTA h/n (to my eye) no acute LVO; Moderate stenosis of the proximal R. ICA; Mild stenosis of the proximal L. ICA. Mild-moderate stenosis of the intrcranial vertebral arteries; CTP (to my eye): Core 0. Tmax >6s delay in the R. temporal and Occipital region.    Impression: ?L. facial droop + significant hypotension (although son states no droop just pale face) possibly consistent w/ R. brain or diffuse cerebral dysfunction. Etiology uncertain, possibilities include infectious encephelopathy (i.e sepsis secondary to aspiration PNA), transient ischemic attack related to hypoperfusion in setting of R. ICA moderate stenosis (SANDI). Although history suggests aspiration PNA as the etiology, would treat as a TIA with DAPT.  The patient has significant large artery atherosclerotic disease and the R. extrcranial carotid should be evaluated to deem whether this is asymptomatic or symptomatic at this time.    Plan:   [] MR brain w/o contrast   [] Carotid US bilateral to confirm degree of stenosis  [] TTE w/ bubble  [] Start ASA 81mg + Plavix 75mg for 3 weeks followed by ASA 81mg indefinitely per CHANCE trial (Treatment for 2ndary stroke prevention in patients w/ TIA or mild strokes w/ NIHSS <3).   [] If stroke identified on MRI, may need ILR per STROKE AF trial   [] Lipid panel, A1c  [] Would start Atorvastatin 40mg per ASCVD risk  [] Further recs dependent on imaging and Carotid Dopplers     Case to be discussed with attending Dr. Suzi Obrien 81 year old Male w/ PMH of Parkinson's Disease (on Sinemet), NIDDM, HTN, HLD, Depression, Anxiety on ativan prn, presents w/ generalized weakness and hypotension in setting of aspiration pneumonia. On exam, patient with coghweel rigidity throughout without any lateralizing features. CTH w/ no acute pathology; CTA h/n (to my eye) no acute LVO; Moderate stenosis of the proximal R. ICA; Mild stenosis of the proximal L. ICA. Mild-moderate stenosis of the intrcranial vertebral arteries; CTP (to my eye): Core 0. Tmax >6s delay in the R. temporal and Occipital region.    Impression: ?L. facial droop + significant hypotension (although son states no droop just pale face) possibly consistent w/ R. brain or diffuse cerebral dysfunction. Etiology uncertain, possibilities include infectious encephelopathy (i.e sepsis secondary to aspiration PNA), transient ischemic attack related to hypoperfusion in setting of R. ICA moderate stenosis (SANDI). Although history suggests aspiration PNA as the etiology, would treat as a TIA with DAPT.  The patient has significant large artery atherosclerotic disease and the R. extrcranial carotid should be evaluated to deem whether this is asymptomatic or symptomatic at this time.    Plan:   [] MR brain w/o contrast   [] Carotid US bilateral to confirm degree of stenosis  [] TTE w/ bubble  [] Start ASA 81mg + Plavix 75mg for 3 weeks followed by ASA 81mg indefinitely per CHANCE trial (Treatment for 2ndary stroke prevention in patients w/ TIA or mild strokes w/ NIHSS <3).   [] If infarct identified on MRI, may need ILR per STROKE AF trial   [] Lipid panel, A1c  [] Would start Atorvastatin 40mg per ASCVD risk  [] Further recs dependent on imaging and Carotid Dopplers     Case to be discussed with attending Dr. Suzi Obrien

## 2022-08-05 NOTE — H&P ADULT - ATTENDING COMMENTS
Patient was seen and examined by me at bedside 8/5/22 . I agree with resident's note, subjective, objective physical exam, assessment and plan with following modifications/additions.    Sepsis  Gram negative Pneumonia  Acute Metabolic Encephalopathy        Add flagyl   Check lactate   Check CT Abd and Pelvis   IV fluids   Npo until no evidence of perforation Patient was seen and examined by me at bedside 8/5/22 . I agree with resident's note, subjective, objective physical exam, assessment and plan with following modifications/additions.    Sepsis  Gram negative Pneumonia  Acute Metabolic Encephalopathy    WILL       Add flagyl   Check lactate   Check CT Abd and Pelvis   IV fluids   Npo until no evidence of perforation

## 2022-08-05 NOTE — PROGRESS NOTE ADULT - PROBLEM SELECTOR PLAN 2
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 Mild stenosis the proximal left ICA.  patient takes ativan q4 at home per HHA however does not know dose  due to level of secretions will hold off on further ativan now. 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 Moderate stenosis the proximal right ICA. Mild stenosis the proximal left ICA.  patient takes ativan q4 at home  due to level of secretions will hold off on further ativan now. 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 Moderate stenosis the proximal right ICA. Mild stenosis the proximal left ICA.  patient takes ativan q4 at home  due to level of secretions will hold off on further ativan now. 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 Moderate stenosis the proximal right ICA. Mild stenosis the proximal left ICA.  patient takes ativan q4 at home  due to level of secretions will hold off on further ativan now. no signs of withdrawal at this time - tremor is pill rolling   -[facility] protocol   -stroke consulted:   - f/u neuro recs: MR brain w/o contrast, Carotid US b/l to confirm degree of stenosis, TTE w/ bubble, Start ASA 81mg + Plavix 75mg for 3 weeks followed by ASA 81mg indefinitely , Lipid panel, A1c, Atorvastatin 40mg per ASCVD risk  -c/w above plan

## 2022-08-05 NOTE — SWALLOW VFSS/MBS ASSESSMENT ADULT - ADDITIONAL RECOMMENDATIONS
Control risk factors for aspiration PNA via frequent oral hygiene and increasing physical mobility as tolerated

## 2022-08-05 NOTE — SWALLOW BEDSIDE ASSESSMENT ADULT - SLP PERTINENT HISTORY OF CURRENT PROBLEM
81yo M with h/o Parkinsons, HTN, DM, HLD presents after the home health aid called EMS because the patient looked extremely pale and hypotensive. He was admitted for severe sepsis likely 2/2 CAP. Neuro following. Stroke code called in ED. Pt with L facial droop. Per neuro note, "Etiology uncertain, possibilities include infectious encephelopathy (i.e sepsis secondary to aspiration PNA), transient ischemic attack related to hypoperfusion in setting of R. ICA moderate stenosis (SANDI)."

## 2022-08-05 NOTE — H&P ADULT - PROBLEM SELECTOR PLAN 2
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 Mild stenosis the proximal left ICA.  patient takes ativan q4 at home per HHA however does not know dose  due to level of secretions will hold off on further ativan now. 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 Mild stenosis the proximal left ICA.  patient takes ativan q4 at home per HHA however does not know dose  due to level of secretions will hold off on further ativan now. no signs of withdrawal at this time - tremoring seen pill rolling   -ciwa protocol   -consult stroke service in AM to follow for recs  -c/w above plan  -obtain med rec  -recommend neuro consult

## 2022-08-05 NOTE — SWALLOW BEDSIDE ASSESSMENT ADULT - NS SPL SWALLOW CLINIC TRIAL FT
Unable to self-feed d/t tremulous hands. Mildly prolonged mastication of solids but relatively efficient bolus clearance. Laryngeal movement palpated. No clinical overt s/s of aspiration appreciated. Pt denied globus sensation.

## 2022-08-05 NOTE — H&P ADULT - NSHPPHYSICALEXAM_GEN_ALL_CORE
PHYSICAL EXAM:    GENERAL: tachypneic pill rolling tremors  HEAD:  Normocephalic, atraumatic  EYES: EOMI, PERRLA  HEENT: Moist mucous membranes  NECK: Supple, No JVD  NERVOUS SYSTEM:  Alert & Oriented X2, Motor Strength 3/5 B/L  lower extremities 5/5 B/L upper extremities   CHEST/LUNG: rales at b/l lower lobes   HEART: Regular rate and rhythm  ABDOMEN: Soft, non tender, distended, Bowel sounds present  GENITOURINARY: Voiding, no palpable bladder  EXTREMITIES:   No clubbing, cyanosis, or edema  MUSCULOSKELETAL- No muscle tenderness, no joint tenderness  SKIN-no rash

## 2022-08-05 NOTE — PROGRESS NOTE ADULT - ASSESSMENT
83yo M with h/o Parkinsons, HTN, DM, HLD presents after the home health aid called EMS. The HHA at bedside states he called EMS because the patient looked extremely pale and hypotensive admitted for severe sepsis.  81yo M with h/o Parkinsons, HTN, DM, HLD presents after the home health aid called EMS because the patient looked extremely pale and hypotensive. He was admitted for severe sepsis likely 2/2 CAP.  81yo M with h/o Parkinsons, HTN, DM, HLD presents after the home health aid called EMS because the patient looked extremely pale and hypotensive. He was admitted for severe sepsis likely 2/2 CAP with possible right sided abdominal inflammation seen on CTA chest.

## 2022-08-05 NOTE — PROGRESS NOTE ADULT - PROBLEM SELECTOR PLAN 4
RESOLVING  patient with robb on arrival  baseline cr 0.9 was at 1.75  improvement seen after 1 L NS  likely 2/2 dehydration  -f/u bnp Possible acalculous judy + possible right sided diverticulitis (Had right abdominal tenderness, rebound and guarding)- CTPE: No PE. Found evidence of Distended acalculus gallbladder with thickened wall, adjacent hepatic flexure marked mural thickening and extensive inflammation in right upper quadrant, probable acalculous cholecystitis, less likely right-sided acute diverticulitis)  - see above problem and plan Had right abdominal tenderness, rebound and guarding, CTPE found evidence of probable acalculous cholecystitis, less likely right-sided acute diverticulitis  - see above problem and plan

## 2022-08-05 NOTE — PHYSICAL THERAPY INITIAL EVALUATION ADULT - GENERAL OBSERVATIONS, REHAB EVAL
Patient received in semi-supine, on RA, with R IV heplock, +texas catheter, lethargic, in no acute distress, with HHA at bedside. Patient demonstrated reduced endurance and independence with bed mobility, transfers, and gait with RW. Patient can strongly benefit from skilled PT intervention at this time to improve functional endurance, strength, and balance.

## 2022-08-05 NOTE — PHYSICAL THERAPY INITIAL EVALUATION ADULT - PERTINENT HX OF CURRENT PROBLEM, REHAB EVAL
Pt is an 83 y/o M who was admitted to ED by HHA due to reduced BP, increased fatigue/weakness, pale appearance, recent coughing fits, and difficulty walking. Pt has h/o of PD, HTN, DM, depression, melanoma, and vertigo. Pt found to have severe sepsis and admitted for stroke w/u.

## 2022-08-05 NOTE — PROGRESS NOTE ADULT - PROBLEM SELECTOR PLAN 5
patient with hx of parkinsons  seen with pill rolling tremor on exam  has hx of constipation 2/2 parkinsons  bm every 4 days - last one 4 days ago  -c/w bowel regimen   -pending formal med rec in AM patient with hx of parkinsons  seen with pill rolling tremor on exam  has hx of constipation 2/2 parkinsons  bm every 4 days - last one 4 days ago  -c/w bowel regimen RESOLVING  patient with robb on arrival  baseline cr 0.9 was at 1.75  improvement seen after 1 L NS  likely 2/2 dehydration  -f/u bnp

## 2022-08-05 NOTE — SWALLOW VFSS/MBS ASSESSMENT ADULT - PHARYNGEAL PHASE COMMENTS
Swallow trigger was delayed (time of delay was variable) with liquids spilling to the piriform sinuses prior to the swallow. Laryngeal penetration of all consistencies occurred during the swallow as a result of mistimed laryngeal vestibule closure. Epiglottic inversion was variable; only contacting the posterior pharyngeal wall at times and fully retroflexing at other times. Reduced base of tongue to posterior pharyngeal wall contact, reduced hyoid displacement/UES opening, and incomplete pharyngeal stripping wave resulted in mild puree and solid stasis within the vallecula and in the piriform sinuses and a trace amount along the base of tongue and PPW. At times, a trace amount of puree/solid residue did spill over the AE folds and into the laryngeal vestibule after the swallow. Pt benefited from a liquid wash to reduce pharyngeal residue.

## 2022-08-05 NOTE — CONSULT NOTE ADULT - SUBJECTIVE AND OBJECTIVE BOX
ALPHONSE NORIEGA  Male  MRN-[id number]    HPI:    81 year old Male w/ PMH of Parkinson's Disease (on Sinemet), NIDDM, HTN, HLD, Depression, Anxiety on ativan prn, presents w/ generalized weakness and hypotension; Teletroke code called given reported L. facial droop. I examined the patient via [employer] video cart yesterday and this morning. I asked the patient why he came to the hospital and he states "I had a stroke". When asked to elaborate on this patient doesn't really participate in further in questioning. Family was at bedside who provided further history. Per family, patient was weak throughout this morning and so they checked his BP and it was 70s systolic. Family member later noted that "his face was pale" but NO facial droop was endorsed. Patient brought to the ER for these complaints. /78. NIHSS 0 on arrival.  HHA states patient was not able to walk in the past few days and it was due to worsening weakness. His appetite was unchanged. He complains of recent coughing fits over the past few months and now he states that he has a bunch of mucus in his mouth but otherwise feels totally fine. Per HHA patient is now at baseline and the only difference is he is breathing a little heavier. Patients last bowel movement was 4 days ago which he states is standard for him and has no other complaints at this time.    CTH (to my eye): No acute pathology; Age appropriate atrophy.   CTA h/n (to my eye): no acute LVO; Moderate stenosis of the proximal R. ICA; Mild stenosis of the proximal L. ICA. Mild-moderate stenosis of the intrcranial vertebral arteries.   CTP (to my eye): Core 0. Tmax >6s delay in the R. temporal and Occipital region.     ROS: All negative except as mentioned in HPI    PAST MEDICAL & SURGICAL HISTORY:  DM2 (diabetes mellitus, type 2)  since 5/16/2017      Parkinsons      Hypertension      Hyperlipidemia      Depression      Melanoma      Depression      Tinnitus of left ear      Vertigo      No significant past surgical history        MEDICATIONS  (STANDING):  azithromycin   Tablet 500 milliGRAM(s) Oral every 24 hours  cefTRIAXone   IVPB 2000 milliGRAM(s) IV Intermittent every 24 hours  dextrose 5%. 1000 milliLiter(s) (50 mL/Hr) IV Continuous <Continuous>  dextrose 5%. 1000 milliLiter(s) (100 mL/Hr) IV Continuous <Continuous>  dextrose 50% Injectable 25 Gram(s) IV Push once  dextrose 50% Injectable 12.5 Gram(s) IV Push once  dextrose 50% Injectable 25 Gram(s) IV Push once  glucagon  Injectable 1 milliGRAM(s) IntraMuscular once  heparin   Injectable 5000 Unit(s) SubCutaneous every 8 hours  insulin lispro (ADMELOG) corrective regimen sliding scale   SubCutaneous Before meals and at bedtime  LORazepam     Tablet 1 milliGRAM(s) Oral every 12 hours  polyethylene glycol 3350 17 Gram(s) Oral every 24 hours  potassium chloride    Tablet ER 20 milliEquivalent(s) Oral once  senna 1 Tablet(s) Oral every 24 hours    MEDICATIONS  (PRN):  dextrose Oral Gel 15 Gram(s) Oral once PRN Blood Glucose LESS THAN 70 milliGRAM(s)/deciliter    Allergies    No Known Allergies    Intolerances        VITAL SIGNS:  T(F): 99.2  HR: 113  BP: 90/57  RR: 19  SpO2: 96%    MS: Oriented to self and hospital but not date; Fluent; Follows simple commands.   CN: BTT bilaterally; EOMI; Face symmetric. t/u midline.   Motor: Cogwheel rigidity in all 4 limbs (L  >R , UE >LE); Resting tremor L >R; Full strength throughout.   Sensory: Intact to LT and PP throughout   Reflexes: 1+ throughout. Babinski absent bilaterally.   Coordination: No dysmetria on FNF or ataxia on HTS bilaterally   Gait: Deferred    LABS:                          13.0   14.81 )-----------( 137      ( 05 Aug 2022 05:30 )             38.4     08-05    140  |  104  |  42<H>  ----------------------------<  228<H>  3.4<L>   |  24  |  1.02    Ca    9.1      05 Aug 2022 05:30  Phos  2.9     08-05  Mg     2.0     08-05    TPro  6.1  /  Alb  3.2<L>  /  TBili  0.4  /  DBili  0.2  /  AST  33  /  ALT  21  /  AlkPhos  64  08-05    PT/INR - ( 04 Aug 2022 14:58 )   PT: 16.3 sec;   INR: 1.39          PTT - ( 04 Aug 2022 14:58 )  PTT:32.8 sec    RADIOLOGY & ADDITIONAL STUDIES:

## 2022-08-05 NOTE — PROGRESS NOTE ADULT - PROBLEM SELECTOR PLAN 1
patient with severe sepsis on presentation hypotensive, tachycardic, leukocytosis, tachypnea and positive lacate  given 1 L NS in ED   possible source - pna  patient with secretions on exam - likely aspirating. no focal complaints besides coughing   febrile to 101 rectally on arrival  CXR with poor inspiratory effort  hx of being limited to bed and not too mobile  -f/u CTPE - tachycardic tachypneic and febrile with hx of being in bed most of the time  -f/u CT scan view of lungs   -c/w ctx azithro  -f/u S+S eval  -suctioning prn  -f/u bcx ucx  -consider serotonin syndrome in setting of psych medications patient with severe sepsis on presentation hypotensive, tachycardic, leukocytosis, tachypnea and positive lacate  given 1 L NS in ED   possible source - pna (likely CAP, less likely aspiration pneumonia)  patient with secretions on exam - no focal complaints besides coughing   febrile to 101 rectally on arrival  CXR (8/4) with poor inspiratory effort  CXR (8/5) showed Bilateral opacities/left basilar discoid atelectasis.  Procal elevated  Baseline- able to get OOB and walk with walker  -c/w ceftriaxone (2000 mg q24h) and azithromycin (500 mg q24h)  - f/u D-dimer: rule out PE: tachycardic, tachypneic, febrile, hx of being in bed most of time- though less likely b/c normal spO2  -f/u S+S eval: HHA says baseline no problems swallowing, eats regular diet  -suctioning prn  -f/u bcx ucx  -f/u Legionella antigen results patient with severe sepsis on presentation: hypotensive, tachycardic, leukocytosis, tachypnea and positive lactate, febrile to 101 rectally on arrival  given 1 L NS in ED  Possible source -   1. PNA (likely CAP, less likely aspiration pneumonia): patient endorses cough, Procal elevated, CXR shows opacities but no consolidation, HHA says at baseline pt has no problems swallowing, eats regular diet, pt has secretions on exam  - CXR with poor inspiratory effort (8/4), Bilateral opacities/left basilar discoid atelectasis (8/5)  -c/w ceftriaxone (2000 mg q24h) and azithromycin (500 mg q24h)  - CTPE found no PE and f/u D-dimer- tachycardic, tachypneic, febrile, hx of being in bed most of time- though less likely b/c normal spO2  -f/u S+S eval: getting barium swallow  -suctioning prn  -f/u bcx ucx  -f/u Legionella antigen results    2. Possible acalculous judy + possible right sided diverticulitis (Had right abdominal tenderness, rebound and guarding)- CTPE: No PE. Found evidence of Distended acalculus gallbladder with thickened wall, adjacent hepatic flexure marked mural thickening and extensive inflammation in right upper quadrant, probable acalculous cholecystitis, less likely right-sided acute diverticulitis)  - f/u CT abd + pelv with IV contrast  - Add metronidazole (500 mg q8h) patient with severe sepsis on presentation: hypotensive, tachycardic, leukocytosis, tachypnea and positive lactate, febrile to 101 rectally on arrival  given 1 L NS in ED  Possible source -   1. PNA (likely CAP, less likely aspiration pneumonia): patient endorses cough, Procal elevated, CXR shows opacities but no consolidation, HHA says at baseline pt has no problems swallowing, eats regular diet, pt has secretions on exam  - CXR with poor inspiratory effort (8/4), Bilateral opacities/left basilar discoid atelectasis (8/5)  -c/w ceftriaxone (2000 mg q24h) and azithromycin (500 mg q24h)  - CTPE found no PE and f/u D-dimer- tachycardic, tachypneic, febrile, hx of being in bed most of time- though less likely b/c normal spO2  -f/u S+S eval: getting barium swallow  -suctioning prn  -f/u bcx ucx  -f/u Legionella antigen results    2. Possible acalculous judy + possible right sided diverticulitis (Had right abdominal tenderness, rebound and guarding- CTPE: No PE. Found evidence of extensive inflammation in right upper quadrant, probable acalculous cholecystitis, less likely right-sided acute diverticulitis)  - f/u CT abd + pelv with IV contrast  - Add metronidazole (500 mg q8h) patient with severe sepsis on presentation: hypotensive, tachycardic, leukocytosis, tachypnea and positive lactate, febrile to 101 rectally on arrival  given 1 L NS in ED  Possible source -   1. PNA (likely CAP, less likely aspiration pneumonia): patient endorses cough, Procal elevated, CXR shows opacities but no consolidation, HHA says at baseline pt has no problems swallowing, eats regular diet, pt has secretions on exam  - CXR with poor inspiratory effort (8/4), Bilateral opacities/left basilar discoid atelectasis (8/5)  -c/w ceftriaxone (2000 mg q24h) and azithromycin (500 mg q24h)  - CTPE found no PE and f/u D-dimer- tachycardic, tachypneic, febrile, hx of being in bed most of time- though less likely b/c normal spO2  -S+S recs: suggest Soft and bite sized solids + thin liquids, strict aspiration precautions, f/u VFSS/MBS  -suctioning prn  -f/u bcx ucx  -f/u Legionella antigen results    2. Possible acalculous judy + possible right sided diverticulitis (Had right abdominal tenderness, rebound and guarding- CTPE: No PE. Found evidence of extensive inflammation in right upper quadrant, probable acalculous cholecystitis, less likely right-sided acute diverticulitis)  - f/u CT abd + pelv with IV contrast  - Add metronidazole (500 mg q8h) patient with severe sepsis on presentation: hypotensive, tachycardic, leukocytosis, tachypnea and positive lactate, febrile to 101 rectally on arrival  given 1 L NS in ED  Possible source -   1. PNA (likely CAP, less likely aspiration pneumonia): patient endorses cough, Procal elevated, CXR shows opacities but no consolidation, HHA says at baseline pt has no problems swallowing, eats regular diet, pt has secretions on exam  - CXR with poor inspiratory effort (8/4), Bilateral opacities/left basilar discoid atelectasis (8/5)  -c/w ceftriaxone (2000 mg q24h) and azithromycin (500 mg q24h)  - CTPE found no PE and f/u D-dimer- tachycardic, tachypneic, febrile, hx of being in bed most of time- though less likely b/c normal spO2  -S+S recs: suggest Soft and bite sized solids + thin liquids, strict aspiration precautions, f/u VFSS/MBS  -suctioning prn  -f/u bcx ucx  -f/u Legionella antigen results    2. Possible acalculous judy + possible right sided diverticulitis (Had right abdominal tenderness, rebound and guarding- CTPE: No PE. Found evidence of extensive inflammation in right upper quadrant, probable acalculous cholecystitis, less likely right-sided acute diverticulitis)  - f/u CT abd + pelv with IV contrast  - NPO until perforation ruled out  - Add metronidazole (500 mg q8h) patient with severe sepsis on presentation: hypotensive, tachycardic, leukocytosis, tachypnea and positive lactate, febrile to 101 rectally on arrival  given 1 L NS in ED  Possible source -   1. PNA (likely CAP, less likely aspiration pneumonia): patient endorses cough, Procal elevated, CXR shows opacities but no consolidation, HHA says at baseline pt has no problems swallowing, eats regular diet, pt has secretions on exam  - CXR with poor inspiratory effort (8/4), Bilateral opacities/left basilar discoid atelectasis (8/5)  -c/w ceftriaxone (2000 mg q24h) and azithromycin (500 mg q24h)  -S+S recs: suggest Soft and bite sized solids + thin liquids, strict aspiration precautions, f/u VFSS/MBS  -suctioning prn  -f/u bcx ucx  -f/u Legionella antigen results  - CTPE found no PE and f/u D-dimer- rule out PE (tachycardic, tachypneic, febrile, in bed)    2. Possible acalculous judy + possible right sided diverticulitis (Had right abdominal tenderness, rebound and guarding- CTPE: No PE. Found evidence of extensive inflammation in right upper quadrant, probable acalculous cholecystitis, less likely right-sided acute diverticulitis)  - f/u CT abd + pelv with IV contrast  - NPO until perforation ruled out  - Add metronidazole (500 mg q8h)

## 2022-08-05 NOTE — PROGRESS NOTE ADULT - SUBJECTIVE AND OBJECTIVE BOX
** INCOMPLETE **    SUBJECTIVE/ OVERNIGHT EVENTS:  Patient seen and examined at bedside in AM.   Patient in no apparent distress, says he has had a cough but no sputum, and says he felt hot overnight. He denies any abdominal pain, n/v/d, dysuria, CP, SOB, headache.      VITAL SIGNS:  Vital Signs Last 24 Hrs  T(C): 37.3 (05 Aug 2022 05:44), Max: 38.6 (05 Aug 2022 04:52)  T(F): 99.2 (05 Aug 2022 05:44), Max: 101.4 (05 Aug 2022 04:52)  HR: 113 (05 Aug 2022 05:44) (94 - 129)  BP: 90/57 (05 Aug 2022 05:44) (90/57 - 156/96)  BP(mean): --  RR: 19 (05 Aug 2022 05:44) (18 - 19)  SpO2: 96% (05 Aug 2022 05:44) (92% - 96%)    Parameters below as of 05 Aug 2022 05:44  Patient On (Oxygen Delivery Method): room air        PHYSICAL EXAM:  General: NAD; speaking in full sentences  HEENT: NC/AT; PERRL; EOMI; MMM  Neck: supple; no JVD  Cardiac: RRR; +S1/S2  Pulm: CTA B/L; no W/R/R  GI: soft, NT/ND, +BS  Extremities: WWP; no edema, clubbing or cyanosis  Vasc: 2+ radial, DP pulses B/L  Neuro: AAOx3; no focal deficits    MEDICATIONS:  MEDICATIONS  (STANDING):  azithromycin   Tablet 500 milliGRAM(s) Oral every 24 hours  cefTRIAXone   IVPB 2000 milliGRAM(s) IV Intermittent every 24 hours  dextrose 5%. 1000 milliLiter(s) (50 mL/Hr) IV Continuous <Continuous>  dextrose 5%. 1000 milliLiter(s) (100 mL/Hr) IV Continuous <Continuous>  dextrose 50% Injectable 25 Gram(s) IV Push once  dextrose 50% Injectable 12.5 Gram(s) IV Push once  dextrose 50% Injectable 25 Gram(s) IV Push once  glucagon  Injectable 1 milliGRAM(s) IntraMuscular once  heparin   Injectable 5000 Unit(s) SubCutaneous every 8 hours  insulin lispro (ADMELOG) corrective regimen sliding scale   SubCutaneous three times a day before meals  LORazepam     Tablet 1 milliGRAM(s) Oral every 12 hours  polyethylene glycol 3350 17 Gram(s) Oral every 24 hours  senna 1 Tablet(s) Oral every 24 hours    MEDICATIONS  (PRN):  dextrose Oral Gel 15 Gram(s) Oral once PRN Blood Glucose LESS THAN 70 milliGRAM(s)/deciliter      ALLERGIES:  Allergies    No Known Allergies    Intolerances        LABS:                        13.0   14.81 )-----------( 137      ( 05 Aug 2022 05:30 )             38.4     08-05    140  |  104  |  42<H>  ----------------------------<  228<H>  3.4<L>   |  24  |  1.02    Ca    9.1      05 Aug 2022 05:30  Phos  2.9     08-05  Mg     2.0     08-05    TPro  6.1  /  Alb  3.2<L>  /  TBili  0.4  /  DBili  0.2  /  AST  33  /  ALT  21  /  AlkPhos  64  08-05    PT/INR - ( 04 Aug 2022 14:58 )   PT: 16.3 sec;   INR: 1.39          PTT - ( 04 Aug 2022 14:58 )  PTT:32.8 sec    RADIOLOGY & ADDITIONAL TESTS: Reviewed. ** INCOMPLETE **    SUBJECTIVE/ OVERNIGHT EVENTS:  Patient seen and examined at bedside in AM.   Patient in no apparent distress, says he has had a cough but no sputum, and says he felt hot overnight. He denies any abdominal pain, n/v/d, dysuria, CP, SOB, headache. According to Home health aid, patient is at his baseline mentation status and not exhibiting more confusion than usual.       VITAL SIGNS:  Vital Signs Last 24 Hrs  T(C): 37.3 (05 Aug 2022 05:44), Max: 38.6 (05 Aug 2022 04:52)  T(F): 99.2 (05 Aug 2022 05:44), Max: 101.4 (05 Aug 2022 04:52)  HR: 113 (05 Aug 2022 05:44) (94 - 129)  BP: 90/57 (05 Aug 2022 05:44) (90/57 - 156/96)  BP(mean): --  RR: 19 (05 Aug 2022 05:44) (18 - 19)  SpO2: 96% (05 Aug 2022 05:44) (92% - 96%)    Parameters below as of 05 Aug 2022 05:44  Patient On (Oxygen Delivery Method): room air        PHYSICAL EXAM:  General: NAD; has pill-rolling resting tremor; speech is slow   HEENT: NC/AT; PERRL; EOMI  Cardiac: RRR; +S1/S2  Pulm: CTA B/L; no W/R/R  GI: Distended, RLQ tenderness to palpation; +BS  Extremities: warm and well perfused; pitting edema in Lower Extrs b/l worse in Right LE  Vasc: 2+ radial, DP pulses B/L  Neuro: AAOx2; CN II-XII intact; 2-3/5 motor strength in Lower extr b/l, 4/5 in Upper extr b/l, no sensory loss or focal deficits; reflexes 2+ b/l    MEDICATIONS:  MEDICATIONS  (STANDING):  azithromycin   Tablet 500 milliGRAM(s) Oral every 24 hours  cefTRIAXone   IVPB 2000 milliGRAM(s) IV Intermittent every 24 hours  dextrose 5%. 1000 milliLiter(s) (50 mL/Hr) IV Continuous <Continuous>  dextrose 5%. 1000 milliLiter(s) (100 mL/Hr) IV Continuous <Continuous>  dextrose 50% Injectable 25 Gram(s) IV Push once  dextrose 50% Injectable 12.5 Gram(s) IV Push once  dextrose 50% Injectable 25 Gram(s) IV Push once  glucagon  Injectable 1 milliGRAM(s) IntraMuscular once  heparin   Injectable 5000 Unit(s) SubCutaneous every 8 hours  insulin lispro (ADMELOG) corrective regimen sliding scale   SubCutaneous three times a day before meals  LORazepam     Tablet 1 milliGRAM(s) Oral every 12 hours  polyethylene glycol 3350 17 Gram(s) Oral every 24 hours  senna 1 Tablet(s) Oral every 24 hours    MEDICATIONS  (PRN):  dextrose Oral Gel 15 Gram(s) Oral once PRN Blood Glucose LESS THAN 70 milliGRAM(s)/deciliter      ALLERGIES:  Allergies    No Known Allergies    Intolerances        LABS:                        13.0   14.81 )-----------( 137      ( 05 Aug 2022 05:30 )             38.4     08-05    140  |  104  |  42<H>  ----------------------------<  228<H>  3.4<L>   |  24  |  1.02    Ca    9.1      05 Aug 2022 05:30  Phos  2.9     08-05  Mg     2.0     08-05    TPro  6.1  /  Alb  3.2<L>  /  TBili  0.4  /  DBili  0.2  /  AST  33  /  ALT  21  /  AlkPhos  64  08-05    PT/INR - ( 04 Aug 2022 14:58 )   PT: 16.3 sec;   INR: 1.39          PTT - ( 04 Aug 2022 14:58 )  PTT:32.8 sec    RADIOLOGY & ADDITIONAL TESTS: Reviewed. ** INCOMPLETE **    SUBJECTIVE/ OVERNIGHT EVENTS:  Patient seen and examined at bedside in AM.   Patient in no apparent distress, says he has had a cough but no sputum, and says he felt hot overnight. He denies any abdominal pain, n/v/d, dysuria, CP, SOB, headache. According to Home health aid, patient is at his baseline mentation status and not exhibiting more confusion than usual.       VITAL SIGNS:  Vital Signs Last 24 Hrs  T(C): 37.3 (05 Aug 2022 05:44), Max: 38.6 (05 Aug 2022 04:52)  T(F): 99.2 (05 Aug 2022 05:44), Max: 101.4 (05 Aug 2022 04:52)  HR: 113 (05 Aug 2022 05:44) (94 - 129)  BP: 90/57 (05 Aug 2022 05:44) (90/57 - 156/96)  BP(mean): --  RR: 19 (05 Aug 2022 05:44) (18 - 19)  SpO2: 96% (05 Aug 2022 05:44) (92% - 96%)    Parameters below as of 05 Aug 2022 05:44  Patient On (Oxygen Delivery Method): room air        PHYSICAL EXAM:  General: NAD; has pill-rolling resting tremor; speech is slow   HEENT: NC/AT; PERRL; EOMI  Cardiac: RRR; +S1/S2  Pulm: CTA B/L; no W/R/R  GI: Distended, RLQ tenderness to palpation with rebound tenderness and guarding; +BS  Extremities: warm and well perfused; pitting edema in Lower Extrs b/l worse in Right LE  Vasc: 2+ radial, DP pulses B/L  Neuro: AAOx2; CN II-XII intact; 2-3/5 motor strength in Lower extr b/l, 4/5 in Upper extr b/l, no sensory loss or focal deficits; reflexes 2+ b/l    MEDICATIONS:  MEDICATIONS  (STANDING):  azithromycin   Tablet 500 milliGRAM(s) Oral every 24 hours  cefTRIAXone   IVPB 2000 milliGRAM(s) IV Intermittent every 24 hours  dextrose 5%. 1000 milliLiter(s) (50 mL/Hr) IV Continuous <Continuous>  dextrose 5%. 1000 milliLiter(s) (100 mL/Hr) IV Continuous <Continuous>  dextrose 50% Injectable 25 Gram(s) IV Push once  dextrose 50% Injectable 12.5 Gram(s) IV Push once  dextrose 50% Injectable 25 Gram(s) IV Push once  glucagon  Injectable 1 milliGRAM(s) IntraMuscular once  heparin   Injectable 5000 Unit(s) SubCutaneous every 8 hours  insulin lispro (ADMELOG) corrective regimen sliding scale   SubCutaneous three times a day before meals  LORazepam     Tablet 1 milliGRAM(s) Oral every 12 hours  polyethylene glycol 3350 17 Gram(s) Oral every 24 hours  senna 1 Tablet(s) Oral every 24 hours    MEDICATIONS  (PRN):  dextrose Oral Gel 15 Gram(s) Oral once PRN Blood Glucose LESS THAN 70 milliGRAM(s)/deciliter      ALLERGIES:  Allergies    No Known Allergies    Intolerances        LABS:                        13.0   14.81 )-----------( 137      ( 05 Aug 2022 05:30 )             38.4     08-05    140  |  104  |  42<H>  ----------------------------<  228<H>  3.4<L>   |  24  |  1.02    Ca    9.1      05 Aug 2022 05:30  Phos  2.9     08-05  Mg     2.0     08-05    TPro  6.1  /  Alb  3.2<L>  /  TBili  0.4  /  DBili  0.2  /  AST  33  /  ALT  21  /  AlkPhos  64  08-05    PT/INR - ( 04 Aug 2022 14:58 )   PT: 16.3 sec;   INR: 1.39          PTT - ( 04 Aug 2022 14:58 )  PTT:32.8 sec    RADIOLOGY & ADDITIONAL TESTS: Reviewed.

## 2022-08-05 NOTE — H&P ADULT - NSHPLABSRESULTS_GEN_ALL_CORE
.  LABS:                         13.9   18.77 )-----------( 168      ( 04 Aug 2022 14:58 )             41.2     08-05    141  |  104  |  39<H>  ----------------------------<  223<H>  3.5   |  24  |  1.05    Ca    8.9      05 Aug 2022 02:56    TPro  7.2  /  Alb  3.4  /  TBili  0.7  /  DBili  x   /  AST  22  /  ALT  6<L>  /  AlkPhos  59  08-04    PT/INR - ( 04 Aug 2022 14:58 )   PT: 16.3 sec;   INR: 1.39          PTT - ( 04 Aug 2022 14:58 )  PTT:32.8 sec  Urinalysis Basic - ( 04 Aug 2022 16:18 )    Color: Yellow / Appearance: Clear / SG: <=1.005 / pH: x  Gluc: x / Ketone: NEGATIVE  / Bili: NEGATIVE / Urobili: 0.2 E.U./dL   Blood: x / Protein: Trace mg/dL / Nitrite: NEGATIVE   Leuk Esterase: NEGATIVE / RBC: < 5 /HPF / WBC < 5 /HPF   Sq Epi: x / Non Sq Epi: 0-5 /HPF / Bacteria: Present /HPF        Lactate, Blood: 2.0 mmol/L (08-05 @ 02:56)  Lactate, Blood: 2.5 mmoL/L (08-04 @ 18:39)      RADIOLOGY, EKG & ADDITIONAL TESTS: Reviewed.

## 2022-08-05 NOTE — CONSULT NOTE ADULT - SUBJECTIVE AND OBJECTIVE BOX
Patient is a 82y old  Male who presents with a chief complaint of weakness (05 Aug 2022 03:20)        HPI:  81yo M with h/o Parkinsons, HTN, DM, HLD presents after the home health aid called EMS. The HHA at bedside states he called EMS because the patient looked extremely pale and had a blood pressure of 77/60. Patient states that the blood pressure did not get better after he took all his medications. HHA states patient was not able to walk in the past few days and it was due to worsening weakness. His appetite was unchanged. He complains of recent coughing fits over the past few months and now he states that he has a bunch of mucus in his mouth but otherwise feels totally fine. Per HHA patient is now at baseline and the only difference is he is breathing a little heavier. Patients last bowel movement was 4 days ago which he states is standard for him and has no other complaints at this time.    In the ED:  Vitals: Temp 98.3 HR 94 /78 O2 Sat 96%  Labs: WBC 18 Hg 13.9 Plt 168 PTT 32 PT 16 INR 1.39 Lactate 3.3, Na 139 K 3.6 Cl 103 Bicarb 26 AG 10 Cr 1.75 Glucose 313Calcium 8.9   Imaging:  Intervention: Stroke Code called - deemed negative. Azithromycin 500, Ceftriaxone 1g, heparin 5000 q8, 1 L NS   (05 Aug 2022 03:20)      PAST MEDICAL & SURGICAL HISTORY:  DM2 (diabetes mellitus, type 2)  since 5/16/2017      Parkinsons      Hypertension      Hyperlipidemia      Depression      Melanoma      Depression      Tinnitus of left ear      Vertigo      No significant past surgical history          MEDICATIONS  (STANDING):  azithromycin   Tablet 500 milliGRAM(s) Oral every 24 hours  cefTRIAXone   IVPB 2000 milliGRAM(s) IV Intermittent every 24 hours  dextrose 5%. 1000 milliLiter(s) (50 mL/Hr) IV Continuous <Continuous>  dextrose 5%. 1000 milliLiter(s) (100 mL/Hr) IV Continuous <Continuous>  dextrose 50% Injectable 25 Gram(s) IV Push once  dextrose 50% Injectable 12.5 Gram(s) IV Push once  dextrose 50% Injectable 25 Gram(s) IV Push once  glucagon  Injectable 1 milliGRAM(s) IntraMuscular once  heparin   Injectable 5000 Unit(s) SubCutaneous every 8 hours  insulin lispro (ADMELOG) corrective regimen sliding scale   SubCutaneous three times a day before meals  LORazepam     Tablet 1 milliGRAM(s) Oral every 12 hours  polyethylene glycol 3350 17 Gram(s) Oral every 24 hours  senna 1 Tablet(s) Oral every 24 hours    MEDICATIONS  (PRN):  dextrose Oral Gel 15 Gram(s) Oral once PRN Blood Glucose LESS THAN 70 milliGRAM(s)/deciliter             FAMILY HISTORY:  Family history of stroke      CBC Full  -  ( 05 Aug 2022 05:30 )  WBC Count : 14.81 K/uL  RBC Count : 4.42 M/uL  Hemoglobin : 13.0 g/dL  Hematocrit : 38.4 %  Platelet Count - Automated : 137 K/uL  Mean Cell Volume : 86.9 fl  Mean Cell Hemoglobin : 29.4 pg  Mean Cell Hemoglobin Concentration : 33.9 gm/dL  Auto Neutrophil # : 13.37 K/uL  Auto Lymphocyte # : 1.17 K/uL  Auto Monocyte # : 0.27 K/uL  Auto Eosinophil # : 0.00 K/uL  Auto Basophil # : 0.00 K/uL  Auto Neutrophil % : 87.7 %  Auto Lymphocyte % : 7.9 %  Auto Monocyte % : 1.8 %  Auto Eosinophil % : 0.0 %  Auto Basophil % : 0.0 %      08-05    140  |  104  |  42<H>  ----------------------------<  228<H>  3.4<L>   |  24  |  1.02    Ca    9.1      05 Aug 2022 05:30  Phos  2.9     08-05  Mg     2.0     08-05    TPro  6.1  /  Alb  3.2<L>  /  TBili  0.4  /  DBili  0.2  /  AST  33  /  ALT  21  /  AlkPhos  64  08-05      Urinalysis Basic - ( 04 Aug 2022 16:18 )    Color: Yellow / Appearance: Clear / SG: <=1.005 / pH: x  Gluc: x / Ketone: NEGATIVE  / Bili: NEGATIVE / Urobili: 0.2 E.U./dL   Blood: x / Protein: Trace mg/dL / Nitrite: NEGATIVE   Leuk Esterase: NEGATIVE / RBC: < 5 /HPF / WBC < 5 /HPF   Sq Epi: x / Non Sq Epi: 0-5 /HPF / Bacteria: Present /HPF          Radiology :    < from: Xray Chest 1 View- PORTABLE-Urgent (Xray Chest 1 View- PORTABLE-Urgent .) (08.05.22 @ 03:01) >  ACC: [id number] EXAM:  XR CHEST PORTABLE URGENT 1V                          PROCEDURE DATE:  08/05/2022          INTERPRETATION:  Clinical history/reason for exam: Shortness of breath.    Frontal chest.    COMPARISON: August 4, 2022.    Findings/  impression: Bilateral opacities/left basilar discoid atelectasis. Heart   size within normal limits, thoracic aortic calcification. Stable bony   structures. Dextro scoliosis.      < from: CT Brain Stroke Protocol (08.04.22 @ 15:06) >    ACC: [id number] EXAM:  CT BRAIN STROKE PROTOCOL                          PROCEDURE DATE:  08/04/2022          INTERPRETATION:  Clinical history/reason for exam: Left facial droop,   stroke code.    Technique: Multiple contiguous axial CT images of the head were obtained   from the base of the skull to the vertex without the administration of   intravenous contrast. Coronal and sagittal reformatted images were   obtained.  RAPID AI was used for preliminary interpretation.      Comparison:CT head 3/9/2022    Findings:    The ventricles and sulci are prominent consistent with parenchymal volume   loss.    No evidence for intracranial hemorrhage, significant space occupying   process or recent territorial infarction.  No acute extra-axial fluid   collections are identified.    Gray-white matter differentiation is preserved. There are patchy foci of   periventricular and subcortical hypodensities consistent with moderate   chronic microvascular ischemic disease. .    There is no acute calvarial fracture. The native lenses have been   replaced..    The paranasal sinuses and bilateral mastoid complexes are well aerated.    Impression:    No acute intracranial hemorrhage, mass effect or large demarcated   territorial infarction..        < from: CT Brain Perfusion Maps Stroke (08.04.22 @ 15:27) >  ACC: [id number] EXAM:  CT BRAIN PERFUSION MAPS STROKE                          PROCEDURE DATE:  08/04/2022          INTERPRETATION:  PROCEDURE: CT Perfusion with intravenous contrast.    INDICATION: Stroke code, left facial droop    TECHNIQUE: Following the intravenous administration of 45 ml of Omnipaque   350, serial axial images were obtained through the brain. The CT   perfusion data set was post processed per iSchemaViewRAPID protocol   generating color maps of CBF, CBV, MTT, and Tmax.    COMPARISON: None    FINDINGS: The CBF <30% volume is 0 mL.  The Tmax > 6s volume is 22 mL   right temporal and occipital lobes.  The mismatch volume is 22 mL.    IMPRESSION: Elevated Tmax within the right temporal occipital region of   uncertain clinical significance.      < from: CT Angio Brain Stroke Protocol  w/ IV Cont (08.04.22 @ 15:29) >    ACC: [id number] EXAM:  CT ANGIO NECK STROKE PROTCL IC                        ACC: [id number] EXAM:  CT ANGIO BRAIN STROKE PROTC IC                          PROCEDURE DATE:  08/04/2022          INTERPRETATION:  PROCEDURES:  CTA brain with intravenous contrast.  CTA neck with intravenous contrast.    INDICATION: Stroke code, left facial numbness    TECHNIQUE: Multiple axial thin section were obtained from the aortic arch   through the neck and intracranial compartment up to the calvarial vertex.   Imaging is done after the IV bolus of 80 mL Omnipaque 350. MIP series are   provided.    COMPARISON: CT head of the same day, CTA had and neck 3/17/2016    FINDINGS:    INTRACRANIAL:  The internal carotid arteries are patent at the skull base and   intracranial compartment without occlusion or high grade stenosis. The   anterior and middle cerebral arteries are patent at their 1st and 2nd   order segments, and appear symmetric caliber. There is calcified plaque   of the intracranial bilateral V4 segments with moderate stenosis left V4   segment and mild stenosis of the right V4 segment. There is calcified   plaque of the basilar artery with mild multifocal stenosis. There is   contrast filling the bilateral posterior cerebral arteries without   high-grade stenosis. No site of aneurysm within the resolution   limitations of CTA.    EXTRACRANIAL:    There is calcified and noncalcified plaque of the aortic arch and the   origins of great vessels. There is a typical branching pattern off the   aortic arch.    Both common carotid arteries are patent to the bifurcations.    There is predominantly calcified plaque of the proximal left internal   carotid artery with mild stenosis per NASCET criteria.      There is predominantly calcified plaque of the proximal right internal   carotid artery resulting in moderate (approximately 50-55%) stenosis per   NASCET criteria.    Vertebral arteries are patent at their origins and throughout their   course in the neck.    There is a 3 cm soft tissue nodule in the right upper neck dorsal   subcutaneous soft tissues likely representing sebaceous cysts.    IMPRESSION:    Intracranial CTA: No large vessel occlusion. Mild to moderate stenosis of   the bilateral intracranial vertebral arteries (left greater than right).    Extracranial CTA: Moderate stenosis the proximal right ICA. Mild stenosis   the proximal left ICA.                  Vital Signs Last 24 Hrs  T(C): 37.3 (05 Aug 2022 05:44), Max: 38.6 (05 Aug 2022 04:52)  T(F): 99.2 (05 Aug 2022 05:44), Max: 101.4 (05 Aug 2022 04:52)  HR: 113 (05 Aug 2022 05:44) (94 - 129)  BP: 90/57 (05 Aug 2022 05:44) (90/57 - 156/96)  BP(mean): --  RR: 19 (05 Aug 2022 05:44) (18 - 19)  SpO2: 96% (05 Aug 2022 05:44) (92% - 96%)    Parameters below as of 05 Aug 2022 05:44  Patient On (Oxygen Delivery Method): room air            REVIEW OF SYSTEMS:  per hpi           Physical Exam:   lying in semi Pettit's position , awake , alert , NAD , pill rolling tremor noted    Neurologic Exam:    Alert and oriented to person , speech fluent w/o dysarthria , follows commands      Cranial Nerves:     II :                         pupils equal , round and reactive to light , visual fields intact   III/ IV/VI :              extraocular movements intact , neg nystagmus , neg ptosis  V :                        facial sensation intact , V1-3 normal  VII :                      face symmetric , no droop , normal eye closure and smile  VIII :                     hearing intact to finger rub bilaterally  IX and X :             no hoarseness , gag intact , palate/ uvula rise symmetrically  XI :                       SCM / trapezius strength intact bilateral  XII :                      no tongue deviation    Motor Exam:    Right UE:               no focal weakness ,  > 3+/5 throughout                                Left UE:                 no focal weakness,  > 3+/5 throughout        Right LE:                no focal weakness,  > 3+/5 throughout        Left LE:                  no focal weakness,  > 3+/5 throughout        Sensation:         intact to light touch x 4 extremities                                                  DTR :                     biceps/brachioradialis : equal                                              patella/ankle : equal                                            neg Babinski      Gait :  not tested              PM&R Impression : admitted for TME / sepsis PNA / WILL     1) deconditioned    2) no focal weakness    3) h/o Parkinsons       Recommendations / Plan :     1) Physical / Occupational therapy focusing on therapeutic exercises , bed mobility/transfer out of bed evaluation , progressive ambulation with assistive       devices prn .    2) Anticipated Disposition Plan / Recs  :    pending functional progress

## 2022-08-05 NOTE — PROGRESS NOTE ADULT - PROBLEM SELECTOR PLAN 7
F: s/p 1 L  E: replete as needed  N: NPO pending S+S eval  DVT ppx: heparin subq patient with hx of diabetes  blood glucose in 200s-300s  -monitor fingersticks  -c/w sliding scale

## 2022-08-05 NOTE — H&P ADULT - PROBLEM SELECTOR PLAN 5
patient with hx of parkinsons  seen with pill rolling tremor on exam  has hx of constipation 2/2 parkinsons  bm every 4 days - last one 4 days ago  -c/w bowel regimen   -pending formal med rec in AM

## 2022-08-05 NOTE — H&P ADULT - ASSESSMENT
83yo M with h/o Parkinsons, HTN, DM, HLD presents after the home health aid called EMS. The HHA at bedside states he called EMS because the patient looked extremely pale and hypotensive admitted for severe sepsis.

## 2022-08-05 NOTE — SWALLOW VFSS/MBS ASSESSMENT ADULT - DIAGNOSTIC IMPRESSIONS
Pt presents with mild-moderate oropharyngeal dysphagia. Deficits marked by slow, prolonged, and reduced mastication of solids, disorganized lingual movement, delayed A-P transit, reduced bolus control, delayed swallow trigger, and reduced pharyngeal swallow efficiency. The aforementioned deficits resulted in laryngeal penetration of all consistencies and pharyngeal residue (solids>puree>liquids). Pt benefited from a liquid wash to reduce residue.     *Of note, pt was unable to pass 13mm barium tablet into the oropharynx.      Dysphagia is likely chronic 2/2 PD diagnoses. Unknown if current PNA represents potential dysphagia-related sequela. No aspiration was visualized over the course of this study. However, given amount of pharyngeal residue with solids, it is possible that aspiration does occur throughout a meal as residue accumulates.     Pt is still at risk for aspiration and its negative sequela given reduced mobility and dependency on others for feeding assistance and oral hygiene.

## 2022-08-06 NOTE — PROGRESS NOTE ADULT - ASSESSMENT
83yo M with h/o Parkinsons, HTN, DM, HLD presents after the home health aid called EMS because the patient looked extremely pale and hypotensive. He was admitted for severe sepsis likely 2/2 CAP with possible right sided abdominal inflammation seen on CTA chest.

## 2022-08-06 NOTE — PROGRESS NOTE ADULT - SUBJECTIVE AND OBJECTIVE BOX
SUBJECTIVE/ OVERNIGHT EVENTS:  Mental status has improved.  Still having some abdominal pain.  Awaiting CTAP. NPO.     VITAL SIGNS:  Vital Signs Last 24 Hrs  T(C): 37.3 (05 Aug 2022 05:44), Max: 38.6 (05 Aug 2022 04:52)  T(F): 99.2 (05 Aug 2022 05:44), Max: 101.4 (05 Aug 2022 04:52)  HR: 113 (05 Aug 2022 05:44) (94 - 129)  BP: 90/57 (05 Aug 2022 05:44) (90/57 - 156/96)  BP(mean): --  RR: 19 (05 Aug 2022 05:44) (18 - 19)  SpO2: 96% (05 Aug 2022 05:44) (92% - 96%)    Parameters below as of 05 Aug 2022 05:44  Patient On (Oxygen Delivery Method): room air        PHYSICAL EXAM:  General: NAD; has pill-rolling resting tremor; speech is slow   HEENT: NC/AT; PERRL; EOMI  Cardiac: RRR; +S1/S2  Pulm: CTA B/L; no W/R/R  GI: Distended, RLQ tenderness to palpation with rebound tenderness and guarding; +BS  Extremities: warm and well perfused; pitting edema in Lower Extrs b/l worse in Right LE  Vasc: 2+ radial, DP pulses B/L  Neuro: AAOx2; CN II-XII intact; 2-3/5 motor strength in Lower extr b/l, 4/5 in Upper extr b/l, no sensory loss or focal deficits; reflexes 2+ b/l    MEDICATIONS:  MEDICATIONS  (STANDING):  azithromycin   Tablet 500 milliGRAM(s) Oral every 24 hours  cefTRIAXone   IVPB 2000 milliGRAM(s) IV Intermittent every 24 hours  dextrose 5%. 1000 milliLiter(s) (50 mL/Hr) IV Continuous <Continuous>  dextrose 5%. 1000 milliLiter(s) (100 mL/Hr) IV Continuous <Continuous>  dextrose 50% Injectable 25 Gram(s) IV Push once  dextrose 50% Injectable 12.5 Gram(s) IV Push once  dextrose 50% Injectable 25 Gram(s) IV Push once  glucagon  Injectable 1 milliGRAM(s) IntraMuscular once  heparin   Injectable 5000 Unit(s) SubCutaneous every 8 hours  insulin lispro (ADMELOG) corrective regimen sliding scale   SubCutaneous three times a day before meals  LORazepam     Tablet 1 milliGRAM(s) Oral every 12 hours  polyethylene glycol 3350 17 Gram(s) Oral every 24 hours  senna 1 Tablet(s) Oral every 24 hours    MEDICATIONS  (PRN):  dextrose Oral Gel 15 Gram(s) Oral once PRN Blood Glucose LESS THAN 70 milliGRAM(s)/deciliter      ALLERGIES:  Allergies    No Known Allergies    Intolerances        LABS:                        13.0   14.81 )-----------( 137      ( 05 Aug 2022 05:30 )             38.4     08-05    140  |  104  |  42<H>  ----------------------------<  228<H>  3.4<L>   |  24  |  1.02    Ca    9.1      05 Aug 2022 05:30  Phos  2.9     08-05  Mg     2.0     08-05    TPro  6.1  /  Alb  3.2<L>  /  TBili  0.4  /  DBili  0.2  /  AST  33  /  ALT  21  /  AlkPhos  64  08-05    PT/INR - ( 04 Aug 2022 14:58 )   PT: 16.3 sec;   INR: 1.39          PTT - ( 04 Aug 2022 14:58 )  PTT:32.8 sec    RADIOLOGY & ADDITIONAL TESTS: Reviewed.

## 2022-08-06 NOTE — PROGRESS NOTE ADULT - PROBLEM SELECTOR PLAN 4
Had right abdominal tenderness, rebound and guarding, CTPE found evidence of probable acalculous cholecystitis, less likely right-sided acute diverticulitis  - see above problem and plan

## 2022-08-06 NOTE — PROVIDER CONTACT NOTE (CHANGE IN STATUS NOTIFICATION) - SITUATION
Pt rectal temp 102.2
Pt temp is 100.4 after IV tylenol infusion
Pt temperature is 100.8 orally and 101.4 rectally

## 2022-08-06 NOTE — PROGRESS NOTE ADULT - PROBLEM SELECTOR PLAN 6
patient with hx of parkinsons  seen with pill rolling tremor on exam  has hx of constipation 2/2 parkinsons  bm every 4 days - last one 4 days ago  -c/w bowel regimen  - start home levodopa/carbidopa (0.5 tabs 4x daily, PO)

## 2022-08-06 NOTE — PROGRESS NOTE ADULT - PROBLEM SELECTOR PLAN 1
patient with severe sepsis on presentation: hypotensive, tachycardic, leukocytosis, tachypnea and positive lactate, febrile to 101 rectally on arrival  given 1 L NS in ED  Possible source -   1. PNA - Less likely with a CT chest showing only atelectasis.   - CXR with poor inspiratory effort (8/4), Bilateral opacities/left basilar discoid atelectasis (8/5)  -c/w ceftriaxone (2000 mg q24h). Can d/c Azithro  -S+S recs: suggest Soft and bite sized solids + thin liquids, strict aspiration precautions, f/u VFSS/MBS  -suctioning prn  -f/u bcx ucx  -f/u Legionella antigen results  - CTPE found no PE and f/u D-dimer- rule out PE (tachycardic, tachypneic, febrile, in bed)    2. Possible acalculous judy + possible right sided diverticulitis (Had right abdominal tenderness, rebound and guarding- CTPE: No PE. Found evidence of extensive inflammation in right upper quadrant, probable acalculous cholecystitis, less likely right-sided acute diverticulitis)  - f/u CT abd + pelv with IV contrast  - NPO until perforation ruled out  - Add metronidazole (500 mg q8h)

## 2022-08-06 NOTE — PROGRESS NOTE ADULT - PROBLEM SELECTOR PLAN 2
patient with some confusion from baseline per HHA - improved, likely 2/2 infection  stroke code called at [facility] - per telestroke doctor was negative however imaging showed: Mild to moderate stenosis of the bilateral intracranial vertebral arteries (left greater than right). Moderate stenosis the proximal right ICA. Mild stenosis the proximal left ICA.  patient takes ativan q4 at home  due to level of secretions will hold off on further ativan now. no signs of withdrawal at this time - tremor is pill rolling   -[facility] protocol   -stroke consulted:   - f/u neuro recs: MR brain w/o contrast, Carotid US b/l to confirm degree of stenosis, TTE w/ bubble, Start ASA 81mg + Plavix 75mg for 3 weeks followed by ASA 81mg indefinitely , Lipid panel, A1c, Atorvastatin 40mg per ASCVD risk  -c/w above plan

## 2022-08-06 NOTE — CONSULT NOTE ADULT - ASSESSMENT
82M PMH Parkinson's, HTN, HLD, DM BIBEMs from home after HHA noted he was pale and weak, admitted for sepsis w/ suspected CAP (now unlikely source) and stroke code called but negative w/ moderate R ICA stenosis started on ASA/plavix and continued sepsis w/u. General surgery consulted for finding of hepatic flexure colitis on CT scan along w/ abdominal pain on exam.     Recommendations:   - RUQ US to r/o acalculous cholecystitis given CT findings  - NPO w/ IVF resuscitation (at least maintenance rate)  - monitor UOP - *nothing is currently recorded in flow sheets ***  - monitor BMs  - continue IV abx cef/flagyl, appropriate  - downtrending fever curve now afebrile through day and downtrending WBC noted  - abdominal exam improved from what was reported, continue exams  - stool studies  - GI consult for colitis vs possible colon malignancy workup  - CEA w/ AM labs  - enemas for constipation  - continue bowel regiment  - seen and examined in room  - discussed w/ attending  - surgery team 4c following

## 2022-08-06 NOTE — CONSULT NOTE ADULT - SUBJECTIVE AND OBJECTIVE BOX
Surgery Consult      Consulting surgical team: [ ] 1 - colorectal/surg onc   [ ] 2 - MIS/bariatrics   [x] 4 - acute care   [ ] 5 - general surgery/breast/pediatrics  Consulting attending: Dr. Machuca      HPI: 82M PMH Parkinson's, HTN, HLD, DM BIBEMs from home after HHA noted he was pale and weak, admitted for sepsis w/ suspected CAP (now unlikely source) and stroke code called but negative w/ moderate R ICA stenosis started on ASA/plavix and continued sepsis w/u. General surgery consulted for finding of hepatic flexure colitis on CT scan along w/ abdominal pain on exam. Patient denies abdominal pain and tolerates exam. Denies any nausea, emesis. Reports feeling hungry. Denies prior cscope, but reports normal EGD years ago. Reports last BM 2 d ago, but seems to be poor historian and x&ox1. Of note, patient was seen by surgical service in June 2019 at which time he had possible acalculous cholecystitis with no filling of GB on HIDA, but sx resolved.   All: NKDA  Orders: ASA, plavix, SQH, cef/flagyl, LR@60, lipitor, carbidopa/levodopa, ativan, miralax, senna, NPO    VS: last fever MN last night 101.4, since AF; -109 throughout day, BP stable 105-127/60-70s, RR 20 on 2L NC satting 92%    Exam  Gen: well-appearing, conversational   Neuro: A&Ox1, no focal deficits, CNII-XII intact  Abdo: soft, nondistended, nontender to deep palpation including RUQ and epigastrum, no scars  Extrem: WWP, nonedematous    Labs: WBC 10.8 from 14 yesterday and 18 on arrival, H/H 12/36, Cr 0.78; blood sugars 150-200, A1c 6.9; Lactate normalized from 3.3 on arrival 8/4    Imaging:   - CT today with marked wall thickening ascending colon and hepatic flexure, underlying significant colitis w/ 3.5x1.8cm questionable phlegmon vs abscess, distended gallbladder with slight wall thickening, likely reactive; 2/3 duodenum thickening, rectosigmoid moderate stool c/w constipation  - CT chest 8/5 w/ distended acalculous GB w/ thickened wall and hepatic flexure inflammation likely acute acalculous cholecystitis  - RUQ US pending

## 2022-08-07 NOTE — PROGRESS NOTE ADULT - SUBJECTIVE AND OBJECTIVE BOX
SUBJECTIVE: Patient seen and examined bedside by surgical team. Patient denies pain currently. Denies cp/sob/n/v.     aspirin  chewable 81 milliGRAM(s) Oral daily  cefTRIAXone   IVPB 2000 milliGRAM(s) IV Intermittent every 24 hours  clopidogrel Tablet 75 milliGRAM(s) Oral daily  heparin   Injectable 5000 Unit(s) SubCutaneous every 8 hours  metroNIDAZOLE  IVPB      metroNIDAZOLE  IVPB 500 milliGRAM(s) IV Intermittent every 8 hours      Vital Signs Last 24 Hrs  T(C): 36.9 (07 Aug 2022 14:14), Max: 36.9 (07 Aug 2022 14:14)  T(F): 98.4 (07 Aug 2022 14:14), Max: 98.4 (07 Aug 2022 14:14)  HR: 106 (07 Aug 2022 14:14) (104 - 111)  BP: 109/72 (07 Aug 2022 14:14) (109/72 - 126/72)  BP(mean): --  RR: 16 (07 Aug 2022 14:14) (16 - 19)  SpO2: 98% (07 Aug 2022 14:14) (93% - 98%)    Parameters below as of 07 Aug 2022 14:14  Patient On (Oxygen Delivery Method): nasal cannula  O2 Flow (L/min): 2    I&O's Detail    06 Aug 2022 07:01  -  07 Aug 2022 07:00  --------------------------------------------------------  IN:    sodium chloride 0.9%: 780 mL  Total IN: 780 mL    OUT:    Voided (mL): 350 mL  Total OUT: 350 mL    Total NET: 430 mL          General: NAD, resting comfortably in bed  C/V: NSR  Pulm: Nonlabored breathing, no respiratory distress  Abd: soft, non tender in all quadrants, mildly distended. No guarding or rigidity.   Extrem: WWP, no edema, SCDs in place        LABS:                        12.2   10.04 )-----------( 133      ( 07 Aug 2022 05:30 )             37.1     08-07    145  |  109<H>  |  27<H>  ----------------------------<  156<H>  3.4<L>   |  24  |  0.66    Ca    8.7      07 Aug 2022 05:30  Phos  2.3     08-07  Mg     2.2     08-07    TPro  5.8<L>  /  Alb  2.9<L>  /  TBili  0.3  /  DBili  x   /  AST  38  /  ALT  18  /  AlkPhos  83  08-07          RADIOLOGY & ADDITIONAL STUDIES:

## 2022-08-07 NOTE — PROGRESS NOTE ADULT - PROBLEM SELECTOR PLAN 8
F: s/p 1 L  E: replete as needed  N: NPO pending S+S eval  DVT ppx: heparin subq F: s/p 1 L  E: replete as needed  N: Soft diet   DVT ppx: heparin subq

## 2022-08-07 NOTE — PROGRESS NOTE ADULT - SUBJECTIVE AND OBJECTIVE BOX
O/N Events: None. Abdominal exam benign.     Subjective/ROS: Patient seen and examined at bedside.     Denies Fever/Chills, HA, CP, SOB, n/v, changes in bowel/urinary habits.  12pt ROS otherwise negative.    VITALS  Vital Signs Last 24 Hrs  T(C): 36.6 (07 Aug 2022 06:36), Max: 37.2 (06 Aug 2022 14:24)  T(F): 97.9 (07 Aug 2022 06:36), Max: 98.9 (06 Aug 2022 14:24)  HR: 111 (07 Aug 2022 06:36) (102 - 111)  BP: 113/77 (07 Aug 2022 06:36) (110/66 - 126/72)  BP(mean): --  RR: 17 (07 Aug 2022 06:36) (17 - 19)  SpO2: 93% (07 Aug 2022 06:36) (93% - 96%)    Parameters below as of 06 Aug 2022 20:54  Patient On (Oxygen Delivery Method): nasal cannula  O2 Flow (L/min): 2      CAPILLARY BLOOD GLUCOSE      POCT Blood Glucose.: 165 mg/dL (07 Aug 2022 08:09)  POCT Blood Glucose.: 138 mg/dL (06 Aug 2022 22:01)  POCT Blood Glucose.: 128 mg/dL (06 Aug 2022 17:15)      PHYSICAL EXAM  General: NAD  Head: NC/AT; MMM;  Neck: Supple; no JVD  Respiratory: CTAB; no wheezes/rales/rhonchi  Cardiovascular: Regular rhythm/rate; S1/S2+, no murmurs, rubs gallops   Gastrointestinal: Tense and Distended; bowel sounds normal and present, non-tender to palpation   Extremities: WWP; no edema/cyanosis  Neurological: A&Ox3; no obvious focal deficits    MEDICATIONS  (STANDING):  aspirin  chewable 81 milliGRAM(s) Oral daily  atorvastatin 40 milliGRAM(s) Oral at bedtime  carbidopa/levodopa  25/250 0.5 Tablet(s) Oral four times a day  cefTRIAXone   IVPB 2000 milliGRAM(s) IV Intermittent every 24 hours  clopidogrel Tablet 75 milliGRAM(s) Oral daily  dextrose 5%. 1000 milliLiter(s) (100 mL/Hr) IV Continuous <Continuous>  dextrose 5%. 1000 milliLiter(s) (50 mL/Hr) IV Continuous <Continuous>  dextrose 50% Injectable 25 Gram(s) IV Push once  dextrose 50% Injectable 12.5 Gram(s) IV Push once  dextrose 50% Injectable 25 Gram(s) IV Push once  glucagon  Injectable 1 milliGRAM(s) IntraMuscular once  heparin   Injectable 5000 Unit(s) SubCutaneous every 8 hours  insulin lispro (ADMELOG) corrective regimen sliding scale   SubCutaneous Before meals and at bedtime  lactated ringers. 1000 milliLiter(s) (60 mL/Hr) IV Continuous <Continuous>  LORazepam     Tablet 1 milliGRAM(s) Oral every 12 hours  metroNIDAZOLE  IVPB      metroNIDAZOLE  IVPB 500 milliGRAM(s) IV Intermittent every 8 hours  polyethylene glycol 3350 17 Gram(s) Oral every 24 hours  senna 1 Tablet(s) Oral every 24 hours    MEDICATIONS  (PRN):  dextrose Oral Gel 15 Gram(s) Oral once PRN Blood Glucose LESS THAN 70 milliGRAM(s)/deciliter      No Known Allergies      LABS                        12.2   10.04 )-----------( 133      ( 07 Aug 2022 05:30 )             37.1     08-07    145  |  109<H>  |  27<H>  ----------------------------<  156<H>  3.4<L>   |  24  |  0.66    Ca    8.7      07 Aug 2022 05:30  Phos  2.3     08-07  Mg     2.2     08-07    TPro  5.8<L>  /  Alb  2.9<L>  /  TBili  0.3  /  DBili  x   /  AST  38  /  ALT  18  /  AlkPhos  83  08-07        IMAGING/EKG/ETC  Reviewed.

## 2022-08-07 NOTE — PROGRESS NOTE ADULT - ASSESSMENT
83yo M with h/o Parkinsons, HTN, DM, HLD presents after the home health aid called EMS because the patient looked extremely pale and hypotensive. He was admitted for severe sepsis likely 2/2 CAP with possible right sided abdominal inflammation seen on CTA chest. Abdominal imaging revealed evidence of Colitis and Cholecystitis. Patient is being treated with CTX and Flagyl and GI and Surgery are following.

## 2022-08-07 NOTE — PROGRESS NOTE ADULT - PROBLEM SELECTOR PLAN 6
patient with hx of parkinsons  seen with pill rolling tremor on exam  has hx of constipation 2/2 parkinsons  bm every 4 days - last one 4 days ago  -c/w bowel regimen  - start home levodopa/carbidopa (0.5 tabs 4x daily, PO) patient with hx of parkinsons  seen with pill rolling tremor on exam  has hx of constipation 2/2 parkinsons  bm every 4 days - last one 4 days ago  -c/w bowel regimen  - started on home levodopa/carbidopa (0.5 tabs 4x daily, PO)

## 2022-08-07 NOTE — PROGRESS NOTE ADULT - PROBLEM SELECTOR PLAN 2
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 Moderate stenosis the proximal right ICA. Mild stenosis the proximal left ICA.  patient takes ativan q4 at home  due to level of secretions will hold off on further ativan now. no signs of withdrawal at this time - tremor is pill rolling   -[facility] protocol   -stroke consulted:   - f/u neuro recs: MR brain w/o contrast, Carotid US b/l to confirm degree of stenosis, TTE w/ bubble, Start ASA 81mg + Plavix 75mg for 3 weeks followed by ASA 81mg indefinitely , Lipid panel, A1c, Atorvastatin 40mg per ASCVD risk  -c/w above plan

## 2022-08-07 NOTE — PROGRESS NOTE ADULT - PROBLEM SELECTOR PLAN 1
patient with severe sepsis on presentation: hypotensive, tachycardic, leukocytosis, tachypnea and positive lactate, febrile to 101 rectally on arrival  given 1 L NS in ED  Possible source -   1. PNA - Less likely with a CT chest showing only atelectasis.   - CXR with poor inspiratory effort (8/4), Bilateral opacities/left basilar discoid atelectasis (8/5)  -c/w ceftriaxone (2000 mg q24h). Can d/c Azithro  -S+S recs: suggest Soft and bite sized solids + thin liquids, strict aspiration precautions, f/u VFSS/MBS  -suctioning prn  -f/u bcx ucx  -f/u Legionella antigen results  - CTPE found no PE and f/u D-dimer- rule out PE (tachycardic, tachypneic, febrile, in bed)    2. Possible acalculous judy + possible right sided diverticulitis (Had right abdominal tenderness, rebound and guarding- CTPE: No PE. Found evidence of extensive inflammation in right upper quadrant, probable acalculous cholecystitis, less likely right-sided acute diverticulitis)  - f/u CT abd + pelv with IV contrast  - NPO until perforation ruled out  - Add metronidazole (500 mg q8h) patient with severe sepsis on presentation: hypotensive, tachycardic, leukocytosis, tachypnea and positive lactate, febrile to 101 rectally on arrival  given 1 L NS in ED  Possible source -   1. PNA - Less likely with a CT chest showing only atelectasis.   - CXR with poor inspiratory effort (8/4), Bilateral opacities/left basilar discoid atelectasis (8/5)  -c/w ceftriaxone (2000 mg q24h)  - patient NPO   -f/u bcx ucx - negative to date   -f/u Legionella antigen results  - CTPE found no PE and f/u D-dimer- rule out PE (tachycardic, tachypneic, febrile, in bed)    2. Possible acalculous judy + possible right sided diverticulitis (Had right abdominal tenderness, rebound and guarding- CTPE: No PE. Found evidence of extensive inflammation in right upper quadrant, probable acalculous cholecystitis, less likely right-sided acute diverticulitis)  - CT - evidence of colitis   - f/u GI pcr   - US - evidence of cholecystitis   - NPO until perforation ruled out  - follow GI recs   - Metronidazole added (500 mg q8h) patient with severe sepsis on presentation: hypotensive, tachycardic, leukocytosis, tachypnea and positive lactate, febrile to 101 rectally on arrival  given 1 L NS in ED  Possible source -   1. PNA - Less likely with a CT chest showing only atelectasis.   - CXR with poor inspiratory effort (8/4), Bilateral opacities/left basilar discoid atelectasis (8/5)  -c/w ceftriaxone (2000 mg q24h)  - patient started on soft diet per GI recs   -f/u bcx ucx - negative to date   -f/u Legionella antigen results  - CTPE found no PE and f/u D-dimer- rule out PE (tachycardic, tachypneic, febrile, in bed)    2. Possible acalculous judy + possible right sided diverticulitis (Had right abdominal tenderness, rebound and guarding- CTPE: No PE. Found evidence of extensive inflammation in right upper quadrant, probable acalculous cholecystitis, less likely right-sided acute diverticulitis)  - CT - evidence of colitis   - f/u GI pcr   - US - evidence of cholecystitis   - NPO until perforation ruled out  - follow GI recs   - Metronidazole added (500 mg q8h)

## 2022-08-07 NOTE — PROGRESS NOTE ADULT - ASSESSMENT
82M PMH Parkinson's, HTN, HLD, DM BIBEMs from home after HHA noted he was pale and weak, admitted for sepsis w/ suspected CAP (now unlikely source) and stroke code called but negative w/ moderate R ICA stenosis started on ASA/plavix and continued sepsis w/u. General surgery consulted for finding of hepatic flexure colitis on CT scan along w/ abdominal pain on exam.     Recommendations:   - Continue soft and bite sized diet  - monitor UOP  - monitor BMs  - continue IV abx cef/flagyl, appropriate  - downtrending fever curve now afebrile through day and downtrending WBC noted  - abdominal exam improved from what was reported, continue exams  - stool studies  - GI consult for colitis vs possible colon malignancy workup  - CEA w/ AM labs  - enemas for constipation  - continue bowel regimen

## 2022-08-08 NOTE — PROGRESS NOTE ADULT - SUBJECTIVE AND OBJECTIVE BOX
Neurology Stroke Progress Note    INTERVAL HPI/OVERNIGHT EVENTS:  Patient seen and examined. MRI negative for acute infarct.     MEDICATIONS  (STANDING):  aspirin  chewable 81 milliGRAM(s) Oral daily  atorvastatin 40 milliGRAM(s) Oral at bedtime  carbidopa/levodopa  25/250 0.5 Tablet(s) Oral four times a day  cefTRIAXone   IVPB 2000 milliGRAM(s) IV Intermittent every 24 hours  clopidogrel Tablet 75 milliGRAM(s) Oral daily  dextrose 5%. 1000 milliLiter(s) (100 mL/Hr) IV Continuous <Continuous>  dextrose 5%. 1000 milliLiter(s) (50 mL/Hr) IV Continuous <Continuous>  dextrose 50% Injectable 25 Gram(s) IV Push once  dextrose 50% Injectable 12.5 Gram(s) IV Push once  dextrose 50% Injectable 25 Gram(s) IV Push once  glucagon  Injectable 1 milliGRAM(s) IntraMuscular once  heparin   Injectable 5000 Unit(s) SubCutaneous every 8 hours  insulin lispro (ADMELOG) corrective regimen sliding scale   SubCutaneous Before meals and at bedtime  lactated ringers. 1000 milliLiter(s) (100 mL/Hr) IV Continuous <Continuous>  LORazepam     Tablet 1 milliGRAM(s) Oral every 12 hours  metroNIDAZOLE  IVPB      metroNIDAZOLE  IVPB 500 milliGRAM(s) IV Intermittent every 8 hours  polyethylene glycol 3350 17 Gram(s) Oral every 24 hours  senna 1 Tablet(s) Oral every 24 hours    MEDICATIONS  (PRN):  dextrose Oral Gel 15 Gram(s) Oral once PRN Blood Glucose LESS THAN 70 milliGRAM(s)/deciliter      Allergies    No Known Allergies    Intolerances    Vital Signs Last 24 Hrs  T(C): 37.3 (08 Aug 2022 13:51), Max: 37.3 (08 Aug 2022 13:51)  T(F): 99.1 (08 Aug 2022 13:51), Max: 99.1 (08 Aug 2022 13:51)  HR: 100 (08 Aug 2022 13:51) (100 - 109)  BP: 134/77 (08 Aug 2022 13:51) (115/75 - 144/90)  BP(mean): --  RR: 18 (08 Aug 2022 13:51) (17 - 18)  SpO2: 91% (08 Aug 2022 13:51) (91% - 94%)    Parameters below as of 08 Aug 2022 13:51  Patient On (Oxygen Delivery Method): nasal cannula  O2 Flow (L/min): 2      Physical exam:  General: No acute distress, awake and alert  Eyes: Anicteric sclerae, moist conjunctivae, see below for CNs  Neck: trachea midline  Pulmonary:. No use of accessory muscles  GI: Abdomen soft, non-distended, non-tender  Extremities: no edema    Neurologic:  -Mental status: Awake, alert, oriented to person, place. Able to follow commands.   II: Visual fields are full to confrontation.  III, IV, VI: Extraocular movements are intact without nystagmus. Pupils equally round and reactive to light  V:  Facial sensation V1-V3 equal and intact   VII: Left facial asymmetry present  Motor: Normal bulk and tone. No pronator drift. Bilateral upper extremities at least 3/5. Bilateral lower extremities able to move within plane of bed but unable to lift antigravity.   Sensation: Intact to light touch bilaterally.    LABS:                        11.9   9.76  )-----------( 144      ( 08 Aug 2022 08:46 )             36.5     08-08    144  |  108  |  23  ----------------------------<  187<H>  3.0<L>   |  25  |  0.57    Ca    8.4      08 Aug 2022 08:46  Phos  2.4     08-08  Mg     2.0     08-08    TPro  5.8<L>  /  Alb  2.6<L>  /  TBili  0.3  /  DBili  x   /  AST  31  /  ALT  <5<L>  /  AlkPhos  83  08-08    RADIOLOGY & ADDITIONAL TESTS:    < from: MR Head No Cont (08.06.22 @ 12:34) >  IMPRESSION:    Negative for recent infarct.    < end of copied text >

## 2022-08-08 NOTE — PROGRESS NOTE ADULT - SUBJECTIVE AND OBJECTIVE BOX
Physical Medicine and Rehabilitation Progress Note :    Patient is a 82y old  Male who presents with a chief complaint of weakness (08 Aug 2022 09:32)      HPI:  83yo M with h/o Parkinsons, HTN, DM, HLD presents after the home health aid called EMS. The HHA at bedside states he called EMS because the patient looked extremely pale and had a blood pressure of 77/60. Patient states that the blood pressure did not get better after he took all his medications. HHA states patient was not able to walk in the past few days and it was due to worsening weakness. His appetite was unchanged. He complains of recent coughing fits over the past few months and now he states that he has a bunch of mucus in his mouth but otherwise feels totally fine. Per HHA patient is now at baseline and the only difference is he is breathing a little heavier. Patients last bowel movement was 4 days ago which he states is standard for him and has no other complaints at this time.    In the ED:  Vitals: Temp 98.3 HR 94 /78 O2 Sat 96%  Labs: WBC 18 Hg 13.9 Plt 168 PTT 32 PT 16 INR 1.39 Lactate 3.3, Na 139 K 3.6 Cl 103 Bicarb 26 AG 10 Cr 1.75 Glucose 313Calcium 8.9   Imaging:  Intervention: Stroke Code called - deemed negative. Azithromycin 500, Ceftriaxone 1g, heparin 5000 q8, 1 L NS   (05 Aug 2022 03:20)                            11.9   9.76  )-----------( 144      ( 08 Aug 2022 08:46 )             36.5       08-08    144  |  108  |  23  ----------------------------<  187<H>  3.0<L>   |  25  |  0.57    Ca    8.4      08 Aug 2022 08:46  Phos  2.4     08-08  Mg     2.0     08-08    TPro  5.8<L>  /  Alb  2.6<L>  /  TBili  0.3  /  DBili  x   /  AST  31  /  ALT  <5<L>  /  AlkPhos  83  08-08    Vital Signs Last 24 Hrs  T(C): 36.5 (08 Aug 2022 06:08), Max: 36.9 (07 Aug 2022 14:14)  T(F): 97.7 (08 Aug 2022 06:08), Max: 98.4 (07 Aug 2022 14:14)  HR: 109 (08 Aug 2022 06:08) (106 - 109)  BP: 144/90 (08 Aug 2022 06:08) (109/72 - 144/90)  BP(mean): --  RR: 17 (08 Aug 2022 06:08) (16 - 18)  SpO2: 94% (08 Aug 2022 06:08) (94% - 98%)    Parameters below as of 07 Aug 2022 20:52  Patient On (Oxygen Delivery Method): nasal cannula  O2 Flow (L/min): 2      MEDICATIONS  (STANDING):  aspirin  chewable 81 milliGRAM(s) Oral daily  atorvastatin 40 milliGRAM(s) Oral at bedtime  carbidopa/levodopa  25/250 0.5 Tablet(s) Oral four times a day  cefTRIAXone   IVPB 2000 milliGRAM(s) IV Intermittent every 24 hours  clopidogrel Tablet 75 milliGRAM(s) Oral daily  dextrose 5%. 1000 milliLiter(s) (100 mL/Hr) IV Continuous <Continuous>  dextrose 5%. 1000 milliLiter(s) (50 mL/Hr) IV Continuous <Continuous>  dextrose 50% Injectable 25 Gram(s) IV Push once  dextrose 50% Injectable 12.5 Gram(s) IV Push once  dextrose 50% Injectable 25 Gram(s) IV Push once  glucagon  Injectable 1 milliGRAM(s) IntraMuscular once  heparin   Injectable 5000 Unit(s) SubCutaneous every 8 hours  insulin lispro (ADMELOG) corrective regimen sliding scale   SubCutaneous Before meals and at bedtime  lactated ringers. 1000 milliLiter(s) (100 mL/Hr) IV Continuous <Continuous>  LORazepam     Tablet 1 milliGRAM(s) Oral every 12 hours  metroNIDAZOLE  IVPB      metroNIDAZOLE  IVPB 500 milliGRAM(s) IV Intermittent every 8 hours  polyethylene glycol 3350 17 Gram(s) Oral every 24 hours  senna 1 Tablet(s) Oral every 24 hours    MEDICATIONS  (PRN):  dextrose Oral Gel 15 Gram(s) Oral once PRN Blood Glucose LESS THAN 70 milliGRAM(s)/deciliter    Currently Undergoing Physical/ Occupational Therapy at bedside    Initial PT Functional Status Assessment :       Previous Level of Function:     · Ambulation Skills	needs device and assist  · Transfer Skills	needs device and assist  · ADL Skills	needs device and assist  · Work/Leisure Activity	needs device and assist  · Additional Comments	Patient demonstrated increased fatigue and lethargy, and unable to provide much background information due to fatigue. However, HHA present at bedside and able to provide PLOF on behalf of patient. Per discussion with HHA, patient required min-A with STS transfers, CGA with gait with RW, and was only able to tolerate ambulating within the household. Patient receives assistance with meal-prepping, household cleaning, bathing/toileting, and sometimes feeding (when patient has increased tremors). Patient has 24/7 HHA and RW at home.    Cognitive Status Examination:   · Orientation	person; place; situation  · Level of Consciousness	lethargic/somnolent  · Follows Commands and Answers Questions	75% of the time; able to follow single-step instructions    Range of Motion Exam:   · Active Range of Motion Examination	bilateral  lower extremity Active ROM was WFL (within functional limits); bilateral upper extremity Active ROM was WFL (within functional limits)    Manual Muscle Testing:   · Manual Muscle Testing Results	grossly assessed due to  functional mobility observed against gravity: 3/5 for BUEs; 3/5 for BLEs    Bed Mobility: Rolling/Turning:     · Level of [place]	moderate assist (50% patients effort)  · Physical Assist/Nonphysical Assist	1 person assist; verbal cues  · Assistive Device	bed rails    Bed Mobility: Scooting/Bridging:     · Level of [place]	maximum assist (25% patients effort)  · Physical Assist/Nonphysical Assist	1 person assist; verbal cues  · Assistive Device	bed rails    Bed Mobility: Sit to Supine:     · Level of [place]	moderate assist (50% patients effort)  · Physical Assist/Nonphysical Assist	1 person assist; verbal cues  · Assistive Device	bed rails    Bed Mobility: Supine to Sit:     · Level of [place]	moderate assist (50% patients effort)  · Physical Assist/Nonphysical Assist	1 person assist; verbal cues  · Assistive Device	bed rails    Bed Mobility Analysis:     · Bed Mobility Limitations	decreased ability to use legs for bridging/pushing; decreased ability to use arms for pushing/pulling  · Impairments Contributing to Impaired Bed Mobility	impaired balance; decreased strength; reduced functional endurance    Transfer: Sit to Stand:     · Level of [place]	moderate assist (50% patients effort)  · Physical Assist/Nonphysical Assist	2 person assist; verbal cues; set-up required  · Weight-Bearing Restrictions	full weight-bearing  · Assistive Device	rolling walker    Transfer: Stand to Sit:     · Level of [place]	moderate assist (50% patients effort)  · Physical Assist/Nonphysical Assist	2 person assist; verbal cues; set-up required  · Weight-Bearing Restrictions	full weight-bearing  · Assistive Device	rolling walker    Sit/Stand Transfer Safety Analysis:     · Transfer Safety Concerns Noted	decreased weight-shifting ability; decreased step length  · Impairments Contributing to Impaired Transfers	impaired balance; decreased strength; reduced functional endurance    Gait Skills:     · Level of [place]	moderate assist (50% patients effort)  · Physical Assist/Nonphysical Assist	2 person assist; verbal cues; set-up required  · Weight-Bearing Restrictions	full weight-bearing  · Assistive Device	rolling walker  · Gait Distance	4 steps at EOB    Gait Analysis:     · Gait Pattern Used	3-point gait  · Gait Deviations Noted	decreased alejandro; decreased weight-shifting ability  · Impairments Contributing to Gait Deviations	impaired balance; decreased strength; reduced functional endurance    Balance Skills Assessment:     · Sitting Balance: Static	fair balance  · Sitting Balance: Dynamic	fair minus  · Sit-to-Stand Balance	fair minus  · Standing Balance: Static	poor plus  · Standing Balance: Dynamic	poor plus  · Systems Impairment Contributing to Balance Disturbance	musculoskeletal  · Identified Impairments Contributing to Balance Disturbance	decreased strength; reduced functional endurance    Clinical Impressions:   · Criteria for Skilled Therapeutic Interventions	impairments found; anticipated discharge recommendation; functional limitations in following categories; risk reduction/prevention; rehab potential; therapy frequency; predicted duration of therapy intervention  · Impairments Found (describe specific impairments)	aerobic capacity/endurance; gait, locomotion, and balance; muscle strength  · Functional Limitations in Following Categories (describe specific limitations)	self-care; home management; community/leisure    · Risk Reduction/Prevention (Describe Specific Areas of risk reduction/prevention)	risk factors  · Risk Areas	fall  · Rehab Potential	good, to achieve stated therapy goals  · Therapy Frequency	2-3x/week        PM&R Impression : as above    Current Disposition Plan Recommendations :    d/c home, home PT, continue 24/7 home care

## 2022-08-08 NOTE — PROGRESS NOTE ADULT - ASSESSMENT
per Internal Medicine    82 y o M with h/o Parkinsons, HTN, DM, HLD presents after the home health aid called EMS because the patient looked extremely pale and hypotensive. He was admitted for severe sepsis likely 2/2 CAP with possible right sided abdominal inflammation seen on CTA chest. Abdominal imaging revealed evidence of Colitis and Cholecystitis. Patient is being treated with CTX and Flagyl and GI and Surgery are following.     Problem/Plan - 1:  ·  Problem: Severe sepsis.   ·  Plan: patient with severe sepsis on presentation: hypotensive, tachycardic, leukocytosis, tachypnea and positive lactate, febrile to 101 rectally on arrival  given 1 L NS in ED  Possible source -   1. PNA - Less likely with a CT chest showing only atelectasis.   - CXR with poor inspiratory effort (8/4), Bilateral opacities/left basilar discoid atelectasis (8/5)  -c/w ceftriaxone (2000 mg q24h)  - patient started on soft diet per GI recs   -f/u bcx ucx - negative to date   -f/u Legionella antigen results  - CTPE found no PE and f/u D-dimer- rule out PE (tachycardic, tachypneic, febrile, in bed)    2. Possible acalculous judy + possible right sided diverticulitis (Had right abdominal tenderness, rebound and guarding- CTPE: No PE. Found evidence of extensive inflammation in right upper quadrant, probable acalculous cholecystitis, less likely right-sided acute diverticulitis)  - CT - evidence of colitis   - f/u GI pcr   - US - evidence of cholecystitis   - NPO until perforation ruled out  - follow GI recs   - Metronidazole added (500 mg q8h).    Problem/Plan - 2:  ·  Problem: Metabolic encephalopathy.   ·  Plan: patient with some confusion from baseline per [facility]HA - improved, likely 2/2 infection  stroke code called at 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 Moderate stenosis the proximal right ICA. Mild stenosis the proximal left ICA.  patient takes ativan q4 at home  due to level of secretions will hold off on further ativan now. no signs of withdrawal at this time - tremor is pill rolling   -[facility] protocol   -stroke consulted:   - f/u neuro recs: MR brain w/o contrast, Carotid US b/l to confirm degree of stenosis, TTE w/ bubble, Start ASA 81mg + Plavix 75mg for 3 weeks followed by ASA 81mg indefinitely , Lipid panel, A1c, Atorvastatin 40mg per ASCVD risk  -c/w above plan.    Problem/Plan - 3:  ·  Problem: Pneumonia, aspiration.   ·  Plan: - Continue with Ceftriaxone.   - No overt PNA on CT chest.    Problem/Plan - 4:  ·  Problem: Right lower quadrant abdominal tenderness with rebound tenderness.   ·  Plan: Had right abdominal tenderness, rebound and guarding, CTPE found evidence of probable acalculous cholecystitis, less likely right-sided acute diverticulitis  - see above problem and plan.    Problem/Plan - 5:  ·  Problem: WILL (acute kidney injury).   ·  Plan: RESOLVING  patient with will on arrival  baseline cr 0.9 was at 1.75  improvement seen after 1 L NS  likely 2/2 dehydration  -f/u bnp.    Problem/Plan - 6:  ·  Problem: Parkinsons.   ·  Plan: patient with hx of parkinsons  seen with pill rolling tremor on exam  has hx of constipation 2/2 parkinsons  bm every 4 days - last one 4 days ago  -c/w bowel regimen  - started on home levodopa/carbidopa (0.5 tabs 4x daily, PO).    Problem/Plan - 7:  ·  Problem: DM (diabetes mellitus).   ·  Plan: patient with hx of diabetes  blood glucose in 200s-300s  -monitor fingersticks  -c/w sliding scale.    Problem/Plan - 8:  ·  Problem: Prophylactic measure.   ·  Plan: F: s/p 1 L  E: replete as needed  N: Soft diet   DVT ppx: heparin subq.

## 2022-08-08 NOTE — PROGRESS NOTE ADULT - PROBLEM SELECTOR PLAN 5
RESOLVING  patient with WILL on arrival  baseline cr 0.9 was at 1.75  improvement seen after 1 L NS  likely 2/2 dehydration  -f/u bmp

## 2022-08-08 NOTE — PROGRESS NOTE ADULT - PROBLEM SELECTOR PLAN 6
Patient with hx of parkinsons  seen with pill rolling tremor on exam  has hx of constipation 2/2 parkinsons  bm every 4 days - last one 4 days ago  -c/w bowel regimen  - started on home levodopa/carbidopa (0.5 tabs 4x daily, PO)

## 2022-08-08 NOTE — CONSULT NOTE ADULT - SUBJECTIVE AND OBJECTIVE BOX
GASTROENTEROLOGY CONSULT NOTE  HPI:  81 yo M, PMHx of Parkinson Disease, HTN, DM, HLD   Presents when [facility] called EMS for pale appearance / hypotension , being treated in [facility] for PNA  / Code Stroke in ED  A CT scan showed colitis as well as ?Cholecystitis , for which GI was consulted.  At bedside, the patient offers few complaints, and HHA also does not have much to report ;   Patient received an enema which relived some symptoms   At time of admission, last bowel movement was 4 days ago which he states is standard for him and has no other complaints at this time.    Allergies    No Known Allergies    Intolerances      Home Medications:  aspirin 81 mg oral tablet: 1 tab(s) orally once a day (05 Aug 2022 13:47)  carbidopa-levodopa 25 mg-250 mg oral tablet: 0.5 tab(s) orally 4 times a day (05 Aug 2022 13:47)  Centrum oral tablet: 1 tab(s) orally once a day (05 Aug 2022 13:47)  Crestor 20 mg oral tablet: 1 tab(s) orally once a day (at bedtime) (05 Aug 2022 13:47)  doxepin 10 mg oral capsule: 1 cap(s) orally once a day  Can give up to 2 caps per day (05 Aug 2022 13:47)  Fish Oil oral capsule: 1 cap(s) orally once a day (05 Aug 2022 13:47)  LORAZEPAM  1 MG TABS: 1 tab(s) orally 4 times a day (05 Aug 2022 13:47)  metFORMIN 750 mg oral tablet, extended release: 2 tab(s) orally once a day (05 Aug 2022 13:47)  NIFEdipine 30 mg oral tablet, extended release: 1 tab(s) orally once a day (05 Aug 2022 13:47)  OLANZapine 7.5 mg oral tablet: 1 tab(s) orally once a day (at bedtime) (05 Aug 2022 13:47)  quinapril 40 mg oral tablet: 1 tab(s) orally once a day (05 Aug 2022 13:47)  Santyl 250 units/g topical ointment: Apply topically to affected area once a day (05 Aug 2022 13:47)  Vitamin B12 1000 mcg oral tablet: 1 tab(s) orally once a day (05 Aug 2022 13:47)  Vitamin D3 25 mcg (1000 intl units) oral tablet: 1 tab(s) orally once a day (05 Aug 2022 13:47)    MEDICATIONS:  MEDICATIONS  (STANDING):  aspirin  chewable 81 milliGRAM(s) Oral daily  atorvastatin 40 milliGRAM(s) Oral at bedtime  carbidopa/levodopa  25/250 0.5 Tablet(s) Oral four times a day  cefTRIAXone   IVPB 2000 milliGRAM(s) IV Intermittent every 24 hours  clopidogrel Tablet 75 milliGRAM(s) Oral daily  dextrose 5%. 1000 milliLiter(s) (100 mL/Hr) IV Continuous <Continuous>  dextrose 5%. 1000 milliLiter(s) (50 mL/Hr) IV Continuous <Continuous>  dextrose 50% Injectable 25 Gram(s) IV Push once  dextrose 50% Injectable 12.5 Gram(s) IV Push once  dextrose 50% Injectable 25 Gram(s) IV Push once  glucagon  Injectable 1 milliGRAM(s) IntraMuscular once  heparin   Injectable 5000 Unit(s) SubCutaneous every 8 hours  insulin lispro (ADMELOG) corrective regimen sliding scale   SubCutaneous Before meals and at bedtime  lactated ringers. 1000 milliLiter(s) (50 mL/Hr) IV Continuous <Continuous>  LORazepam     Tablet 1 milliGRAM(s) Oral every 12 hours  metroNIDAZOLE  IVPB      metroNIDAZOLE  IVPB 500 milliGRAM(s) IV Intermittent every 8 hours  polyethylene glycol 3350 17 Gram(s) Oral every 24 hours  senna 1 Tablet(s) Oral every 24 hours    MEDICATIONS  (PRN):  dextrose Oral Gel 15 Gram(s) Oral once PRN Blood Glucose LESS THAN 70 milliGRAM(s)/deciliter    PAST MEDICAL & SURGICAL HISTORY:  DM2 (diabetes mellitus, type 2)  since 5/16/2017      Parkinsons      Hypertension      Hyperlipidemia      Depression      Melanoma      Depression      Tinnitus of left ear      Vertigo      No significant past surgical history        FAMILY HISTORY:  Family history of stroke      SOCIAL HISTORY:  Tobacco: denies  Alcohol: denies  Illicit Drugs: denies    REVIEW OF SYSTEMS:  All other 10 review of systems is negative unless indicated above.    Vital Signs Last 24 Hrs  T(C): 36.5 (08 Aug 2022 06:08), Max: 36.9 (07 Aug 2022 14:14)  T(F): 97.7 (08 Aug 2022 06:08), Max: 98.4 (07 Aug 2022 14:14)  HR: 109 (08 Aug 2022 06:08) (106 - 109)  BP: 144/90 (08 Aug 2022 06:08) (109/72 - 144/90)  BP(mean): --  RR: 17 (08 Aug 2022 06:08) (16 - 18)  SpO2: 94% (08 Aug 2022 06:08) (94% - 98%)    Parameters below as of 07 Aug 2022 20:52  Patient On (Oxygen Delivery Method): nasal cannula  O2 Flow (L/min): 2      08-07 @ 07:01  -  08-08 @ 07:00  --------------------------------------------------------  IN: 0 mL / OUT: 200 mL / NET: -200 mL        PHYSICAL EXAM:    General: elderly male, lying in bed, in no acute distress  HEENT: Neck supple, mmm, no jvd, NC on  Lungs: Normal respiratory effort, no intercostal retractions  Cardiovascular: tachycardic  Abdomen: Soft, non-tender non-distended; No rebound or guarding  Extremities: wwp, no trace LE edema  Neurological: FERRO, speech fluent, resting UE tremor  Skin: Warm and dry. No obvious rash    LABS:                        11.9   9.76  )-----------( 144      ( 08 Aug 2022 08:46 )             36.5     08-08    144  |  108  |  23  ----------------------------<  187<H>  3.0<L>   |  25  |  0.57    Ca    8.4      08 Aug 2022 08:46  Phos  2.4     08-08  Mg     2.0     08-08    TPro  5.8<L>  /  Alb  2.6<L>  /  TBili  0.3  /  DBili  x   /  AST  31  /  ALT  <5<L>  /  AlkPhos  83  08-08            RADIOLOGY & ADDITIONAL STUDIES:     Reviewed

## 2022-08-08 NOTE — PROGRESS NOTE ADULT - ASSESSMENT
81 year old Male w/ PMH of Parkinson's Disease (on Sinemet), NIDDM, HTN, HLD, Depression, Anxiety on ativan prn, presents w/ generalized weakness and hypotension in setting of aspiration pneumonia. On exam, patient with coghweel rigidity throughout without any lateralizing features. CTH w/ no acute pathology. CTA negative for LVO but did demonstrate mild to moderate stenosis of the bilateral intracranial vertebral arteries (left greater than right), moderate stenosis of the proximal right ICA and mild stenosis of the proximal left ICA. Carotid duplex demonstrated severe calcified plaque throughout the carotid system bilaterally; no elevated velocities that suggest hemodynamically significant stenosis.     1. Secondary stroke prevention  - Continue ASA 81mg QD  - OK to discontinue Plavix 75mg daily d/t negative MRI and  if there is possibility for surgical intervention (i.e. gallbladder)   - Continue Atorvastatin 40mg QD    2. Stroke risk factors  - A1C: 6.9  - LDL: please send  - HTN    3. Further management  - MRI negative for acute infarct  - Patient's AMS most likely 2/2 infectious process vs. hypoperfusion during hypotensive event  - OK to have patient follow up with [facility] Neurologist that follows him for his Parkinson's  - Stroke team will sign off. Please reconsult with any further questions or concerns     DVT prophylaxis   - Heparin SQ and SCDs    Discussed with Neurology Attending Dr. Suzi Obrien and Dr. Martinez

## 2022-08-08 NOTE — CONSULT NOTE ADULT - ASSESSMENT
81 yo M, PMHx of Parkinson Disease, HTN, DM, HLD   Presents when [facility] called EMS for pale appearance / hypotension , being treated in [facility] for ?PNA, s/p Code Stroke in ED  A CT scan showed colitis as well as ?Cholecystitis , for which GI was consulted.    #Abnormal CT Scan  #Cholecystitis  #?PNA  #?Reactive Colitis  #Normocytic Anemia  #Rectal Thickening  #Constipation s/p Enema    CT Scan: The gallbladder is distended and thick-walled with pericholecystic fat infiltration. No obvious radiopaque stones within the gallbladder. Probable   hypervascularity seen in segment five and segment 4B of liver around the gallbladder. In addition there is quite a long segment of wall thickening   with pericolic fat infiltration involving the hepatic flexure of colon.     Prime consideration would be acute cholecystitis with contiguous reactive wall thickening of the hepatic flexure of colon rather than acute colonic diverticulitis involving the hepatic flecture with contiguous involvement of the gallbladder.     Normal appendix. Colonic diverticulosis. Distended rectum with transverse diameter measuring 9.2 cm. Rectal wall thickening.   Rectum and sigmoid are distended and packed with fecal material.     Recommendations:  Further Imaging for GB per Surgical Services  Please maintain patient on bowel regimen s/p Enema  Consideration for endoscopic eval pending medical / surgical work up  f/u stool studies    Thank you for the courtesy of this consult. We will follow along with you.    Melo Medina M.D.  Gastroenterology Fellow  Pager: [phone]   83 yo M, PMHx of Parkinson Disease, HTN, DM, HLD   Presents when [facility] called EMS for pale appearance / hypotension , being treated in [facility] for ?PNA, s/p Code Stroke in ED  A CT scan showed colitis as well as ?Cholecystitis , for which GI was consulted.    #Abnormal CT Scan  #Cholecystitis  #?PNA  #?Reactive Colitis  #Normocytic Anemia  #Rectal Thickening  #Constipation s/p Enema    CT Scan: The gallbladder is distended and thick-walled with pericholecystic fat infiltration. No obvious radiopaque stones within the gallbladder. Probable   hypervascularity seen in segment five and segment 4B of liver around the gallbladder. In addition there is quite a long segment of wall thickening   with pericolic fat infiltration involving the hepatic flexure of colon.     Prime consideration would be acute cholecystitis with contiguous reactive wall thickening of the hepatic flexure of colon rather than acute colonic diverticulitis involving the hepatic flecture with contiguous involvement of the gallbladder.     Normal appendix. Colonic diverticulosis. Distended rectum with transverse diameter measuring 9.2 cm. Rectal wall thickening.   Rectum and sigmoid are distended and packed with fecal material.     Recommendations:  Further Imaging for GB per Surgical Services  Please maintain patient on bowel regimen s/p Enema  Likely Cholecystitis is primary insult , suspect Colitis secondary  Given there is possibility of diverticulitis, will defer on inpatient colonoscopy. Will need interval colonoscopy in 6-8 weeks  f/u stool studies    Thank you for allowing us to participate in the care of this patient. Please call us if you have any further questions or concerns    Melo Medina M.D.  Gastroenterology Fellow  Pager: [phone]

## 2022-08-08 NOTE — PROGRESS NOTE ADULT - NS ATTEND AMEND GEN_ALL_CORE FT
The patient is an 82-year-old gentleman with a history of Parkinson's disease (on Sinemet, follows with neuro at [facility]), type 2 diabetes, hypertension, hyperlipidemia, and depression/anxiety who was admitted with generalized weakness and hypotension in the setting of sepsis/pneumonia/acute cholecystitis. In this setting (SBP in 70s), he was also noted to have a L facial droop (?chronicity). Initial concern was for stroke/TIA as CTA did show moderate right ICA stenosis because of which he was started on DAPT, statin. MRI brain was negative for acute ischemia. Carotid US showed no hemodynamically significant carotid disease. Given symptoms occurred in the setting of hypotension, would recommend against acute intervention of carotid.  Rec continued aspirin, statin, and OP f/u for repeat carotid US in 1 year with [facility] neuro. ok to d/c Plavix. Avoid hypotension.

## 2022-08-08 NOTE — PROGRESS NOTE ADULT - PROBLEM SELECTOR PLAN 1
Patient with severe sepsis on presentation: hypotensive, tachycardic, leukocytosis, tachypnea and positive lactate, febrile to 101 rectally on arrival:  Possible source -   1. PNA - Less likely with a CT chest showing only atelectasis.   - CXR with poor inspiratory effort (8/4), Bilateral opacities/left basilar discoid atelectasis (8/5)  -c/w ceftriaxone (2000 mg q24h)   -f/u bcx ucx - negative to date   -f/u Legionella antigen results  - CTPE found no PE and f/u D-dimer- rule out PE (tachycardic, tachypneic, febrile, in bed)    2. Possible acalculous judy + possible right sided diverticulitis (Had right abdominal tenderness, rebound and guarding- CTPE: No PE. Found evidence of extensive inflammation in right upper quadrant, probable acalculous cholecystitis, less likely right-sided acute diverticulitis)  - US showed evidence of acute cholecystitis  - HIDA scan equivocal for cholecystitis  - Stroke team suggested d/c plavix if intervention planned   - CT - evidence of colitis - Metronidazole added (500 mg q8h)  - f/u GI pcr   - patient started on soft diet per GI recs  - follow GI and Surgery recs

## 2022-08-08 NOTE — PROGRESS NOTE ADULT - SUBJECTIVE AND OBJECTIVE BOX
O/N Events:    Subjective/ROS: Patient seen and examined at bedside.     Denies Fever/Chills, HA, CP, SOB, n/v, changes in bowel/urinary habits.  12pt ROS otherwise negative.    VITALS  Vital Signs Last 24 Hrs  T(C): 37.3 (08 Aug 2022 13:51), Max: 37.3 (08 Aug 2022 13:51)  T(F): 99.1 (08 Aug 2022 13:51), Max: 99.1 (08 Aug 2022 13:51)  HR: 100 (08 Aug 2022 13:51) (100 - 109)  BP: 134/77 (08 Aug 2022 13:51) (109/72 - 144/90)  BP(mean): --  RR: 18 (08 Aug 2022 13:51) (16 - 18)  SpO2: 91% (08 Aug 2022 13:51) (91% - 98%)    Parameters below as of 08 Aug 2022 13:51  Patient On (Oxygen Delivery Method): nasal cannula  O2 Flow (L/min): 2      CAPILLARY BLOOD GLUCOSE      POCT Blood Glucose.: 173 mg/dL (08 Aug 2022 13:26)  POCT Blood Glucose.: 187 mg/dL (08 Aug 2022 08:37)  POCT Blood Glucose.: 203 mg/dL (07 Aug 2022 21:14)  POCT Blood Glucose.: 190 mg/dL (07 Aug 2022 17:21)      PHYSICAL EXAM  General: NAD  Head: NC/AT; MMM; PERRL; EOMI;  Neck: Supple; no JVD  Respiratory: CTAB; no wheezes/rales/rhonchi  Cardiovascular: Regular rhythm/rate; S1/S2+, no murmurs, rubs gallops   Gastrointestinal: Soft; NTND; bowel sounds normal and present  Extremities: WWP; no edema/cyanosis  Neurological: A&Ox3, CNII-XII grossly intact; no obvious focal deficits    MEDICATIONS  (STANDING):  aspirin  chewable 81 milliGRAM(s) Oral daily  atorvastatin 40 milliGRAM(s) Oral at bedtime  carbidopa/levodopa  25/250 0.5 Tablet(s) Oral four times a day  cefTRIAXone   IVPB 2000 milliGRAM(s) IV Intermittent every 24 hours  clopidogrel Tablet 75 milliGRAM(s) Oral daily  dextrose 5%. 1000 milliLiter(s) (100 mL/Hr) IV Continuous <Continuous>  dextrose 5%. 1000 milliLiter(s) (50 mL/Hr) IV Continuous <Continuous>  dextrose 50% Injectable 25 Gram(s) IV Push once  dextrose 50% Injectable 12.5 Gram(s) IV Push once  dextrose 50% Injectable 25 Gram(s) IV Push once  glucagon  Injectable 1 milliGRAM(s) IntraMuscular once  heparin   Injectable 5000 Unit(s) SubCutaneous every 8 hours  insulin lispro (ADMELOG) corrective regimen sliding scale   SubCutaneous Before meals and at bedtime  lactated ringers. 1000 milliLiter(s) (100 mL/Hr) IV Continuous <Continuous>  LORazepam     Tablet 1 milliGRAM(s) Oral every 12 hours  metroNIDAZOLE  IVPB      metroNIDAZOLE  IVPB 500 milliGRAM(s) IV Intermittent every 8 hours  polyethylene glycol 3350 17 Gram(s) Oral every 24 hours  senna 1 Tablet(s) Oral every 24 hours    MEDICATIONS  (PRN):  dextrose Oral Gel 15 Gram(s) Oral once PRN Blood Glucose LESS THAN 70 milliGRAM(s)/deciliter      No Known Allergies      LABS                        11.9   9.76  )-----------( 144      ( 08 Aug 2022 08:46 )             36.5     08-08    144  |  108  |  23  ----------------------------<  187<H>  3.0<L>   |  25  |  0.57    Ca    8.4      08 Aug 2022 08:46  Phos  2.4     08-08  Mg     2.0     08-08    TPro  5.8<L>  /  Alb  2.6<L>  /  TBili  0.3  /  DBili  x   /  AST  31  /  ALT  <5<L>  /  AlkPhos  83  08-08                IMAGING/EKG/ETC   O/N Events: No acute events overnight.     Subjective/ROS: Patient seen and examined at bedside. Feels well and did not endorse pain. Last bowel movement was with the enema yesterday in am.     Denies Fever/Chills, HA, CP, SOB, n/v, changes in bowel/urinary habits.  12pt ROS otherwise negative.    VITALS  Vital Signs Last 24 Hrs  T(C): 37.3 (08 Aug 2022 13:51), Max: 37.3 (08 Aug 2022 13:51)  T(F): 99.1 (08 Aug 2022 13:51), Max: 99.1 (08 Aug 2022 13:51)  HR: 100 (08 Aug 2022 13:51) (100 - 109)  BP: 134/77 (08 Aug 2022 13:51) (109/72 - 144/90)  BP(mean): --  RR: 18 (08 Aug 2022 13:51) (16 - 18)  SpO2: 91% (08 Aug 2022 13:51) (91% - 98%)    Parameters below as of 08 Aug 2022 13:51  Patient On (Oxygen Delivery Method): nasal cannula  O2 Flow (L/min): 2      CAPILLARY BLOOD GLUCOSE      POCT Blood Glucose.: 173 mg/dL (08 Aug 2022 13:26)  POCT Blood Glucose.: 187 mg/dL (08 Aug 2022 08:37)  POCT Blood Glucose.: 203 mg/dL (07 Aug 2022 21:14)  POCT Blood Glucose.: 190 mg/dL (07 Aug 2022 17:21)      PHYSICAL EXAM  General: NAD  Head: NC/AT; MMM  Neck: Supple; no JVD  Respiratory: CTAB; no wheezes/rales/rhonchi  Cardiovascular: Regular rhythm/rate; S1/S2+, no murmurs, rubs gallops   Gastrointestinal: Distended, tense, not rigid, tender to palpation in RUQ but not in other quadrants, bowel sounds normal and present  Extremities: WWP; no edema/cyanosis  Neurological: A&Ox3, no obvious focal deficits    MEDICATIONS  (STANDING):  aspirin  chewable 81 milliGRAM(s) Oral daily  atorvastatin 40 milliGRAM(s) Oral at bedtime  carbidopa/levodopa  25/250 0.5 Tablet(s) Oral four times a day  cefTRIAXone   IVPB 2000 milliGRAM(s) IV Intermittent every 24 hours  clopidogrel Tablet 75 milliGRAM(s) Oral daily  dextrose 5%. 1000 milliLiter(s) (100 mL/Hr) IV Continuous <Continuous>  dextrose 5%. 1000 milliLiter(s) (50 mL/Hr) IV Continuous <Continuous>  dextrose 50% Injectable 25 Gram(s) IV Push once  dextrose 50% Injectable 12.5 Gram(s) IV Push once  dextrose 50% Injectable 25 Gram(s) IV Push once  glucagon  Injectable 1 milliGRAM(s) IntraMuscular once  heparin   Injectable 5000 Unit(s) SubCutaneous every 8 hours  insulin lispro (ADMELOG) corrective regimen sliding scale   SubCutaneous Before meals and at bedtime  lactated ringers. 1000 milliLiter(s) (100 mL/Hr) IV Continuous <Continuous>  LORazepam     Tablet 1 milliGRAM(s) Oral every 12 hours  metroNIDAZOLE  IVPB      metroNIDAZOLE  IVPB 500 milliGRAM(s) IV Intermittent every 8 hours  polyethylene glycol 3350 17 Gram(s) Oral every 24 hours  senna 1 Tablet(s) Oral every 24 hours    MEDICATIONS  (PRN):  dextrose Oral Gel 15 Gram(s) Oral once PRN Blood Glucose LESS THAN 70 milliGRAM(s)/deciliter      No Known Allergies      LABS                        11.9   9.76  )-----------( 144      ( 08 Aug 2022 08:46 )             36.5     08-08    144  |  108  |  23  ----------------------------<  187<H>  3.0<L>   |  25  |  0.57    Ca    8.4      08 Aug 2022 08:46  Phos  2.4     08-08  Mg     2.0     08-08    TPro  5.8<L>  /  Alb  2.6<L>  /  TBili  0.3  /  DBili  x   /  AST  31  /  ALT  <5<L>  /  AlkPhos  83  08-08        IMAGING/EKG/ETC  Reviewed.

## 2022-08-08 NOTE — PROGRESS NOTE ADULT - PROBLEM SELECTOR PLAN 2
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 Moderate stenosis the proximal right ICA. Mild stenosis the proximal left ICA.  - Stroke consulted:   MR brain w/o contrast - negative    Carotid US b/l to confirm degree of stenosis - f/u as outpatient   Start ASA 81mg + Plavix 75mg for 3 weeks followed by ASA 81mg indefinitely , Lipid panel, A1c, Atorvastatin 40mg per ASCVD risk

## 2022-08-08 NOTE — PROGRESS NOTE ADULT - SUBJECTIVE AND OBJECTIVE BOX
SUBJECTIVE: Doing well this AM. NAEON. Denies n/v. Endorses f/bm. Denies cp/sob. Pain is improved and well controlled. Ambulating as tolerated. Tolerating diet.      MEDICATIONS  (STANDING):  aspirin  chewable 81 milliGRAM(s) Oral daily  atorvastatin 40 milliGRAM(s) Oral at bedtime  carbidopa/levodopa  25/250 0.5 Tablet(s) Oral four times a day  cefTRIAXone   IVPB 2000 milliGRAM(s) IV Intermittent every 24 hours  clopidogrel Tablet 75 milliGRAM(s) Oral daily  dextrose 5%. 1000 milliLiter(s) (100 mL/Hr) IV Continuous <Continuous>  dextrose 5%. 1000 milliLiter(s) (50 mL/Hr) IV Continuous <Continuous>  dextrose 50% Injectable 25 Gram(s) IV Push once  dextrose 50% Injectable 12.5 Gram(s) IV Push once  dextrose 50% Injectable 25 Gram(s) IV Push once  glucagon  Injectable 1 milliGRAM(s) IntraMuscular once  heparin   Injectable 5000 Unit(s) SubCutaneous every 8 hours  insulin lispro (ADMELOG) corrective regimen sliding scale   SubCutaneous Before meals and at bedtime  lactated ringers. 1000 milliLiter(s) (60 mL/Hr) IV Continuous <Continuous>  LORazepam     Tablet 1 milliGRAM(s) Oral every 12 hours  metroNIDAZOLE  IVPB      metroNIDAZOLE  IVPB 500 milliGRAM(s) IV Intermittent every 8 hours  polyethylene glycol 3350 17 Gram(s) Oral every 24 hours  senna 1 Tablet(s) Oral every 24 hours    MEDICATIONS  (PRN):  dextrose Oral Gel 15 Gram(s) Oral once PRN Blood Glucose LESS THAN 70 milliGRAM(s)/deciliter      Vital Signs Last 24 Hrs  T(C): 36.5 (08 Aug 2022 06:08), Max: 36.9 (07 Aug 2022 14:14)  T(F): 97.7 (08 Aug 2022 06:08), Max: 98.4 (07 Aug 2022 14:14)  HR: 109 (08 Aug 2022 06:08) (106 - 109)  BP: 144/90 (08 Aug 2022 06:08) (109/72 - 144/90)  BP(mean): --  RR: 17 (08 Aug 2022 06:08) (16 - 18)  SpO2: 94% (08 Aug 2022 06:08) (94% - 98%)    Parameters below as of 07 Aug 2022 20:52  Patient On (Oxygen Delivery Method): nasal cannula  O2 Flow (L/min): 2      Physical Exam:  General: NAD, resting comfortably in bed  Pulmonary: Nonlabored breathing, no respiratory distress  Cardiovascular: NSR  Abdominal: soft, mild RUQ TTP lateral to Christy's point, ND  Extremities: WWP, normal strength  Neuro: A/O x 3, CNs II-XII grossly intact, no focal deficits    I&O's Summary    07 Aug 2022 07:01  -  08 Aug 2022 07:00  --------------------------------------------------------  IN: 0 mL / OUT: 200 mL / NET: -200 mL        LABS:                        12.2   10.04 )-----------( 133      ( 07 Aug 2022 05:30 )             37.1     08-07    145  |  109<H>  |  27<H>  ----------------------------<  156<H>  3.4<L>   |  24  |  0.66    Ca    8.7      07 Aug 2022 05:30  Phos  2.3     08-07  Mg     2.2     08-07    TPro  5.8<L>  /  Alb  2.9<L>  /  TBili  0.3  /  DBili  x   /  AST  38  /  ALT  18  /  AlkPhos  83  08-07        CAPILLARY BLOOD GLUCOSE      POCT Blood Glucose.: 187 mg/dL (08 Aug 2022 08:37)  POCT Blood Glucose.: 203 mg/dL (07 Aug 2022 21:14)  POCT Blood Glucose.: 190 mg/dL (07 Aug 2022 17:21)  POCT Blood Glucose.: 193 mg/dL (07 Aug 2022 11:57)    LIVER FUNCTIONS - ( 07 Aug 2022 05:30 )  Alb: 2.9 g/dL / Pro: 5.8 g/dL / ALK PHOS: 83 U/L / ALT: 18 U/L / AST: 38 U/L / GGT: x        SUBJECTIVE: Doing well this AM. NAEON. Denies n/v. Endorses f/bm. Denies cp/sob. Pain is improved and well controlled. Ambulating as tolerated. Tolerating diet.      MEDICATIONS  (STANDING):  aspirin  chewable 81 milliGRAM(s) Oral daily  atorvastatin 40 milliGRAM(s) Oral at bedtime  carbidopa/levodopa  25/250 0.5 Tablet(s) Oral four times a day  cefTRIAXone   IVPB 2000 milliGRAM(s) IV Intermittent every 24 hours  clopidogrel Tablet 75 milliGRAM(s) Oral daily  dextrose 5%. 1000 milliLiter(s) (100 mL/Hr) IV Continuous <Continuous>  dextrose 5%. 1000 milliLiter(s) (50 mL/Hr) IV Continuous <Continuous>  dextrose 50% Injectable 25 Gram(s) IV Push once  dextrose 50% Injectable 12.5 Gram(s) IV Push once  dextrose 50% Injectable 25 Gram(s) IV Push once  glucagon  Injectable 1 milliGRAM(s) IntraMuscular once  heparin   Injectable 5000 Unit(s) SubCutaneous every 8 hours  insulin lispro (ADMELOG) corrective regimen sliding scale   SubCutaneous Before meals and at bedtime  lactated ringers. 1000 milliLiter(s) (60 mL/Hr) IV Continuous <Continuous>  LORazepam     Tablet 1 milliGRAM(s) Oral every 12 hours  metroNIDAZOLE  IVPB      metroNIDAZOLE  IVPB 500 milliGRAM(s) IV Intermittent every 8 hours  polyethylene glycol 3350 17 Gram(s) Oral every 24 hours  senna 1 Tablet(s) Oral every 24 hours    MEDICATIONS  (PRN):  dextrose Oral Gel 15 Gram(s) Oral once PRN Blood Glucose LESS THAN 70 milliGRAM(s)/deciliter      Vital Signs Last 24 Hrs  T(C): 36.5 (08 Aug 2022 06:08), Max: 36.9 (07 Aug 2022 14:14)  T(F): 97.7 (08 Aug 2022 06:08), Max: 98.4 (07 Aug 2022 14:14)  HR: 109 (08 Aug 2022 06:08) (106 - 109)  BP: 144/90 (08 Aug 2022 06:08) (109/72 - 144/90)  BP(mean): --  RR: 17 (08 Aug 2022 06:08) (16 - 18)  SpO2: 94% (08 Aug 2022 06:08) (94% - 98%)    Parameters below as of 07 Aug 2022 20:52  Patient On (Oxygen Delivery Method): nasal cannula  O2 Flow (L/min): 2      Physical Exam:  General: NAD, resting comfortably in bed  Pulmonary: Nonlabored breathing, no respiratory distress  Cardiovascular: NSR  Abdominal: soft, NT, ND  Extremities: WWP, normal strength  Neuro: A/O x 3, CNs II-XII grossly intact, no focal deficits    I&O's Summary    07 Aug 2022 07:01  -  08 Aug 2022 07:00  --------------------------------------------------------  IN: 0 mL / OUT: 200 mL / NET: -200 mL        LABS:                        12.2   10.04 )-----------( 133      ( 07 Aug 2022 05:30 )             37.1     08-07    145  |  109<H>  |  27<H>  ----------------------------<  156<H>  3.4<L>   |  24  |  0.66    Ca    8.7      07 Aug 2022 05:30  Phos  2.3     08-07  Mg     2.2     08-07    TPro  5.8<L>  /  Alb  2.9<L>  /  TBili  0.3  /  DBili  x   /  AST  38  /  ALT  18  /  AlkPhos  83  08-07        CAPILLARY BLOOD GLUCOSE      POCT Blood Glucose.: 187 mg/dL (08 Aug 2022 08:37)  POCT Blood Glucose.: 203 mg/dL (07 Aug 2022 21:14)  POCT Blood Glucose.: 190 mg/dL (07 Aug 2022 17:21)  POCT Blood Glucose.: 193 mg/dL (07 Aug 2022 11:57)    LIVER FUNCTIONS - ( 07 Aug 2022 05:30 )  Alb: 2.9 g/dL / Pro: 5.8 g/dL / ALK PHOS: 83 U/L / ALT: 18 U/L / AST: 38 U/L / GGT: x

## 2022-08-08 NOTE — PROGRESS NOTE ADULT - ASSESSMENT
82M PMH Parkinson's, HTN, HLD, DM BIBEMS from home after HHA noted he was pale and weak, admitted for sepsis w/ suspected CAP (now unlikely source) and stroke code called but negative w/ moderate R ICA stenosis started on ASA/plavix and continued sepsis w/u. General surgery consulted for finding of hepatic flexure colitis on CT scan along w/ abdominal pain on exam. CT scan equivocal for colitis vs malignancy w/ reactive gallbladder inflammation vs cholecystitis w/ reactive colitis and RUQ US showing GBWT and PCCF suggestive of possible cholecystitis. However, CT scan w/ significant colonic involvement and mesenteric edema typically unseen in acute cholecystitis imaging, so diagnosis of primary acute cholecystitis is unclear.    Recommendations:   - Recommend HIDA scan   - monitor UOP  - monitor BMs  - Agree w/ cef/flagyl  - downtrending fever curve now afebrile through day and downtrending WBC noted  - abdominal exam improved, continue exams  - stool studies  - f/u GI recs  - f/u CEA  - enemas for constipation  - continue bowel regimen

## 2022-08-09 NOTE — PROGRESS NOTE ADULT - SUBJECTIVE AND OBJECTIVE BOX
SUBJECTIVE: Doing well this AM. Pt is persistently low grade tachycardic. Denies n/v. Endorses f/bm. Denies cp/sob. Pain is well controlled. Ambulating as tolerated. Tolerating diet.      MEDICATIONS  (STANDING):  aspirin  chewable 81 milliGRAM(s) Oral daily  atorvastatin 40 milliGRAM(s) Oral at bedtime  carbidopa/levodopa  25/250 0.5 Tablet(s) Oral four times a day  cefTRIAXone   IVPB 1000 milliGRAM(s) IV Intermittent every 24 hours  dextrose 5%. 1000 milliLiter(s) (100 mL/Hr) IV Continuous <Continuous>  dextrose 5%. 1000 milliLiter(s) (50 mL/Hr) IV Continuous <Continuous>  dextrose 50% Injectable 25 Gram(s) IV Push once  dextrose 50% Injectable 12.5 Gram(s) IV Push once  dextrose 50% Injectable 25 Gram(s) IV Push once  glucagon  Injectable 1 milliGRAM(s) IntraMuscular once  heparin   Injectable 5000 Unit(s) SubCutaneous every 8 hours  insulin lispro (ADMELOG) corrective regimen sliding scale   SubCutaneous Before meals and at bedtime  lactated ringers. 1000 milliLiter(s) (100 mL/Hr) IV Continuous <Continuous>  LORazepam     Tablet 1 milliGRAM(s) Oral every 12 hours  metroNIDAZOLE  IVPB      metroNIDAZOLE  IVPB 500 milliGRAM(s) IV Intermittent every 8 hours  polyethylene glycol 3350 17 Gram(s) Oral every 24 hours  senna 1 Tablet(s) Oral every 24 hours    MEDICATIONS  (PRN):  dextrose Oral Gel 15 Gram(s) Oral once PRN Blood Glucose LESS THAN 70 milliGRAM(s)/deciliter      Vital Signs Last 24 Hrs  T(C): 36.4 (09 Aug 2022 05:40), Max: 37.3 (08 Aug 2022 13:51)  T(F): 97.6 (09 Aug 2022 05:40), Max: 99.1 (08 Aug 2022 13:51)  HR: 105 (09 Aug 2022 05:40) (100 - 105)  BP: 170/84 (09 Aug 2022 05:40) (134/77 - 170/84)  BP(mean): --  RR: 19 (09 Aug 2022 05:40) (18 - 19)  SpO2: 94% (09 Aug 2022 05:40) (91% - 94%)    Parameters below as of 09 Aug 2022 05:40  Patient On (Oxygen Delivery Method): nasal cannula        Physical Exam:  General: NAD, resting comfortably in bed  Pulmonary: Nonlabored breathing, no respiratory distress  Cardiovascular: NSR  Abdominal: soft, NT/ND  Extremities: WWP, normal strength  Neuro: A/O x 3, CNs II-XII grossly intact, no focal deficits    I&O's Summary      LABS:                        11.8   9.82  )-----------( 161      ( 09 Aug 2022 09:14 )             36.5     08-08    144  |  108  |  23  ----------------------------<  187<H>  3.0<L>   |  25  |  0.57    Ca    8.4      08 Aug 2022 08:46  Phos  2.4     08-08  Mg     2.0     08-08    TPro  5.8<L>  /  Alb  2.6<L>  /  TBili  0.3  /  DBili  x   /  AST  31  /  ALT  <5<L>  /  AlkPhos  83  08-08        CAPILLARY BLOOD GLUCOSE      POCT Blood Glucose.: 212 mg/dL (09 Aug 2022 09:23)  POCT Blood Glucose.: 190 mg/dL (08 Aug 2022 21:34)  POCT Blood Glucose.: 184 mg/dL (08 Aug 2022 19:01)  POCT Blood Glucose.: 173 mg/dL (08 Aug 2022 13:26)    LIVER FUNCTIONS - ( 08 Aug 2022 08:46 )  Alb: 2.6 g/dL / Pro: 5.8 g/dL / ALK PHOS: 83 U/L / ALT: <5 U/L / AST: 31 U/L / GGT: x             RADIOLOGY & ADDITIONAL STUDIES:

## 2022-08-09 NOTE — DISCHARGE NOTE PROVIDER - HOSPITAL COURSE
83yo M with h/o Parkinsons, HTN, DM, HLD presents after the home health aid called EMS because the patient looked extremely pale and hypotensive. He was admitted for severe sepsis likely 2/2 CAP with possible right sided abdominal inflammation seen on CTA chest. Abdominal imaging revealed evidence of Colitis and Cholecystitis. Patient is being treated with CTX and Flagyl and GI and Surgery are following. IR consulted for potential percutaneous cholecystostomy.      Problem/Plan - 1:  ·  Problem: Severe sepsis.   ·  Plan: Patient with severe sepsis on presentation: hypotensive, tachycardic, leukocytosis, tachypnea and positive lactate, febrile to 101 rectally on arrival:  Possible source -   1. PNA - Less likely with a CT chest showing only atelectasis.   - CXR with poor inspiratory effort (8/4), Bilateral opacities/left basilar discoid atelectasis (8/5)  -c/w ceftriaxone (2000 mg q24h) Day 5   -f/u bcx ucx - negative to date   -f/u Legionella antigen results  - CTPE found no PE and f/u D-dimer- rule out PE (tachycardic, tachypneic, febrile, in bed)    2. Possible acalculous judy + possible right sided diverticulitis (Had right abdominal tenderness, rebound and guarding- CTPE: No PE. Found evidence of extensive inflammation in right upper quadrant, probable acalculous cholecystitis, less likely right-sided acute diverticulitis)  - US showed evidence of acute cholecystitis  - HIDA scan equivocal for cholecystitis  - IR consulted for percutaneous cholecystostomy   - Discontinued aspirin and plavix for possible IR procedure   - CT - evidence of colitis - Metronidazole added (500 mg q8h) Day 4   - f/u GI pcr   - patient started on soft diet per GI recs  - follow GI and Surgery recs.     Problem/Plan - 2:  ·  Problem: Metabolic encephalopathy.   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 Moderate stenosis the proximal right ICA. Mild stenosis the proximal left ICA.  - Stroke consulted:   MR brain w/o contrast - negative    Carotid US b/l to confirm degree of stenosis - f/u as outpatient.     Problem/Plan - 3:  ·  Problem: Pneumonia, aspiration.   ·  Plan: - Continue with Ceftriaxone.   - No overt PNA on CT chest.     Problem/Plan - 4:  ·  Problem: Right lower quadrant abdominal tenderness with rebound tenderness.   ·  Plan: Had right abdominal tenderness, rebound and guarding, CTPE found evidence of probable acalculous cholecystitis, less likely right-sided acute diverticulitis  - see above problem and plan.     Problem/Plan - 5:  ·  Problem: WILL (acute kidney injury).   ·  Plan: RESOLVING  patient with WILL on arrival  baseline cr 0.9 was at 1.75  improvement seen after 1 L NS  likely 2/2 dehydration  -f/u bmp.     Problem/Plan - 6:  ·  Problem: Parkinsons.   ·  Plan: Patient with hx of parkinsons  seen with pill rolling tremor on exam  has hx of constipation 2/2 parkinsons  bm every 4 days - last one 4 days ago  -c/w bowel regimen  - started on home levodopa/carbidopa (0.5 tabs 4x daily, PO).     Problem/Plan - 7:  ·  Problem: DM (diabetes mellitus).   ·  Plan: Patient with hx of diabetes  blood glucose in 200s-300s  -monitor fingersticks  -c/w sliding scale.     Problem/Plan - 8:  ·  Problem: Hypertension.   ·  Plan: - patient started on lisinopril 40mg PO (interchange with home quinapril).

## 2022-08-09 NOTE — PROGRESS NOTE ADULT - ASSESSMENT
83yo M with h/o Parkinsons, HTN, DM, HLD presents after the home health aid called EMS because the patient looked extremely pale and hypotensive. He was admitted for severe sepsis likely 2/2 CAP with possible right sided abdominal inflammation seen on CTA chest. Abdominal imaging revealed evidence of Colitis and Cholecystitis. Patient is being treated with CTX and Flagyl and GI and Surgery are following. IR consulted for potential percutaneous cholecystostomy.

## 2022-08-09 NOTE — PROGRESS NOTE ADULT - PROBLEM SELECTOR PLAN 1
Patient with severe sepsis on presentation: hypotensive, tachycardic, leukocytosis, tachypnea and positive lactate, febrile to 101 rectally on arrival:  Possible source -   1. PNA - Less likely with a CT chest showing only atelectasis.   - CXR with poor inspiratory effort (8/4), Bilateral opacities/left basilar discoid atelectasis (8/5)  -c/w ceftriaxone (2000 mg q24h) Day 5   -f/u bcx ucx - negative to date   -f/u Legionella antigen results  - CTPE found no PE and f/u D-dimer- rule out PE (tachycardic, tachypneic, febrile, in bed)    2. Possible acalculous judy + possible right sided diverticulitis (Had right abdominal tenderness, rebound and guarding- CTPE: No PE. Found evidence of extensive inflammation in right upper quadrant, probable acalculous cholecystitis, less likely right-sided acute diverticulitis)  - US showed evidence of acute cholecystitis  - HIDA scan equivocal for cholecystitis  - IR consulted for percutaneous cholecystostomy   - Discontinued aspirin and plavix for possible IR procedure   - CT - evidence of colitis - Metronidazole added (500 mg q8h) Day 4   - f/u GI pcr   - patient started on soft diet per GI recs  - follow GI and Surgery recs

## 2022-08-09 NOTE — CONSULT NOTE ADULT - ASSESSMENT
Assessment: 82M PMH Parkinson's, HTN, HLD, DM, BIBEMS from home after HHA noted he was pale and weak, admitted for sepsis w/ suspected CAP (now unlikely source) and stroke code called but negative w/ moderate R ICA stenosis started on ASA/plavix and continued sepsis w/u. General surgery following for finding of hepatic flexure colitis on CT scan along w/ abdominal pain on exam. CT and HIDA equivocal for colitis vs malignancy w/ reactive gallbladder inflammation vs cholecystitis w/ reactive colitis and RUQ US showing GBWT and PCCF suggestive of possible cholecystitis. IR consulted by surgery for percutaneous cholecystostomy tube placement. Case reviewed with Dr. Moncada, imaging equivocal for acute cholecystitis, pt with normal WBC count, afebrile, normal T bili. Last dose aspirin and plavix 8/8 12p, and subq heparin 8/9 6AM, would favor deferring procedure due to bleeding risk until aspirin and plavix are held for 5 days (subq heparin held for 12h) unless pt becomes acutely septic. No plan for IR intervention at this time.     Communicated with: Dr. Walker primary team

## 2022-08-09 NOTE — PROGRESS NOTE ADULT - SUBJECTIVE AND OBJECTIVE BOX
O/N Events: No acute events overnight. Benign abdominal exam.     Subjective/ROS: Patient seen and examined at bedside. Patient's pain is improving. Patient urinating but not had bowel movement. Patient endorses having hot flashes which is helped with a cold, moist towel on forehead. Endorses tingling in his feet.     Denies Fever/Chills, HA, CP, SOB, n/v, changes in bowel/urinary habits.  12pt ROS otherwise negative.    VITALS  Vital Signs Last 24 Hrs  T(C): 36.8 (09 Aug 2022 15:07), Max: 36.8 (09 Aug 2022 15:07)  T(F): 98.3 (09 Aug 2022 15:07), Max: 98.3 (09 Aug 2022 15:07)  HR: 88 (09 Aug 2022 15:07) (88 - 105)  BP: 107/69 (09 Aug 2022 15:07) (107/69 - 170/84)  BP(mean): --  RR: 18 (09 Aug 2022 15:07) (18 - 19)  SpO2: 95% (09 Aug 2022 15:07) (93% - 95%)    Parameters below as of 09 Aug 2022 15:07  Patient On (Oxygen Delivery Method): nasal cannula  O2 Flow (L/min): 2      CAPILLARY BLOOD GLUCOSE      POCT Blood Glucose.: 175 mg/dL (09 Aug 2022 12:08)  POCT Blood Glucose.: 212 mg/dL (09 Aug 2022 09:23)  POCT Blood Glucose.: 190 mg/dL (08 Aug 2022 21:34)  POCT Blood Glucose.: 184 mg/dL (08 Aug 2022 19:01)      PHYSICAL EXAM  General: Slightly lethargic appearing   Head: NC/AT; MMM, moist towel on forehead  Neck: Supple; no JVD  Respiratory: CTAB; no wheezes/rales/rhonchi  Cardiovascular: Regular rhythm/rate; S1/S2+, no murmurs, rubs gallops   Gastrointestinal: Abdomen slightly distended, nontender to palpation diffusely - only mild tenderness on palpation to RUQ, normal bowel sounds   Extremities: WWP; no edema/cyanosis  Neurological: A&Ox2; no obvious focal deficits    MEDICATIONS  (STANDING):  atorvastatin 40 milliGRAM(s) Oral at bedtime  carbidopa/levodopa  25/250 0.5 Tablet(s) Oral four times a day  cefTRIAXone   IVPB 1000 milliGRAM(s) IV Intermittent every 24 hours  dextrose 5%. 1000 milliLiter(s) (50 mL/Hr) IV Continuous <Continuous>  dextrose 5%. 1000 milliLiter(s) (100 mL/Hr) IV Continuous <Continuous>  dextrose 50% Injectable 25 Gram(s) IV Push once  dextrose 50% Injectable 12.5 Gram(s) IV Push once  dextrose 50% Injectable 25 Gram(s) IV Push once  glucagon  Injectable 1 milliGRAM(s) IntraMuscular once  heparin   Injectable 5000 Unit(s) SubCutaneous every 8 hours  insulin lispro (ADMELOG) corrective regimen sliding scale   SubCutaneous Before meals and at bedtime  lactated ringers. 1000 milliLiter(s) (100 mL/Hr) IV Continuous <Continuous>  lisinopril 40 milliGRAM(s) Oral daily  LORazepam     Tablet 1 milliGRAM(s) Oral every 12 hours  metroNIDAZOLE  IVPB      metroNIDAZOLE  IVPB 500 milliGRAM(s) IV Intermittent every 8 hours  polyethylene glycol 3350 17 Gram(s) Oral every 24 hours  potassium chloride   Powder 40 milliEquivalent(s) Oral once  senna 1 Tablet(s) Oral every 24 hours    MEDICATIONS  (PRN):  dextrose Oral Gel 15 Gram(s) Oral once PRN Blood Glucose LESS THAN 70 milliGRAM(s)/deciliter      No Known Allergies      LABS                        11.8   9.82  )-----------( 161      ( 09 Aug 2022 09:14 )             36.5     08-09    145  |  107  |  19  ----------------------------<  189<H>  3.1<L>   |  29  |  0.55    Ca    8.2<L>      09 Aug 2022 09:14  Phos  3.1     08-09  Mg     1.9     08-09    TPro  5.6<L>  /  Alb  2.6<L>  /  TBili  0.3  /  DBili  x   /  AST  44<H>  /  ALT  5<L>  /  AlkPhos  193<H>  08-09                IMAGING/EKG/ETC

## 2022-08-09 NOTE — DISCHARGE NOTE PROVIDER - NSDCMRMEDTOKEN_GEN_ALL_CORE_FT
aspirin 81 mg oral tablet: 1 tab(s) orally once a day  carbidopa-levodopa 25 mg-250 mg oral tablet: 0.5 tab(s) orally 4 times a day  Centrum oral tablet: 1 tab(s) orally once a day  Crestor 20 mg oral tablet: 1 tab(s) orally once a day (at bedtime)  doxepin 10 mg oral capsule: 1 cap(s) orally once a day  Can give up to 2 caps per day  Fish Oil oral capsule: 1 cap(s) orally once a day  LORAZEPAM  1 MG TABS: 1 tab(s) orally 4 times a day  metFORMIN 750 mg oral tablet, extended release: 2 tab(s) orally once a day  NIFEdipine 30 mg oral tablet, extended release: 1 tab(s) orally once a day  OLANZapine 7.5 mg oral tablet: 1 tab(s) orally once a day (at bedtime)  quinapril 40 mg oral tablet: 1 tab(s) orally once a day  Santyl 250 units/g topical ointment: Apply topically to affected area once a day  Vitamin B12 1000 mcg oral tablet: 1 tab(s) orally once a day  Vitamin D3 25 mcg (1000 intl units) oral tablet: 1 tab(s) orally once a day

## 2022-08-09 NOTE — CONSULT NOTE ADULT - SUBJECTIVE AND OBJECTIVE BOX
82M PMH Parkinson's, HTN, HLD, DM, BIBEMS from home after HHA noted he was pale and weak, admitted for sepsis w/ suspected CAP (now unlikely source) and stroke code called but negative w/ moderate R ICA stenosis started on ASA/plavix and continued sepsis w/u. General surgery following for finding of hepatic flexure colitis on CT scan along w/ abdominal pain on exam. CT and HIDA equivocal for colitis vs malignancy w/ reactive gallbladder inflammation vs cholecystitis w/ reactive colitis and RUQ US showing GBWT and PCCF suggestive of possible cholecystitis. IR consulted by surgery for percutaneous cholecystostomy tube placement.     Clinical History: WEAKNESS    No pertinent family history in first degree relatives    Family history of stroke    Handoff    MEWS Score    DM2 (diabetes mellitus, type 2)    Parkinsons    Hypertension    Hyperlipidemia    Depression    Melanoma    Depression    Tinnitus of left ear    Vertigo    Melanoma    Depression    Tinnitus of left ear    Vertigo    Community acquired pneumonia    Severe sepsis    Prophylactic measure    Parkinsons    Pneumonia, aspiration    Metabolic encephalopathy    WILL (acute kidney injury)    DM (diabetes mellitus)    Suspected pulmonary embolism    Right lower quadrant abdominal tenderness with rebound tenderness    No significant past surgical history    Stroke    No significant past surgical history    No significant past surgical history    WEAKNESS    90+    WILL (acute kidney injury)    Other specified sepsis    [email or web address]        Meds:aspirin  chewable 81 milliGRAM(s) Oral daily  atorvastatin 40 milliGRAM(s) Oral at bedtime  carbidopa/levodopa  25/250 0.5 Tablet(s) Oral four times a day  cefTRIAXone   IVPB 1000 milliGRAM(s) IV Intermittent every 24 hours  dextrose 5%. 1000 milliLiter(s) IV Continuous <Continuous>  dextrose 5%. 1000 milliLiter(s) IV Continuous <Continuous>  dextrose 50% Injectable 25 Gram(s) IV Push once  dextrose 50% Injectable 12.5 Gram(s) IV Push once  dextrose 50% Injectable 25 Gram(s) IV Push once  dextrose Oral Gel 15 Gram(s) Oral once PRN  glucagon  Injectable 1 milliGRAM(s) IntraMuscular once  heparin   Injectable 5000 Unit(s) SubCutaneous every 8 hours  insulin lispro (ADMELOG) corrective regimen sliding scale   SubCutaneous Before meals and at bedtime  lactated ringers. 1000 milliLiter(s) IV Continuous <Continuous>  lisinopril 40 milliGRAM(s) Oral daily  LORazepam     Tablet 1 milliGRAM(s) Oral every 12 hours  metroNIDAZOLE  IVPB      metroNIDAZOLE  IVPB 500 milliGRAM(s) IV Intermittent every 8 hours  polyethylene glycol 3350 17 Gram(s) Oral every 24 hours  senna 1 Tablet(s) Oral every 24 hours      Allergies:No Known Allergies        Labs:                           11.8   9.82  )-----------( 161      ( 09 Aug 2022 09:14 )             36.5       08-09    145  |  107  |  19  ----------------------------<  189<H>  3.1<L>   |  29  |  0.55    Ca    8.2<L>      09 Aug 2022 09:14  Phos  3.1     08-09  Mg     1.9     08-09    TPro  5.6<L>  /  Alb  2.6<L>  /  TBili  0.3  /  DBili  x   /  AST  44<H>  /  ALT  5<L>  /  AlkPhos  193<H>  08-09          Imaging Findings: reviewed

## 2022-08-09 NOTE — PROGRESS NOTE ADULT - ASSESSMENT
82M PMH Parkinson's, HTN, HLD, DM BIBEMS from home after HHA noted he was pale and weak, admitted for sepsis w/ suspected CAP (now unlikely source) and stroke code called but negative w/ moderate R ICA stenosis started on ASA/plavix and continued sepsis w/u. General surgery consulted for finding of hepatic flexure colitis on CT scan along w/ abdominal pain on exam. CT scan equivocal for colitis vs malignancy w/ reactive gallbladder inflammation vs cholecystitis w/ reactive colitis and RUQ US showing GBWT and PCCF suggestive of possible cholecystitis. HIDA scan performed yesterday showed nonvisualization of the gallbladder. GI consult determined likely acute judy primary and is deferring inpatient Cscope due to possibility of R sided diverticulitis, and recommending interval follow up. While primary colitis/malignancy is not entirely ruled out, preponderance of evidence is to acute cholecystitis as primary insult. However, pt is a poor surgical candidate secondary to age and medical comorbidities.    No acute surgical intervention  Recommend IR consult for Perc Judy  Continue abx  Rest of plan per primary

## 2022-08-09 NOTE — PROGRESS NOTE ADULT - PROBLEM SELECTOR PLAN 2
Patient with some confusion from baseline per HHA - improved, likely 2/2 infection  Stroke code called at [facility] - per telestroke doctor was negative however imaging showed: Mild to moderate stenosis of the bilateral intracranial vertebral arteries (left greater than right). Moderate stenosis the proximal right ICA. Mild stenosis the proximal left ICA.  - Stroke consulted:   MR brain w/o contrast - negative    Carotid US b/l to confirm degree of stenosis - f/u as outpatient

## 2022-08-09 NOTE — PROGRESS NOTE ADULT - ASSESSMENT
81 yo M, PMHx of Parkinson Disease, HTN, DM, HLD   Presents when HHA called EMS for pale appearance / hypotension , being treated in [facility] for ?PNA, s/p Code Stroke in ED  A CT scan showed colitis as well as ?Cholecystitis , for which GI was consulted.    #Abnormal CT Scan  #Cholecystitis  #?PNA  #?Reactive Colitis  #Normocytic Anemia  #Rectal Thickening  #Constipation s/p Enema  #Diverticulitis    CT Scan: The gallbladder is distended and thick-walled with pericholecystic fat infiltration. No obvious radiopaque stones within the gallbladder. Probable   hypervascularity seen in segment five and segment 4B of liver around the gallbladder. In addition there is quite a long segment of wall thickening   with pericolic fat infiltration involving the hepatic flexure of colon.     Prime consideration would be acute cholecystitis with contiguous reactive wall thickening of the hepatic flexure of colon rather than acute colonic diverticulitis involving the hepatic flecture with contiguous involvement of the gallbladder.     Normal appendix. Colonic diverticulosis. Distended rectum with transverse diameter measuring 9.2 cm. Rectal wall thickening.   Rectum and sigmoid are distended and packed with fecal material.     Recommendations:  Further Imaging for GB per Surgical Services  Please maintain patient on bowel regimen s/p Enema  Likely Cholecystitis is primary insult , suspect Colitis secondary  Given there is possibility of diverticulitis, will defer on inpatient colonoscopy. Will need interval colonoscopy in 6-8 weeks  f/u stool studies    Thank you for allowing us to participate in the care of this patient. Please call us if you have any further questions or concerns    Melo Medina M.D.  Gastroenterology Fellow  Pager: [phone]   83 yo M, PMHx of Parkinson Disease, HTN, DM, HLD   Presents when HHA called EMS for pale appearance / hypotension , being treated in [facility] for ?PNA, s/p Code Stroke in ED  A CT scan showed colitis as well as ?Cholecystitis , for which GI was consulted.    #Abnormal CT Scan  #Cholecystitis  #?PNA  #?Reactive Colitis  #Normocytic Anemia  #Rectal Thickening  #Constipation s/p Enema  #Diverticulitis    CT Scan: The gallbladder is distended and thick-walled with pericholecystic fat infiltration. No obvious radiopaque stones within the gallbladder. Probable   hypervascularity seen in segment five and segment 4B of liver around the gallbladder. In addition there is quite a long segment of wall thickening   with pericolic fat infiltration involving the hepatic flexure of colon.     Prime consideration would be acute cholecystitis with contiguous reactive wall thickening of the hepatic flexure of colon rather than acute colonic diverticulitis involving the hepatic flecture with contiguous involvement of the gallbladder.     Normal appendix. Colonic diverticulosis. Distended rectum with transverse diameter measuring 9.2 cm. Rectal wall thickening.   Rectum and sigmoid are distended and packed with fecal material.     Recommendations:  Further Imaging for GB per Surgical Services  Please maintain patient on bowel regimen s/p Enema  Likely Cholecystitis is primary insult , suspect Colitis secondary  Please maintain the patient on antibiotics, CTX and Flagyl acceptable  Given there is possibility of diverticulitis, will defer on inpatient colonoscopy. Will need interval colonoscopy in 6-8 weeks  f/u stool studies    Thank you for allowing us to participate in the care of this patient. Please call us if you have any further questions or concerns    Melo Medina M.D.  Gastroenterology Fellow  Pager: [phone]

## 2022-08-09 NOTE — DISCHARGE NOTE PROVIDER - NSDCFUSCHEDAPPT_GEN_ALL_CORE_FT
[facility]  PSYCHIATRY  [id number]  Scheduled Appointment: 08/11/2022    Alexandre Roman  [facility]  PSYCHIATRY  [address]  Scheduled Appointment: 08/15/2022

## 2022-08-09 NOTE — PROGRESS NOTE ADULT - SUBJECTIVE AND OBJECTIVE BOX
GASTROENTEROLOGY PROGRESS NOTE  Patient seen and examined at bedside. Denies abd pain on my interview this am. Believes he had a normal BM as well. HHA at bedside.    PERTINENT REVIEW OF SYSTEMS:  CONSTITUTIONAL: No weakness, fevers or chills  HEENT: No visual changes; No vertigo or throat pain   GASTROINTESTINAL: As above.  NEUROLOGICAL: No numbness or weakness  SKIN: No itching, burning, rashes, or lesions     Allergies    No Known Allergies    Intolerances      MEDICATIONS:  MEDICATIONS  (STANDING):  aspirin  chewable 81 milliGRAM(s) Oral daily  atorvastatin 40 milliGRAM(s) Oral at bedtime  carbidopa/levodopa  25/250 0.5 Tablet(s) Oral four times a day  cefTRIAXone   IVPB 1000 milliGRAM(s) IV Intermittent every 24 hours  dextrose 5%. 1000 milliLiter(s) (100 mL/Hr) IV Continuous <Continuous>  dextrose 5%. 1000 milliLiter(s) (50 mL/Hr) IV Continuous <Continuous>  dextrose 50% Injectable 25 Gram(s) IV Push once  dextrose 50% Injectable 12.5 Gram(s) IV Push once  dextrose 50% Injectable 25 Gram(s) IV Push once  glucagon  Injectable 1 milliGRAM(s) IntraMuscular once  heparin   Injectable 5000 Unit(s) SubCutaneous every 8 hours  insulin lispro (ADMELOG) corrective regimen sliding scale   SubCutaneous Before meals and at bedtime  lactated ringers. 1000 milliLiter(s) (100 mL/Hr) IV Continuous <Continuous>  LORazepam     Tablet 1 milliGRAM(s) Oral every 12 hours  metroNIDAZOLE  IVPB      metroNIDAZOLE  IVPB 500 milliGRAM(s) IV Intermittent every 8 hours  polyethylene glycol 3350 17 Gram(s) Oral every 24 hours  senna 1 Tablet(s) Oral every 24 hours    MEDICATIONS  (PRN):  dextrose Oral Gel 15 Gram(s) Oral once PRN Blood Glucose LESS THAN 70 milliGRAM(s)/deciliter    Vital Signs Last 24 Hrs  T(C): 36.4 (09 Aug 2022 05:40), Max: 37.3 (08 Aug 2022 13:51)  T(F): 97.6 (09 Aug 2022 05:40), Max: 99.1 (08 Aug 2022 13:51)  HR: 105 (09 Aug 2022 05:40) (100 - 105)  BP: 170/84 (09 Aug 2022 05:40) (134/77 - 170/84)  BP(mean): --  RR: 19 (09 Aug 2022 05:40) (18 - 19)  SpO2: 94% (09 Aug 2022 05:40) (91% - 94%)    Parameters below as of 09 Aug 2022 05:40  Patient On (Oxygen Delivery Method): nasal cannula        PHYSICAL EXAM:    General: lying in bed, in no acute distress, resting tremor  HEENT: MMM, conjunctiva and sclera clear  Gastrointestinal: Soft, non-tender, mildly distended; No rebound or guarding  Skin: Warm and dry. No obvious rash    LABS:                        11.8   9.82  )-----------( 161      ( 09 Aug 2022 09:14 )             36.5     08-09    145  |  107  |  19  ----------------------------<  189<H>  3.1<L>   |  29  |  0.55    Ca    8.2<L>      09 Aug 2022 09:14  Phos  3.1     08-09  Mg     1.9     08-09    TPro  5.6<L>  /  Alb  2.6<L>  /  TBili  0.3  /  DBili  x   /  AST  44<H>  /  ALT  5<L>  /  AlkPhos  193<H>  08-09    RADIOLOGY & ADDITIONAL STUDIES:  Reviewed

## 2022-08-10 NOTE — PROGRESS NOTE ADULT - SUBJECTIVE AND OBJECTIVE BOX
SUBJECTIVE: Doing well this AM. NAEON. Denies n/v. Endorses f/bm. Denies cp/sob. Pain is well controlled. Ambulating as tolerated. Tolerating diet.      MEDICATIONS  (STANDING):  atorvastatin 40 milliGRAM(s) Oral at bedtime  carbidopa/levodopa  25/250 0.5 Tablet(s) Oral four times a day  cefTRIAXone   IVPB 1000 milliGRAM(s) IV Intermittent every 24 hours  dextrose 5%. 1000 milliLiter(s) (100 mL/Hr) IV Continuous <Continuous>  dextrose 5%. 1000 milliLiter(s) (50 mL/Hr) IV Continuous <Continuous>  dextrose 50% Injectable 25 Gram(s) IV Push once  dextrose 50% Injectable 12.5 Gram(s) IV Push once  dextrose 50% Injectable 25 Gram(s) IV Push once  glucagon  Injectable 1 milliGRAM(s) IntraMuscular once  heparin   Injectable 5000 Unit(s) SubCutaneous every 8 hours  insulin lispro (ADMELOG) corrective regimen sliding scale   SubCutaneous Before meals and at bedtime  lactated ringers. 1000 milliLiter(s) (100 mL/Hr) IV Continuous <Continuous>  lisinopril 40 milliGRAM(s) Oral daily  LORazepam     Tablet 1 milliGRAM(s) Oral every 12 hours  metroNIDAZOLE  IVPB      metroNIDAZOLE  IVPB 500 milliGRAM(s) IV Intermittent every 8 hours  polyethylene glycol 3350 17 Gram(s) Oral every 24 hours  potassium chloride    Tablet ER 40 milliEquivalent(s) Oral once  potassium chloride  10 mEq/100 mL IVPB 10 milliEquivalent(s) IV Intermittent every 1 hour  senna 1 Tablet(s) Oral every 24 hours    MEDICATIONS  (PRN):  dextrose Oral Gel 15 Gram(s) Oral once PRN Blood Glucose LESS THAN 70 milliGRAM(s)/deciliter      Vital Signs Last 24 Hrs  T(C): 36.9 (10 Aug 2022 05:30), Max: 36.9 (09 Aug 2022 20:30)  T(F): 98.5 (10 Aug 2022 05:30), Max: 98.5 (10 Aug 2022 05:30)  HR: 91 (10 Aug 2022 05:30) (88 - 99)  BP: 151/70 (10 Aug 2022 05:30) (107/69 - 151/70)  BP(mean): --  RR: 18 (10 Aug 2022 05:30) (18 - 18)  SpO2: 95% (10 Aug 2022 05:30) (92% - 95%)    Parameters below as of 10 Aug 2022 05:30  Patient On (Oxygen Delivery Method): nasal cannula        Physical Exam:  General: NAD, resting comfortably in bed  Pulmonary: Nonlabored breathing, no respiratory distress, NC  Cardiovascular: NSR  Abdominal: soft, NT/ND  Extremities: WWP, normal strength  Neuro: A/O x 3, CNs II-XII grossly intact, no focal deficits    I&O's Summary      LABS:                        12.4   8.80  )-----------( 197      ( 10 Aug 2022 07:24 )             37.6     08-10    145  |  107  |  16  ----------------------------<  191<H>  3.0<L>   |  27  |  0.51    Ca    8.3<L>      10 Aug 2022 07:24  Phos  3.1     08-10  Mg     2.0     08-10    TPro  5.8<L>  /  Alb  2.5<L>  /  TBili  0.4  /  DBili  x   /  AST  35  /  ALT  17  /  AlkPhos  102  08-10        CAPILLARY BLOOD GLUCOSE      POCT Blood Glucose.: 181 mg/dL (10 Aug 2022 08:25)  POCT Blood Glucose.: 174 mg/dL (09 Aug 2022 22:14)  POCT Blood Glucose.: 173 mg/dL (09 Aug 2022 17:08)  POCT Blood Glucose.: 175 mg/dL (09 Aug 2022 12:08)    LIVER FUNCTIONS - ( 10 Aug 2022 07:24 )  Alb: 2.5 g/dL / Pro: 5.8 g/dL / ALK PHOS: 102 U/L / ALT: 17 U/L / AST: 35 U/L / GGT: x             RADIOLOGY & ADDITIONAL STUDIES:

## 2022-08-10 NOTE — PROGRESS NOTE ADULT - PROBLEM SELECTOR PLAN 9
F: s/p 1 L  E: replete as needed  N: Soft diet   DVT ppx: heparin subq
F: none  E: replete as needed  N: Soft diet   DVT ppx: heparin subq.    Dispo: [facility]

## 2022-08-10 NOTE — PROGRESS NOTE ADULT - SUBJECTIVE AND OBJECTIVE BOX
Physical Medicine and Rehabilitation Progress Note :    Patient is a 82y old  Male who presents with a chief complaint of weakness (10 Aug 2022 11:32)      HPI:  81yo M with h/o Parkinsons, HTN, DM, HLD presents after the home health aid called EMS. The HHA at bedside states he called EMS because the patient looked extremely pale and had a blood pressure of 77/60. Patient states that the blood pressure did not get better after he took all his medications. HHA states patient was not able to walk in the past few days and it was due to worsening weakness. His appetite was unchanged. He complains of recent coughing fits over the past few months and now he states that he has a bunch of mucus in his mouth but otherwise feels totally fine. Per HHA patient is now at baseline and the only difference is he is breathing a little heavier. Patients last bowel movement was 4 days ago which he states is standard for him and has no other complaints at this time.    In the ED:  Vitals: Temp 98.3 HR 94 /78 O2 Sat 96%  Labs: WBC 18 Hg 13.9 Plt 168 PTT 32 PT 16 INR 1.39 Lactate 3.3, Na 139 K 3.6 Cl 103 Bicarb 26 AG 10 Cr 1.75 Glucose 313Calcium 8.9   Imaging:  Intervention: Stroke Code called - deemed negative. Azithromycin 500, Ceftriaxone 1g, heparin 5000 q8, 1 L NS   (05 Aug 2022 03:20)                            12.4   8.80  )-----------( 197      ( 10 Aug 2022 07:24 )             37.6       08-10    145  |  107  |  16  ----------------------------<  191<H>  3.0<L>   |  27  |  0.51    Ca    8.3<L>      10 Aug 2022 07:24  Phos  3.1     08-10  Mg     2.0     08-10    TPro  5.8<L>  /  Alb  2.5<L>  /  TBili  0.4  /  DBili  x   /  AST  35  /  ALT  17  /  AlkPhos  102  08-10    Vital Signs Last 24 Hrs  T(C): 37.3 (10 Aug 2022 12:25), Max: 37.3 (10 Aug 2022 12:25)  T(F): 99.2 (10 Aug 2022 12:25), Max: 99.2 (10 Aug 2022 12:25)  HR: 100 (10 Aug 2022 12:25) (88 - 100)  BP: 158/98 (10 Aug 2022 12:25) (107/69 - 158/98)  BP(mean): --  RR: 18 (10 Aug 2022 12:25) (18 - 18)  SpO2: 91% (10 Aug 2022 12:25) (91% - 95%)    Parameters below as of 10 Aug 2022 12:25  Patient On (Oxygen Delivery Method): nasal cannula  O2 Flow (L/min): 2      MEDICATIONS  (STANDING):  atorvastatin 40 milliGRAM(s) Oral at bedtime  carbidopa/levodopa  25/250 0.5 Tablet(s) Oral four times a day  cefTRIAXone   IVPB 1000 milliGRAM(s) IV Intermittent every 24 hours  dextrose 5%. 1000 milliLiter(s) (100 mL/Hr) IV Continuous <Continuous>  dextrose 5%. 1000 milliLiter(s) (50 mL/Hr) IV Continuous <Continuous>  dextrose 50% Injectable 25 Gram(s) IV Push once  dextrose 50% Injectable 12.5 Gram(s) IV Push once  dextrose 50% Injectable 25 Gram(s) IV Push once  glucagon  Injectable 1 milliGRAM(s) IntraMuscular once  heparin   Injectable 5000 Unit(s) SubCutaneous every 8 hours  insulin lispro (ADMELOG) corrective regimen sliding scale   SubCutaneous Before meals and at bedtime  lactated ringers. 1000 milliLiter(s) (100 mL/Hr) IV Continuous <Continuous>  lisinopril 40 milliGRAM(s) Oral daily  LORazepam     Tablet 1 milliGRAM(s) Oral every 12 hours  metroNIDAZOLE  IVPB      metroNIDAZOLE  IVPB 500 milliGRAM(s) IV Intermittent every 8 hours  polyethylene glycol 3350 17 Gram(s) Oral every 24 hours  potassium chloride  10 mEq/100 mL IVPB 10 milliEquivalent(s) IV Intermittent every 1 hour  senna 1 Tablet(s) Oral every 24 hours    MEDICATIONS  (PRN):  dextrose Oral Gel 15 Gram(s) Oral once PRN Blood Glucose LESS THAN 70 milliGRAM(s)/deciliter    Currently Undergoing Physical/ Occupational Therapy at bedside    PT Functional Status Assessment :         Cognitive/Neuro/Behavioral  Cognitive/Neuro/Behavioral [WDL Definition: Alert; opens eyes spontaneously; arouses to voice or touch; oriented x 4; follows commands; speech spontaneous, logical; purposeful motor response; behavior appropriate to situation]: WDL except  Level of Consciousness: lethargic;  confused  Arousal Level: arouses to touch/gentle shaking  Orientation: disoriented to;  place;  time  Speech: hypophonic  Mood/Behavior: calm    Language Assistance  Preferred Language to Address Healthcare Preferred Language to Address Healthcare: English    Therapeutic Interventions      Bed Mobility  Bed Mobility Training Rolling/Turning: moderate assist (50% patient effort);  2 person assist;  verbal cues;  bed rails  Bed Mobility Training Scooting: moderate assist (50% patient effort);  2 person assist;  verbal cues  Bed Mobility Training Sit-to-Supine: moderate assist (50% patient effort);  2 person assist;  verbal cues  Bed Mobility Training Supine-to-Sit: moderate assist (50% patient effort);  2 person assist;  verbal cues  Bed Mobility Training Limitations: decreased ability to use legs for bridging/pushing;  decreased ability to use arms for pushing/pulling;  impaired ability to control trunk for mobility;  impaired balance;  decreased strength;  reduced functional endurance    Sit-Stand Transfer Training  Transfer Training Sit-to-Stand Transfer: moderate assist (50% patient effort);  2 person assist;  verbal cues;  full weight-bearing   rolling walker  Comment: Patient performed 4x STS attempts at EOB, all required mod-Ax2 with increased verbal and tactile cuing to attain upright posture. Patient unable to attain full upright posture during all attempts. Patient demonstrating increased rigidity this date.  Transfer Training Stand-to-Sit Transfer: moderate assist (50% patient effort);  2 person assist;  verbal cues;  full weight-bearing   rolling walker  Sit-to-Stand Transfer Training Transfer Safety Analysis: decreased balance;  decreased weight-shifting ability;  unable to attain full upright posture;  impaired balance;  decreased strength;  reduced functional endurance;  rolling walker            PM&R Impression : as above    Current Disposition Plan Recommendations :    subacute rehab placement

## 2022-08-10 NOTE — PROGRESS NOTE ADULT - ASSESSMENT
82M PMH Parkinson's, HTN, HLD, DM BIBEMS from home after HHA noted he was pale and weak, admitted for sepsis w/ suspected CAP (now unlikely source) and stroke code called but negative w/ moderate R ICA stenosis started on ASA/plavix and continued sepsis w/u. General surgery consulted for finding of hepatic flexure colitis on CT scan along w/ abdominal pain on exam. CT scan equivocal for colitis vs malignancy w/ reactive gallbladder inflammation vs cholecystitis w/ reactive colitis and RUQ US showing GBWT and PCCF suggestive of possible cholecystitis. HIDA scan performed yesterday showed nonvisualization of the gallbladder. GI consult determined likely acute judy primary and is deferring inpatient Cscope due to possibility of R sided diverticulitis, and recommending interval follow up. While primary colitis/malignancy is not entirely ruled out, preponderance of evidence is to acute cholecystitis as primary insult. However, pt is a poor surgical candidate secondary to  medical comorbidities.    No acute surgical intervention  f/u IR recs  Continue abx  Rest of plan per primary

## 2022-08-10 NOTE — PROGRESS NOTE ADULT - ASSESSMENT
81yo M with h/o Parkinsons, HTN, DM, HLD presents with hypotension and admitted for severe sepsis 2/2 CAP with possible right sided abdominal inflammation seen on CTA chest, found to have evidence of Colitis and Cholecystitis. Patient is being treated with CTX and Flagyl and GI and Surgery are following. IR consulted for potential percutaneous cholecystostomy.

## 2022-08-10 NOTE — PROGRESS NOTE ADULT - PROBLEM SELECTOR PROBLEM 8
Prophylactic measure
Hypertension
Prophylactic measure
Prophylactic measure
Hypertension
Prophylactic measure

## 2022-08-10 NOTE — PROGRESS NOTE ADULT - SUBJECTIVE AND OBJECTIVE BOX
**incomplete    OVERNIGHT EVENTS:    SUBJECTIVE / INTERVAL HPI: Patient seen and examined at bedside.     VITAL SIGNS:  Vital Signs Last 24 Hrs  T(C): 36.9 (10 Aug 2022 05:30), Max: 36.9 (09 Aug 2022 20:30)  T(F): 98.5 (10 Aug 2022 05:30), Max: 98.5 (10 Aug 2022 05:30)  HR: 91 (10 Aug 2022 05:30) (88 - 99)  BP: 151/70 (10 Aug 2022 05:30) (107/69 - 151/70)  BP(mean): --  RR: 18 (10 Aug 2022 05:30) (18 - 18)  SpO2: 95% (10 Aug 2022 05:30) (92% - 95%)    Parameters below as of 10 Aug 2022 05:30  Patient On (Oxygen Delivery Method): nasal cannula        PHYSICAL EXAM:    General: alert, in no acute distress  HEENT: NC/AT; PERRL, anicteric sclera; MMM  Neck: supple  Cardiovascular: +S1/S2, RRR  Respiratory: CTA B/L; no W/R/R  Gastrointestinal: soft, NT/ND; +BSx4  Extremities: WWP; no edema, clubbing or cyanosis  Vascular: 2+ radial, DP/PT pulses B/L  Neurological: AAOx3; no focal deficits    MEDICATIONS:  MEDICATIONS  (STANDING):  atorvastatin 40 milliGRAM(s) Oral at bedtime  carbidopa/levodopa  25/250 0.5 Tablet(s) Oral four times a day  cefTRIAXone   IVPB 1000 milliGRAM(s) IV Intermittent every 24 hours  dextrose 5%. 1000 milliLiter(s) (50 mL/Hr) IV Continuous <Continuous>  dextrose 5%. 1000 milliLiter(s) (100 mL/Hr) IV Continuous <Continuous>  dextrose 50% Injectable 25 Gram(s) IV Push once  dextrose 50% Injectable 12.5 Gram(s) IV Push once  dextrose 50% Injectable 25 Gram(s) IV Push once  glucagon  Injectable 1 milliGRAM(s) IntraMuscular once  heparin   Injectable 5000 Unit(s) SubCutaneous every 8 hours  insulin lispro (ADMELOG) corrective regimen sliding scale   SubCutaneous Before meals and at bedtime  lactated ringers. 1000 milliLiter(s) (100 mL/Hr) IV Continuous <Continuous>  lisinopril 40 milliGRAM(s) Oral daily  LORazepam     Tablet 1 milliGRAM(s) Oral every 12 hours  metroNIDAZOLE  IVPB      metroNIDAZOLE  IVPB 500 milliGRAM(s) IV Intermittent every 8 hours  polyethylene glycol 3350 17 Gram(s) Oral every 24 hours  senna 1 Tablet(s) Oral every 24 hours    MEDICATIONS  (PRN):  dextrose Oral Gel 15 Gram(s) Oral once PRN Blood Glucose LESS THAN 70 milliGRAM(s)/deciliter      ALLERGIES:  Allergies    No Known Allergies    Intolerances        LABS:                        11.8   9.82  )-----------( 161      ( 09 Aug 2022 09:14 )             36.5     08-09    145  |  107  |  19  ----------------------------<  189<H>  3.1<L>   |  29  |  0.55    Ca    8.2<L>      09 Aug 2022 09:14  Phos  3.1     08-09  Mg     1.9     08-09    TPro  5.6<L>  /  Alb  2.6<L>  /  TBili  0.3  /  DBili  x   /  AST  44<H>  /  ALT  5<L>  /  AlkPhos  193<H>  08-09        CAPILLARY BLOOD GLUCOSE      POCT Blood Glucose.: 174 mg/dL (09 Aug 2022 22:14)      RADIOLOGY & ADDITIONAL TESTS: Reviewed. **incomplete    OVERNIGHT EVENTS:  NAEON  SUBJECTIVE / INTERVAL HPI: Patient seen and examined at bedside. This morning he has no complaints. Denies abdominal pain, nausea, vomiting. Per nurse he has increase in tremor of his upper extremities compared to yesterday.     VITAL SIGNS:  Vital Signs Last 24 Hrs  T(C): 36.9 (10 Aug 2022 05:30), Max: 36.9 (09 Aug 2022 20:30)  T(F): 98.5 (10 Aug 2022 05:30), Max: 98.5 (10 Aug 2022 05:30)  HR: 91 (10 Aug 2022 05:30) (88 - 99)  BP: 151/70 (10 Aug 2022 05:30) (107/69 - 151/70)  BP(mean): --  RR: 18 (10 Aug 2022 05:30) (18 - 18)  SpO2: 95% (10 Aug 2022 05:30) (92% - 95%)    Parameters below as of 10 Aug 2022 05:30  Patient On (Oxygen Delivery Method): nasal cannula        PHYSICAL EXAM:    General: alert, in no acute distress  Head: NC/AT; MMM,   Neck: Supple; no JVD  Respiratory: CTAB; no wheezes/rales/rhonchi  Cardiovascular: Regular rhythm/rate; S1/S2+, no murmurs, rubs gallops   Gastrointestinal: Abdomen slightly distended, nontender to palpation diffusely, + bowel sounds;  Extremities: WWP; no edema/cyanosis  Neurological: A&Ox2; no obvious focal deficits      MEDICATIONS:  MEDICATIONS  (STANDING):  atorvastatin 40 milliGRAM(s) Oral at bedtime  carbidopa/levodopa  25/250 0.5 Tablet(s) Oral four times a day  cefTRIAXone   IVPB 1000 milliGRAM(s) IV Intermittent every 24 hours  dextrose 5%. 1000 milliLiter(s) (50 mL/Hr) IV Continuous <Continuous>  dextrose 5%. 1000 milliLiter(s) (100 mL/Hr) IV Continuous <Continuous>  dextrose 50% Injectable 25 Gram(s) IV Push once  dextrose 50% Injectable 12.5 Gram(s) IV Push once  dextrose 50% Injectable 25 Gram(s) IV Push once  glucagon  Injectable 1 milliGRAM(s) IntraMuscular once  heparin   Injectable 5000 Unit(s) SubCutaneous every 8 hours  insulin lispro (ADMELOG) corrective regimen sliding scale   SubCutaneous Before meals and at bedtime  lactated ringers. 1000 milliLiter(s) (100 mL/Hr) IV Continuous <Continuous>  lisinopril 40 milliGRAM(s) Oral daily  LORazepam     Tablet 1 milliGRAM(s) Oral every 12 hours  metroNIDAZOLE  IVPB      metroNIDAZOLE  IVPB 500 milliGRAM(s) IV Intermittent every 8 hours  polyethylene glycol 3350 17 Gram(s) Oral every 24 hours  senna 1 Tablet(s) Oral every 24 hours    MEDICATIONS  (PRN):  dextrose Oral Gel 15 Gram(s) Oral once PRN Blood Glucose LESS THAN 70 milliGRAM(s)/deciliter      ALLERGIES:  Allergies    No Known Allergies    Intolerances        LABS:                        11.8   9.82  )-----------( 161      ( 09 Aug 2022 09:14 )             36.5     08-09    145  |  107  |  19  ----------------------------<  189<H>  3.1<L>   |  29  |  0.55    Ca    8.2<L>      09 Aug 2022 09:14  Phos  3.1     08-09  Mg     1.9     08-09    TPro  5.6<L>  /  Alb  2.6<L>  /  TBili  0.3  /  DBili  x   /  AST  44<H>  /  ALT  5<L>  /  AlkPhos  193<H>  08-09        CAPILLARY BLOOD GLUCOSE      POCT Blood Glucose.: 174 mg/dL (09 Aug 2022 22:14)      RADIOLOGY & ADDITIONAL TESTS: Reviewed. OVERNIGHT EVENTS:  NAEON  SUBJECTIVE / INTERVAL HPI: Patient seen and examined at bedside. This morning he has no complaints. Denies abdominal pain, nausea, vomiting. Per nurse he has increase in tremor of his upper extremities compared to yesterday.     VITAL SIGNS:  Vital Signs Last 24 Hrs  T(C): 36.9 (10 Aug 2022 05:30), Max: 36.9 (09 Aug 2022 20:30)  T(F): 98.5 (10 Aug 2022 05:30), Max: 98.5 (10 Aug 2022 05:30)  HR: 91 (10 Aug 2022 05:30) (88 - 99)  BP: 151/70 (10 Aug 2022 05:30) (107/69 - 151/70)  BP(mean): --  RR: 18 (10 Aug 2022 05:30) (18 - 18)  SpO2: 95% (10 Aug 2022 05:30) (92% - 95%)    Parameters below as of 10 Aug 2022 05:30  Patient On (Oxygen Delivery Method): nasal cannula        PHYSICAL EXAM:    General: alert, in no acute distress  Head: NC/AT; MMM,   Neck: Supple; no JVD  Respiratory: CTAB; no wheezes/rales/rhonchi  Cardiovascular: Regular rhythm/rate; S1/S2+, no murmurs, rubs gallops   Gastrointestinal: Abdomen slightly distended, nontender to palpation diffusely, + bowel sounds;  Extremities: WWP; no edema/cyanosis  Neurological: A&Ox2; no obvious focal deficits      MEDICATIONS:  MEDICATIONS  (STANDING):  atorvastatin 40 milliGRAM(s) Oral at bedtime  carbidopa/levodopa  25/250 0.5 Tablet(s) Oral four times a day  cefTRIAXone   IVPB 1000 milliGRAM(s) IV Intermittent every 24 hours  dextrose 5%. 1000 milliLiter(s) (50 mL/Hr) IV Continuous <Continuous>  dextrose 5%. 1000 milliLiter(s) (100 mL/Hr) IV Continuous <Continuous>  dextrose 50% Injectable 25 Gram(s) IV Push once  dextrose 50% Injectable 12.5 Gram(s) IV Push once  dextrose 50% Injectable 25 Gram(s) IV Push once  glucagon  Injectable 1 milliGRAM(s) IntraMuscular once  heparin   Injectable 5000 Unit(s) SubCutaneous every 8 hours  insulin lispro (ADMELOG) corrective regimen sliding scale   SubCutaneous Before meals and at bedtime  lactated ringers. 1000 milliLiter(s) (100 mL/Hr) IV Continuous <Continuous>  lisinopril 40 milliGRAM(s) Oral daily  LORazepam     Tablet 1 milliGRAM(s) Oral every 12 hours  metroNIDAZOLE  IVPB      metroNIDAZOLE  IVPB 500 milliGRAM(s) IV Intermittent every 8 hours  polyethylene glycol 3350 17 Gram(s) Oral every 24 hours  senna 1 Tablet(s) Oral every 24 hours    MEDICATIONS  (PRN):  dextrose Oral Gel 15 Gram(s) Oral once PRN Blood Glucose LESS THAN 70 milliGRAM(s)/deciliter      ALLERGIES:  Allergies    No Known Allergies    Intolerances        LABS:                        11.8   9.82  )-----------( 161      ( 09 Aug 2022 09:14 )             36.5     08-09    145  |  107  |  19  ----------------------------<  189<H>  3.1<L>   |  29  |  0.55    Ca    8.2<L>      09 Aug 2022 09:14  Phos  3.1     08-09  Mg     1.9     08-09    TPro  5.6<L>  /  Alb  2.6<L>  /  TBili  0.3  /  DBili  x   /  AST  44<H>  /  ALT  5<L>  /  AlkPhos  193<H>  08-09        CAPILLARY BLOOD GLUCOSE      POCT Blood Glucose.: 174 mg/dL (09 Aug 2022 22:14)      RADIOLOGY & ADDITIONAL TESTS: Reviewed.

## 2022-08-10 NOTE — PROGRESS NOTE ADULT - PROBLEM SELECTOR PLAN 5
*now resolved- Cr: 0.51  patient with WILL on arrival  baseline cr 0.9 was at 1.75  improvement seen after 1 L NS  likely 2/2 dehydration  -f/u bmp. *now resolved- Cr: 0.51  patient with WILL on arrival  baseline cr 0.9 was at 1.75  improvement seen after 1 L NS  likely 2/2 dehydration

## 2022-08-10 NOTE — PROGRESS NOTE ADULT - PROBLEM SELECTOR PLAN 6
Patient with hx of parkinsons  seen with pill rolling tremor on exam  has hx of constipation 2/2 parkinsons  bm every 4 days - last one 4 days ago  -c/w bowel regimen  - started on home levodopa/carbidopa (0.5 tabs 4x daily, PO) Patient with hx of parkinsons  seen with pill rolling tremor on exam  has hx of constipation 2/2 parkinsons  bm every 4 days - last one 4 days ago  -c/w bowel regimen  - c/w home levodopa/carbidopa (0.5 tabs 4x daily, PO)

## 2022-08-10 NOTE — PROGRESS NOTE ADULT - PROBLEM SELECTOR PLAN 1
Patient with severe sepsis on presentation: hypotensive, tachycardic, leukocytosis, tachypnea and positive lactate, febrile to 101 rectally on arrival:  Possible source -   1. PNA - Less likely with a CT chest showing only atelectasis.   - CXR with poor inspiratory effort (8/4), Bilateral opacities/left basilar discoid atelectasis (8/5)  - Completed ceftriaxone (2000 mg q24h) 5 day course for pneumonia  - f/u bcx ucx - negative to date   - CTPE found no PE and f/u D-dimer- rule out PE (tachycardic, tachypneic, febrile, in bed)    2. Possible acalculous judy + possible right sided diverticulitis (Had right abdominal tenderness, rebound and guarding- CTPE: No PE. Found evidence of extensive inflammation in right upper quadrant, probable acalculous cholecystitis, less likely right-sided acute diverticulitis)  - US showed evidence of acute cholecystitis  - HIDA scan equivocal for cholecystitis  - IR consulted for percutaneous cholecystostomy  - Discontinued aspirin and plavix for possible IR procedure. Per IR they want to defer for 5 days.  - CT - evidence of colitis - on Metronidazole and Ceftriaxone   - patient started on soft diet per GI recs  - f/u IR and surgery recs Patient with severe sepsis on presentation: hypotensive, tachycardic, leukocytosis, tachypnea and positive lactate, febrile to 101 rectally on arrival:  Possible source -   1. PNA - Less likely with a CT chest showing only atelectasis.   - CXR with poor inspiratory effort (8/4), Bilateral opacities/left basilar discoid atelectasis (8/5)  - Completed ceftriaxone (2000 mg q24h) 5 day course for pneumonia  - f/u bcx ucx - negative to date   - CTPE found no PE and f/u D-dimer- rule out PE (tachycardic, tachypneic, febrile, in bed)    2. Possible acalculous judy + possible right sided diverticulitis (Had right abdominal tenderness, rebound and guarding- CTPE: No PE. Found evidence of extensive inflammation in right upper quadrant, probable acalculous cholecystitis, less likely right-sided acute diverticulitis)  - US showed evidence of acute cholecystitis  - HIDA scan equivocal for cholecystitis  - IR consulted for percutaneous cholecystostomy. Per IR they wanted to defer for 5 days, however due to clinical improvement, they do not feel that urgent percuatneous cholecystostomy is recommended at this time.  - Discontinued aspirin and plavix for possible IR procedure.  - CT - evidence of colitis - on Metronidazole and Ceftriaxone   - patient started on soft diet per GI recs  - f/u IR and surgery recs

## 2022-08-10 NOTE — PROGRESS NOTE ADULT - PROBLEM SELECTOR PLAN 2
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 Moderate stenosis the proximal right ICA. Mild stenosis the proximal left ICA.  - Stroke consulted:   MR brain w/o contrast - negative    Carotid US b/l to confirm degree of stenosis - f/u as outpatient

## 2022-08-10 NOTE — PROGRESS NOTE ADULT - ASSESSMENT
per Internal Medicine    82 y o M with h/o Parkinsons, HTN, DM, HLD presents with hypotension and admitted for severe sepsis 2/2 CAP with possible right sided abdominal inflammation seen on CTA chest, found to have evidence of Colitis and Cholecystitis. Patient is being treated with CTX and Flagyl and GI and Surgery are following. IR consulted for potential percutaneous cholecystostomy.        Problem/Plan - 1:  ·  Problem: Severe sepsis.   ·  Plan: Patient with severe sepsis on presentation: hypotensive, tachycardic, leukocytosis, tachypnea and positive lactate, febrile to 101 rectally on arrival:  Possible source -   1. PNA - Less likely with a CT chest showing only atelectasis.   - CXR with poor inspiratory effort (8/4), Bilateral opacities/left basilar discoid atelectasis (8/5)  - Completed ceftriaxone (2000 mg q24h) 5 day course for pneumonia  - f/u bcx ucx - negative to date   - CTPE found no PE and f/u D-dimer- rule out PE (tachycardic, tachypneic, febrile, in bed)    2. Possible acalculous judy + possible right sided diverticulitis (Had right abdominal tenderness, rebound and guarding- CTPE: No PE. Found evidence of extensive inflammation in right upper quadrant, probable acalculous cholecystitis, less likely right-sided acute diverticulitis)  - US showed evidence of acute cholecystitis  - HIDA scan equivocal for cholecystitis  - IR consulted for percutaneous cholecystostomy  - Discontinued aspirin and plavix for possible IR procedure. Per IR they want to defer for 5 days.  - CT - evidence of colitis - on Metronidazole and Ceftriaxone   - patient started on soft diet per GI recs  - f/u IR and surgery recs.    Problem/Plan - 2:  ·  Problem: Metabolic encephalopathy.   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 Moderate stenosis the proximal right ICA. Mild stenosis the proximal left ICA.  - Stroke consulted:   MR brain w/o contrast - negative    Carotid US b/l to confirm degree of stenosis - f/u as outpatient.    Problem/Plan - 3:  ·  Problem: Pneumonia, aspiration.   ·  Plan: - completed 5 day course of Ceftriaxone   - No overt PNA on CT chest.    Problem/Plan - 4:  ·  Problem: Right lower quadrant abdominal tenderness with rebound tenderness.   ·  Plan: Had right abdominal tenderness, rebound and guarding, CTPE found evidence of probable acalculous cholecystitis, less likely right-sided acute diverticulitis  - see above problem and plan.    Problem/Plan - 5:  ·  Problem: WILL (acute kidney injury).   ·  Plan: *now resolved- Cr: 0.51  patient with WILL on arrival  baseline cr 0.9 was at 1.75  improvement seen after 1 L NS  likely 2/2 dehydration  -f/u bmp.    Problem/Plan - 6:  ·  Problem: Parkinsons.   ·  Plan: Patient with hx of parkinsons  seen with pill rolling tremor on exam  has hx of constipation 2/2 parkinsons  bm every 4 days - last one 4 days ago  -c/w bowel regimen  - started on home levodopa/carbidopa (0.5 tabs 4x daily, PO).    Problem/Plan - 7:  ·  Problem: DM (diabetes mellitus).   ·  Plan: Patient with hx of diabetes  blood glucose in 200s-300s  -monitor fingersticks  -c/w sliding scale.    Problem/Plan - 8:  ·  Problem: Hypertension.   ·  Plan: - patient started on lisinopril 40mg PO (interchange with home quinapril).    Problem/Plan - 9:  ·  Problem: Prophylactic measure.   ·  Plan: F: none  E: replete as needed  N: Soft diet   DVT ppx: heparin subq.

## 2022-08-11 NOTE — CONSULT NOTE ADULT - CONVERSATION DETAILS
In addition to the EM visit, an advance care planning meeting was performed  Start time: 1020am  End time: 1036am  Total time:  16 minutes   A telephone meeting to discuss advance care planning was held today regarding: ALPHONSE NORIEGA  Primary decision maker: Patient is unable to make decisions for himself  Alternate/surrogate: Lonny (HCP)  Discussed advance directives including, but not limited to, healthcare proxy and code status.  Decision regarding code status: DNR  Documentation completed today: MAGGIE in chart    Discussion had with patient HCP over the phone. He stated that the surgical team, offered procedures that he knows that the patient would not want, given his quality of life prior to this hospitalization. He does not want his friend to suffer and wants him to die peacefully and comfortably. He would like him extubated as soon as possible. Emotional support was provided.

## 2022-08-11 NOTE — DIETITIAN INITIAL EVALUATION ADULT - OTHER CALCULATIONS
Fluids per MD team  CBW used to calculate estimated nutrition needs based on Standards of Care given pt <100% IBW (86%). Adjusting for age, BMI, critical care

## 2022-08-11 NOTE — CONSULT NOTE ADULT - SUBJECTIVE AND OBJECTIVE BOX
HPI: Kings Christy is an 83 y/o M with past medical history significant for NIDDM, Parkinson's (Sinemet) c/b functional quadriplegia and dementia (A&O x 1-2), HTN, HLD, Depression who had presented to [facility] on 8/5/22 after his home health aide reported patient was pale with hypotension along with increased weakness. He was admitted with severe sepsis secondary to CAP vs aspiration treated with course of antibiotics. He had persistent fever, found on CT scan to have thickening of ascending colon and hepatic flexure with a distended gallbladder, concern for Colitis and cholecystitis GI was subsequently consulted, agreeing with management of likely cholecystitis as primary insult with colitis as secondary insult. He continued on antibiotic regimen, with improvement back to functional baseline. However, overnight on 8/10 into 8/11/22 he developed acute AMS with oxygen desaturation to 80s on 2L NC. He was upgraded from RMF to 7LA, however patient further decompensated with hypotension and progressing hypoxia. He was emergently intubated for airway protection, given empiric dose of Meropenem due to concern for worsening cholecystitis.       PAST MEDICAL & SURGICAL HISTORY:  DM2 (diabetes mellitus, type 2)  Parkinsons  Hypertension  Hyperlipidemia  Melanoma  Depression  Tinnitus of left ear  Vertigo  No significant past surgical history    Social History:  patient lives alone with 24 hour hha  former smoker (05 Aug 2022 03:20)    Allergies  No Known Allergies      Home Medications:  aspirin 81 mg oral tablet: 1 tab(s) orally once a day (05 Aug 2022 13:47)  carbidopa-levodopa 25 mg-250 mg oral tablet: 0.5 tab(s) orally 4 times a day (05 Aug 2022 13:47)  Centrum oral tablet: 1 tab(s) orally once a day (05 Aug 2022 13:47)  Crestor 20 mg oral tablet: 1 tab(s) orally once a day (at bedtime) (05 Aug 2022 13:47)  doxepin 10 mg oral capsule: 1 cap(s) orally once a day  Can give up to 2 caps per day (05 Aug 2022 13:47)  Fish Oil oral capsule: 1 cap(s) orally once a day (05 Aug 2022 13:47)  LORAZEPAM  1 MG TABS: 1 tab(s) orally 4 times a day (05 Aug 2022 13:47)  metFORMIN 750 mg oral tablet, extended release: 2 tab(s) orally once a day (05 Aug 2022 13:47)  NIFEdipine 30 mg oral tablet, extended release: 1 tab(s) orally once a day (05 Aug 2022 13:47)  OLANZapine 7.5 mg oral tablet: 1 tab(s) orally once a day (at bedtime) (05 Aug 2022 13:47)  quinapril 40 mg oral tablet: 1 tab(s) orally once a day (05 Aug 2022 13:47)  Santyl 250 units/g topical ointment: Apply topically to affected area once a day (05 Aug 2022 13:47)  Vitamin B12 1000 mcg oral tablet: 1 tab(s) orally once a day (05 Aug 2022 13:47)  Vitamin D3 25 mcg (1000 intl units) oral tablet: 1 tab(s) orally once a day (05 Aug 2022 13:47)    MEDICATIONS  (STANDING):  glycopyrrolate Injectable 0.4 milliGRAM(s) IV Push every 4 hours  HYDROmorphone Infusion. 0.5 mG/Hr (2.5 mL/Hr) IV Continuous <Continuous>  norepinephrine Infusion 0.05 MICROgram(s)/kG/Min (2.84 mL/Hr) IV Continuous <Continuous>  propofol Infusion 10 MICROgram(s)/kG/Min (3.63 mL/Hr) IV Continuous <Continuous>    MEDICATIONS  (PRN):  haloperidol    Injectable 1 milliGRAM(s) IntraMuscular every 1 hour PRN terminal agitation  HYDROmorphone  Injectable 1 milliGRAM(s) IV Push every 1 hour PRN dyspnea, RR>25  LORazepam   Injectable 1 milliGRAM(s) IV Push every 1 hour PRN anxiety/agitation      Physical Exam:   General: Well appearing, resting comfortably in bed in no acute distress  Neuro: Grossly intact bilaterally   HEENT: Normocephalic, atraumatic, no JVD, trachea midline   Chest:   Heart: Regular S1/S2, no murmus rubs or gallops    Lungs: Unlabored breathing on ***; Clear to auscultation bilaterally, no adventitious sounds   Abdomen: Soft, non-tender, normoactive bowel sounds, no tenderness to palpation in all 4 quadrants   Upper Extremities: No edema, freely mobile bilaterally   Lower Extremities: No edema, SCDs in place, 2+ DP pulse bilaterally   Skin: Warm, non-diaphoretic throughout       Labs:                         11.8   17.95 )-----------( 391      ( 11 Aug 2022 05:50 )             35.6     08-11    142  |  106  |  26<H>  ----------------------------<  286<H>  3.3<L>   |  24  |  1.00    Ca    8.0<L>      11 Aug 2022 05:50  Phos  3.8     08-11  Mg     1.6     08-11    TPro  5.3<L>  /  Alb  2.4<L>  /  TBili  2.4<H>  /  DBili  x   /  AST  792<H>  /  ALT  107<H>  /  AlkPhos  814<H>  08-11    CAPILLARY BLOOD GLUCOSE  POCT Blood Glucose.: 312 mg/dL (11 Aug 2022 11:40)  POCT Blood Glucose.: 268 mg/dL (11 Aug 2022 07:30)  POCT Blood Glucose.: 218 mg/dL (10 Aug 2022 22:14)  POCT Blood Glucose.: 187 mg/dL (10 Aug 2022 17:50)  POCT Blood Glucose.: 179 mg/dL (10 Aug 2022 12:28)    CARDIAC MARKERS ( 11 Aug 2022 01:46 )  x     / 0.03 ng/mL / 43 U/L / x     / 1.3 ng/mL    PT/INR - ( 11 Aug 2022 05:50 )   PT: 19.4 sec;   INR: 1.62     PTT - ( 11 Aug 2022 05:50 )  PTT:30.2 sec    COVID-19 PCR: NotDetec (04 Aug 2022 14:58)      Vital Signs:   Vital Signs Last 24 Hrs  T(C): 37.2 (11 Aug 2022 09:09), Max: 40 (11 Aug 2022 00:20)  T(F): 98.9 (11 Aug 2022 09:09), Max: 104 (11 Aug 2022 00:20)  HR: 62 (11 Aug 2022 12:00) (62 - 154)  BP: 139/73 (11 Aug 2022 12:00) (60/35 - 195/125)  BP(mean): 100 (11 Aug 2022 12:00) (43 - 139)  RR: 18 (11 Aug 2022 12:00) (12 - 28)  SpO2: 99% (11 Aug 2022 12:00) (90% - 99%)    Parameters below as of 11 Aug 2022 12:00  Patient On (Oxygen Delivery Method): ventilator    O2 Concentration (%): 40  Mode: AC/ CMV (Assist Control/ Continuous Mandatory Ventilation), RR (machine): 12, TV (machine): 500, FiO2: 40, PEEP: 5, ITime: 1, MAP: 8, PIP: 16    Input/Output:   I&O's Detail    10 Aug 2022 07:01  -  11 Aug 2022 07:00  --------------------------------------------------------  IN:    Lactated Ringers Bolus: 1000 mL    Norepinephrine: 499 mL    Propofol: 55 mL  Total IN: 1554 mL    OUT:    Indwelling Catheter - Urethral (mL): 10 mL  Total OUT: 10 mL    Total NET: 1544 mL      11 Aug 2022 07:01  -  11 Aug 2022 12:04  --------------------------------------------------------  IN:    Norepinephrine: 56.8 mL    Norepinephrine: 180 mL    Propofol: 15 mL  Total IN: 251.8 mL    OUT:    Indwelling Catheter - Urethral (mL): 110 mL  Total OUT: 110 mL    Total NET: 141.8 mL        Daily     Daily      HPI: Kings Christy is an 83 y/o M with past medical history significant for NIDDM, Parkinson's (Sinemet) c/b functional quadriplegia and dementia (A&O x 1-2), HTN, HLD, Depression who had presented to [facility] on 8/5/22 after his home health aide reported patient was pale with hypotension along with increased weakness. He was admitted with severe sepsis secondary to CAP vs aspiration treated with course of antibiotics. He had persistent fever, found on CT scan to have thickening of ascending colon and hepatic flexure with a distended gallbladder, concern for Colitis and cholecystitis GI was subsequently consulted, agreeing with management of likely cholecystitis as primary insult with colitis as secondary insult. He continued on antibiotic regimen, with improvement back to functional baseline. However, overnight on 8/10 into 8/11/22 he developed acute AMS with oxygen desaturation to 80s on 2L NC. He was upgraded from RMF to 7LA, however patient further decompensated with hypotension and progressing hypoxia. He was emergently intubated for airway protection, given empiric dose of Meropenem due to concern for worsening cholecystitis. He was started on high dose pressors peripherally and was transferred per surgical attending from MICU to SICU.     PAST MEDICAL & SURGICAL HISTORY:  DM2 (diabetes mellitus, type 2)  Parkinsons  Hypertension  Hyperlipidemia  Melanoma  Depression  Tinnitus of left ear  Vertigo  No significant past surgical history    Social History:  patient lives alone with 24 hour hha  former smoker (05 Aug 2022 03:20)    Allergies  No Known Allergies      Home Medications:  aspirin 81 mg oral tablet: 1 tab(s) orally once a day (05 Aug 2022 13:47)  carbidopa-levodopa 25 mg-250 mg oral tablet: 0.5 tab(s) orally 4 times a day (05 Aug 2022 13:47)  Centrum oral tablet: 1 tab(s) orally once a day (05 Aug 2022 13:47)  Crestor 20 mg oral tablet: 1 tab(s) orally once a day (at bedtime) (05 Aug 2022 13:47)  doxepin 10 mg oral capsule: 1 cap(s) orally once a day  Can give up to 2 caps per day (05 Aug 2022 13:47)  Fish Oil oral capsule: 1 cap(s) orally once a day (05 Aug 2022 13:47)  LORAZEPAM  1 MG TABS: 1 tab(s) orally 4 times a day (05 Aug 2022 13:47)  metFORMIN 750 mg oral tablet, extended release: 2 tab(s) orally once a day (05 Aug 2022 13:47)  NIFEdipine 30 mg oral tablet, extended release: 1 tab(s) orally once a day (05 Aug 2022 13:47)  OLANZapine 7.5 mg oral tablet: 1 tab(s) orally once a day (at bedtime) (05 Aug 2022 13:47)  quinapril 40 mg oral tablet: 1 tab(s) orally once a day (05 Aug 2022 13:47)  Santyl 250 units/g topical ointment: Apply topically to affected area once a day (05 Aug 2022 13:47)  Vitamin B12 1000 mcg oral tablet: 1 tab(s) orally once a day (05 Aug 2022 13:47)  Vitamin D3 25 mcg (1000 intl units) oral tablet: 1 tab(s) orally once a day (05 Aug 2022 13:47)    MEDICATIONS  (STANDING):  glycopyrrolate Injectable 0.4 milliGRAM(s) IV Push every 4 hours  HYDROmorphone Infusion. 0.5 mG/Hr (2.5 mL/Hr) IV Continuous <Continuous>  norepinephrine Infusion 0.05 MICROgram(s)/kG/Min (2.84 mL/Hr) IV Continuous <Continuous>  propofol Infusion 10 MICROgram(s)/kG/Min (3.63 mL/Hr) IV Continuous <Continuous>    MEDICATIONS  (PRN):  haloperidol    Injectable 1 milliGRAM(s) IntraMuscular every 1 hour PRN terminal agitation  HYDROmorphone  Injectable 1 milliGRAM(s) IV Push every 1 hour PRN dyspnea, RR>25  LORazepam   Injectable 1 milliGRAM(s) IV Push every 1 hour PRN anxiety/agitation      Physical Exam:   General: Well appearing, resting comfortably in bed in no acute distress  Neuro: Grossly intact bilaterally   HEENT: Normocephalic, atraumatic, no JVD, trachea midline   Chest:   Heart: Regular S1/S2, no murmus rubs or gallops    Lungs: Unlabored breathing on ***; Clear to auscultation bilaterally, no adventitious sounds   Abdomen: Soft, non-tender, normoactive bowel sounds, no tenderness to palpation in all 4 quadrants   Upper Extremities: No edema, freely mobile bilaterally   Lower Extremities: No edema, SCDs in place, 2+ DP pulse bilaterally   Skin: Warm, non-diaphoretic throughout       Labs:                         11.8   17.95 )-----------( 391      ( 11 Aug 2022 05:50 )             35.6     08-11    142  |  106  |  26<H>  ----------------------------<  286<H>  3.3<L>   |  24  |  1.00    Ca    8.0<L>      11 Aug 2022 05:50  Phos  3.8     08-11  Mg     1.6     08-11    TPro  5.3<L>  /  Alb  2.4<L>  /  TBili  2.4<H>  /  DBili  x   /  AST  792<H>  /  ALT  107<H>  /  AlkPhos  814<H>  08-11    CAPILLARY BLOOD GLUCOSE  POCT Blood Glucose.: 312 mg/dL (11 Aug 2022 11:40)  POCT Blood Glucose.: 268 mg/dL (11 Aug 2022 07:30)  POCT Blood Glucose.: 218 mg/dL (10 Aug 2022 22:14)  POCT Blood Glucose.: 187 mg/dL (10 Aug 2022 17:50)  POCT Blood Glucose.: 179 mg/dL (10 Aug 2022 12:28)    CARDIAC MARKERS ( 11 Aug 2022 01:46 )  x     / 0.03 ng/mL / 43 U/L / x     / 1.3 ng/mL    PT/INR - ( 11 Aug 2022 05:50 )   PT: 19.4 sec;   INR: 1.62     PTT - ( 11 Aug 2022 05:50 )  PTT:30.2 sec    COVID-19 PCR: NotDetec (04 Aug 2022 14:58)      Vital Signs:   Vital Signs Last 24 Hrs  T(C): 37.2 (11 Aug 2022 09:09), Max: 40 (11 Aug 2022 00:20)  T(F): 98.9 (11 Aug 2022 09:09), Max: 104 (11 Aug 2022 00:20)  HR: 62 (11 Aug 2022 12:00) (62 - 154)  BP: 139/73 (11 Aug 2022 12:00) (60/35 - 195/125)  BP(mean): 100 (11 Aug 2022 12:00) (43 - 139)  RR: 18 (11 Aug 2022 12:00) (12 - 28)  SpO2: 99% (11 Aug 2022 12:00) (90% - 99%)    Parameters below as of 11 Aug 2022 12:00  Patient On (Oxygen Delivery Method): ventilator    O2 Concentration (%): 40  Mode: AC/ CMV (Assist Control/ Continuous Mandatory Ventilation), RR (machine): 12, TV (machine): 500, FiO2: 40, PEEP: 5, ITime: 1, MAP: 8, PIP: 16    Input/Output:   I&O's Detail    10 Aug 2022 07:01  -  11 Aug 2022 07:00  --------------------------------------------------------  IN:    Lactated Ringers Bolus: 1000 mL    Norepinephrine: 499 mL    Propofol: 55 mL  Total IN: 1554 mL    OUT:    Indwelling Catheter - Urethral (mL): 10 mL  Total OUT: 10 mL    Total NET: 1544 mL      11 Aug 2022 07:01  -  11 Aug 2022 12:04  --------------------------------------------------------  IN:    Norepinephrine: 56.8 mL    Norepinephrine: 180 mL    Propofol: 15 mL  Total IN: 251.8 mL    OUT:    Indwelling Catheter - Urethral (mL): 110 mL  Total OUT: 110 mL    Total NET: 141.8 mL        Daily     Daily      HPI: Kings Christy is an 83 y/o M with past medical history significant for NIDDM, Parkinson's (Sinemet) c/b functional quadriplegia and dementia (A&O x 1-2), HTN, HLD, Depression who had presented to [facility] on 8/5/22 after his home health aide reported patient was pale with hypotension along with increased weakness. He was admitted with severe sepsis secondary to CAP vs aspiration treated with course of antibiotics. He had persistent fever, found on CT scan to have thickening of ascending colon and hepatic flexure with a distended gallbladder, concern for Colitis and cholecystitis GI was subsequently consulted, agreeing with management of likely cholecystitis as primary insult with colitis as secondary insult. He continued on antibiotic regimen, with improvement back to functional baseline. However, overnight on 8/10 into 8/11/22 he developed acute AMS with oxygen desaturation to 80s on 2L NC. He was upgraded from RMF to 7LA, however patient further decompensated with hypotension and progressing hypoxia. He was emergently intubated for airway protection, given empiric dose of Meropenem due to concern for worsening cholecystitis. He was started on high dose pressors peripherally and was transferred per surgical attending from MICU to SICU.     PAST MEDICAL & SURGICAL HISTORY:  DM2 (diabetes mellitus, type 2)  Parkinsons  Hypertension  Hyperlipidemia  Melanoma  Depression  Tinnitus of left ear  Vertigo  No significant past surgical history    Social History:  patient lives alone with 24 hour hha  former smoker (05 Aug 2022 03:20)    Allergies  No Known Allergies      Home Medications:  aspirin 81 mg oral tablet: 1 tab(s) orally once a day (05 Aug 2022 13:47)  carbidopa-levodopa 25 mg-250 mg oral tablet: 0.5 tab(s) orally 4 times a day (05 Aug 2022 13:47)  Centrum oral tablet: 1 tab(s) orally once a day (05 Aug 2022 13:47)  Crestor 20 mg oral tablet: 1 tab(s) orally once a day (at bedtime) (05 Aug 2022 13:47)  doxepin 10 mg oral capsule: 1 cap(s) orally once a day  Can give up to 2 caps per day (05 Aug 2022 13:47)  Fish Oil oral capsule: 1 cap(s) orally once a day (05 Aug 2022 13:47)  LORAZEPAM  1 MG TABS: 1 tab(s) orally 4 times a day (05 Aug 2022 13:47)  metFORMIN 750 mg oral tablet, extended release: 2 tab(s) orally once a day (05 Aug 2022 13:47)  NIFEdipine 30 mg oral tablet, extended release: 1 tab(s) orally once a day (05 Aug 2022 13:47)  OLANZapine 7.5 mg oral tablet: 1 tab(s) orally once a day (at bedtime) (05 Aug 2022 13:47)  quinapril 40 mg oral tablet: 1 tab(s) orally once a day (05 Aug 2022 13:47)  Santyl 250 units/g topical ointment: Apply topically to affected area once a day (05 Aug 2022 13:47)  Vitamin B12 1000 mcg oral tablet: 1 tab(s) orally once a day (05 Aug 2022 13:47)  Vitamin D3 25 mcg (1000 intl units) oral tablet: 1 tab(s) orally once a day (05 Aug 2022 13:47)    MEDICATIONS  (STANDING):  glycopyrrolate Injectable 0.4 milliGRAM(s) IV Push every 4 hours  HYDROmorphone Infusion. 0.5 mG/Hr (2.5 mL/Hr) IV Continuous <Continuous>  norepinephrine Infusion 0.05 MICROgram(s)/kG/Min (2.84 mL/Hr) IV Continuous <Continuous>  propofol Infusion 10 MICROgram(s)/kG/Min (3.63 mL/Hr) IV Continuous <Continuous>    MEDICATIONS  (PRN):  haloperidol    Injectable 1 milliGRAM(s) IntraMuscular every 1 hour PRN terminal agitation  HYDROmorphone  Injectable 1 milliGRAM(s) IV Push every 1 hour PRN dyspnea, RR>25  LORazepam   Injectable 1 milliGRAM(s) IV Push every 1 hour PRN anxiety/agitation      Physical Exam:   General: Pale, diaphoretic elderly male, laying critically ill in bed    Neuro: Grossly intact bilaterally   HEENT: Normocephalic, atraumatic, no JVD, trachea midline   Chest: Equal rise and fall of chest   Heart: Regular S1/S2, no murmurs rubs or gallops    Lungs: Unlabored breathing on ventilator; coarse ronchi bilaterally   Abdomen: Soft, non-tender, normoactive bowel sounds, no tenderness to palpation in all 4 quadrants   Upper Extremities: No edema, hands cool and clammy   Lower Extremities: No edema, SCDs in place, 2+ DP pulse bilaterally; feet cool to lower pre-tibial region   Skin: cool, clammy, diaphoretic throughout        Labs:                         11.8   17.95 )-----------( 391      ( 11 Aug 2022 05:50 )             35.6     08-11    142  |  106  |  26<H>  ----------------------------<  286<H>  3.3<L>   |  24  |  1.00    Ca    8.0<L>      11 Aug 2022 05:50  Phos  3.8     08-11  Mg     1.6     08-11    TPro  5.3<L>  /  Alb  2.4<L>  /  TBili  2.4<H>  /  DBili  x   /  AST  792<H>  /  ALT  107<H>  /  AlkPhos  814<H>  08-11    CAPILLARY BLOOD GLUCOSE  POCT Blood Glucose.: 312 mg/dL (11 Aug 2022 11:40)  POCT Blood Glucose.: 268 mg/dL (11 Aug 2022 07:30)  POCT Blood Glucose.: 218 mg/dL (10 Aug 2022 22:14)  POCT Blood Glucose.: 187 mg/dL (10 Aug 2022 17:50)  POCT Blood Glucose.: 179 mg/dL (10 Aug 2022 12:28)    CARDIAC MARKERS ( 11 Aug 2022 01:46 )  x     / 0.03 ng/mL / 43 U/L / x     / 1.3 ng/mL    PT/INR - ( 11 Aug 2022 05:50 )   PT: 19.4 sec;   INR: 1.62     PTT - ( 11 Aug 2022 05:50 )  PTT:30.2 sec    COVID-19 PCR: NotDetec (04 Aug 2022 14:58)      Vital Signs:   Vital Signs Last 24 Hrs  T(C): 37.2 (11 Aug 2022 09:09), Max: 40 (11 Aug 2022 00:20)  T(F): 98.9 (11 Aug 2022 09:09), Max: 104 (11 Aug 2022 00:20)  HR: 62 (11 Aug 2022 12:00) (62 - 154)  BP: 139/73 (11 Aug 2022 12:00) (60/35 - 195/125)  BP(mean): 100 (11 Aug 2022 12:00) (43 - 139)  RR: 18 (11 Aug 2022 12:00) (12 - 28)  SpO2: 99% (11 Aug 2022 12:00) (90% - 99%)    Parameters below as of 11 Aug 2022 12:00  Patient On (Oxygen Delivery Method): ventilator    O2 Concentration (%): 40  Mode: AC/ CMV (Assist Control/ Continuous Mandatory Ventilation), RR (machine): 12, TV (machine): 500, FiO2: 40, PEEP: 5, ITime: 1, MAP: 8, PIP: 16    Input/Output:   I&O's Detail    10 Aug 2022 07:01  -  11 Aug 2022 07:00  --------------------------------------------------------  IN:    Lactated Ringers Bolus: 1000 mL    Norepinephrine: 499 mL    Propofol: 55 mL  Total IN: 1554 mL    OUT:    Indwelling Catheter - Urethral (mL): 10 mL  Total OUT: 10 mL    Total NET: 1544 mL      11 Aug 2022 07:01  -  11 Aug 2022 12:04  --------------------------------------------------------  IN:    Norepinephrine: 56.8 mL    Norepinephrine: 180 mL    Propofol: 15 mL  Total IN: 251.8 mL    OUT:    Indwelling Catheter - Urethral (mL): 110 mL  Total OUT: 110 mL    Total NET: 141.8 mL        Daily     Daily

## 2022-08-11 NOTE — CONSULT NOTE ADULT - CRITICAL CARE ATTENDING COMMENT
POCUS done, arterial line placed, discussion with the health aid and multiple visits at bedside to manage this patient with high acuity.

## 2022-08-11 NOTE — DIETITIAN INITIAL EVALUATION ADULT - WEIGHT FOR BMI (KG)
Nursing Transfer Note      9/17/2018     Transfer From: [facility]     Transfer via wheelchair    Transfer with cardiac monitoring    Transported by MADHAVI Mathis    Medicines sent: NS infusing per IV pump    Chart send with patient: Yes    Notified: girlfriend    Patient reassessed at: 9/17/18 at 1757    Upon arrival to floor: cardiac monitor applied, patient oriented to room, call bell in reach and bed in lowest position    Patient admitted to CTSU. Patient awake, alert and oriented. Cardiac monitoring maintained throughout transfer. Vital signs stable. No acute distress. Patient assessed, denies any pain or discomfort, oriented to room, and instructed to call for assistance or any needs. Call bell within reach. Reviewed goals for today. Will continue to monitor.     [facility] and Claudia Cortez MD with Stroke notified of transfer.     60.3

## 2022-08-11 NOTE — PROGRESS NOTE ADULT - ASSESSMENT
81 yo M with PMHx Parkinson's, HTN, T2DM (A1C 6.9), HLD, chronic benzo use (Ativan TID at home per A, unclear dose) BIBEMS to [facility] ED by HHA due to concern for weakness, initially admitted to [facility] for management of sepsis 2/2 CAP (s/p abx) found to have acute cholecystitis on RUQ U/S initially deemed non-surgical candidate with no urgent intervention. ICU consulted on 8/10-11 overnight due to concern for worsening sepsis likely 2/2 acute cholecystitis stepped up to MICU.     NEURO  #Toxic metabolic encephalopathy   AOx2-3 at baseline. During time of ICU consult, pt responsive to verbal stimuli and following some commands but not answering questions. Likely AMS i/s/o worsening sepsis.   - Monitor mental status  - Currently intubated/sedated (Propofol, Cristino -2 to -3)    #Chronic benzodiazepine use   Hx of chronic benzodiazepine use. Per HHA, takes Ativan TID at home, unclear dose.   - Ativan 1mg Q12 ordered while admitted (currently held)     #Parkinson's disease  Hx of Parkinson's disease. Home medications include Sinemet.  - c/w Sinemet     PULM  #Acute hypoxic respiratory failure with increased WOB  Intubated on 8/11 i/s/o worsening hypoxia and increased WOB with accessory muscle use.  - Vent settings: VCAC, RR12, , PEEP 5, 100% FiO2  - F/u ABG  - Daily SBT/SAT as able     CARDIOVASCULAR  #Shock 2/2 sepsis  Pt with acute onset altered mental status, febrile and hypotensive, with an increase in Alk phos to 808, AST 1100, , however BUN/Cr is 21/0.72. Pt is s/p 1L NS, 1L LR  - MAP goal >65, titrate Levo gtt  - Can add vasopressin gtt if neccesary  - Manage underlying sepsis and cholecystitis as below      #HTN  Hx of HTN.  - Holding anti-HTN i/s/o sepsis     #HLD  - c/w Atorvastatin 40mg Qhs     ENDO  #T2DM  - mISS  - Q6 FS    GI  #Acute cholecystitis  - Surgery following (called for urgent evaluation overnight)     #Hepatic flexure colitis   - GI following     RENAL  BUN/Cr is 21/0.72  - Strict I/O    HEME  #Leukocytosis   STAT labs ordered showing WBC 16k, likely i/s/o worsening sepsis and active infection.   - Management of sepsis as below  - Monitor daily CBC    ID  #Sepsis  - F/u blood cx  (sent 8/11)   - C/w ________________  - W/u cholecystitis, f/u surg recs for possible intervention for source control    MISC  F: s/p 1L NS x1 and 1L LR x1  E: Replete PRN   N: Diet, NPO   DVT ppx: Holding i/s/o possible surgical intervention   GI ppx: Protonix IV Q24  Bowel: Senna/Miralax    Dispo: MICU    LINES  Peripheral IVs    FULL CODE   83 yo M with PMHx Parkinson's, HTN, T2DM (A1C 6.9), HLD, chronic benzo use (Ativan TID at home per A, unclear dose) BIBEMS to [facility] ED by HHA due to concern for weakness, initially admitted to [facility] for management of sepsis 2/2 CAP (s/p abx) found to have acute cholecystitis on RUQ U/S initially deemed non-surgical candidate with no urgent intervention. ICU consulted on 8/10-11 overnight due to concern for worsening sepsis likely 2/2 acute cholecystitis stepped up to MICU.     NEURO  #Toxic metabolic encephalopathy   AOx2-3 at baseline. During time of ICU consult, pt responsive to verbal stimuli and following some commands but not answering questions. Likely AMS i/s/o worsening sepsis..   - Monitor mental status  - Currently intubated/sedated (Propofol, Cristino -2 to -3)  - Manage systemic infection as below    #Chronic benzodiazepine use   Hx of chronic benzodiazepine use. Per HHA, takes Ativan TID at home, unclear dose.   - Ativan 1mg Q12 ordered while admitted (currently held)     #Parkinson's disease  Hx of Parkinson's disease. Home medications include Sinemet.  - c/w Sinemet     PULM  #Acute hypoxic respiratory failure with increased WOB  Intubated on 8/11 i/s/o worsening hypoxia and increased WOB with accessory muscle use.  - Vent settings: VCAC, RR12, , PEEP 5, 100% FiO2  - F/u ABG  - Daily SBT/SAT as able     CARDIOVASCULAR  #Shock 2/2 sepsis  Pt with acute onset altered mental status, febrile and hypotensive, with an increase in Alk phos to 808, AST 1100, , however BUN/Cr is 21/0.72. Pt is s/p 1L NS, 1L LR  - MAP goal >65, titrate Levo gtt  - Can add vasopressin gtt if neccesary  - Manage underlying sepsis and cholecystitis as below  - IVF as needed, LR preferred    #HTN  Hx of HTN.  - Holding anti-HTN regimen i/s/o sepsis     #HLD  - c/w Atorvastatin 40mg Qhs     ENDO  #T2DM  - mISS  - Q6 FS    GI  #Acute cholecystitis  Elevated bilirubin in cholestatic patterns, direct bilirubinemia with normal indirect bilirubin. 8/7 US abdomen demonstrates distended GB with sludge trace pericholecystic fluid, and wall thickening. No GB stones were noted.   - Surgery following (called for urgent evaluation overnight)   - Heparin gtt held for possibility of surgical intervention    #Hepatic flexure colitis   - GI following     RENAL  BUN/Cr is 21/0.72  - Strict I/O, Krishnamurthy placed    HEME  #Leukocytosis   STAT labs ordered showing WBC 16k, likely i/s/o worsening sepsis and active infection.   - Management of sepsis as below  - Monitor daily CBC    ID  #Sepsis  Pt with acute onset AMS, fever, and persistent hypotension. MRSA PCR and Staph Aureus PCR negative. Febrile to 104F, with laboratory evidence of perfusion defects. Lactate elevated at 2.5. 8/6 CT abdomen and pelvis with marked wall thickening of ascending colon and hepatic flexure indicative of possible colitis. GB is distended with wall thickeing and d adjacent colitis. POCUS demonstrates enlarged GB with sludge.   - F/u blood cx  (sent 8/11)   - C/w ________________  - W/u cholecystitis, f/u surg recs for possible intervention for source control    MISC  F: s/p 1L NS x1 and 1L LR x1  E: Replete PRN   N: Diet, NPO   DVT ppx: Holding i/s/o possible surgical intervention   GI ppx: Protonix IV Q24  Bowel: Senna/Miralax    Dispo: MICU    LINES  Peripheral IVs  R axillary a line    FULL CODE   83 yo M with PMHx Parkinson's, HTN, T2DM (A1C 6.9), HLD, chronic benzo use (Ativan TID at home per A, unclear dose) BIBEMS to [facility] ED by HHA due to concern for weakness, initially admitted to [facility] for management of sepsis 2/2 CAP (s/p abx) found to have acute cholecystitis on RUQ U/S initially deemed non-surgical candidate with no urgent intervention. ICU consulted on 8/10-11 overnight due to concern for worsening sepsis likely 2/2 acute cholecystitis stepped up to MICU.     NEURO  #Toxic metabolic encephalopathy   AOx2-3 at baseline. During time of ICU consult, pt responsive to verbal stimuli and following some commands but not answering questions. Likely AMS i/s/o worsening sepsis..   - Monitor mental status  - Currently intubated/sedated (Propofol, Cristino -2 to -3)  - Manage systemic infection as below    #Chronic benzodiazepine use   Hx of chronic benzodiazepine use. Per HHA, takes Ativan TID at home, unclear dose.   - Ativan 1mg Q12 ordered while admitted (currently held)     #Parkinson's disease  Hx of Parkinson's disease. Home medications include Sinemet.  - c/w Sinemet     PULM  #Acute hypoxic respiratory failure with increased WOB  Intubated on 8/11 i/s/o worsening hypoxia and increased WOB with accessory muscle use.  - Vent settings: VCAC, RR12, , PEEP 5, 100% FiO2  - F/u ABG  - Daily SBT/SAT as able     CARDIOVASCULAR  #Shock 2/2 sepsis  Pt with acute onset altered mental status, febrile and hypotensive, with an increase in Alk phos to 808, AST 1100, , however BUN/Cr is 21/0.72. Pt is s/p 1L NS, 1L LR  - MAP goal >65, titrate Levo gtt  - Can add vasopressin gtt if neccesary  - Manage underlying sepsis and cholecystitis as below  - IVF as needed, LR preferred    #HTN  Hx of HTN.  - Holding anti-HTN regimen i/s/o sepsis     #HLD  - c/w Atorvastatin 40mg Qhs     ENDO  #T2DM  - mISS  - Q6 FS    GI  #Acute cholecystitis  Elevated bilirubin in cholestatic patterns, direct bilirubinemia with normal indirect bilirubin. 8/7 US abdomen demonstrates distended GB with sludge trace pericholecystic fluid, and wall thickening. No GB stones were noted.   - Surgery following (called for urgent evaluation overnight)   - Heparin gtt held for possibility of surgical intervention    #Hepatic flexure colitis   - GI following     RENAL  BUN/Cr is 21/0.72  - Strict I/O, Krishnamurthy placed    HEME  #Leukocytosis   STAT labs ordered showing WBC 16k, likely i/s/o worsening sepsis and active infection.   - Management of sepsis as below  - Monitor daily CBC    ID  #Sepsis  Pt with acute onset AMS, fever, and persistent hypotension. MRSA PCR and Staph Aureus PCR negative. Febrile to 104F, with laboratory evidence of perfusion defects. Lactate elevated at 2.5. 8/6 CT abdomen and pelvis with marked wall thickening of ascending colon and hepatic flexure indicative of possible colitis. GB is distended with wall thickeing and d adjacent colitis. POCUS demonstrates enlarged GB with sludge.   - F/u blood cx  (sent 8/11)   - C/w ________________  - Trend lactate Q4  - W/u cholecystitis, f/u surg recs for possible intervention for source control    MISC  F: s/p 1L NS x1 and 1L LR x1  E: Replete PRN   N: Diet, NPO   DVT ppx: Holding i/s/o possible surgical intervention   GI ppx: Protonix IV Q24  Bowel: Senna/Miralax    Dispo: MICU    LINES  Peripheral IVs  R axillary a line (8/11-  Krishnamurthy (8/11-    FULL CODE   83 yo M with PMHx Parkinson's, HTN, T2DM (A1C 6.9), HLD, chronic benzo use (Ativan TID at home per A, unclear dose) BIBEMS to [facility] ED by HHA due to concern for weakness, initially admitted to [facility] for management of sepsis 2/2 CAP (s/p abx) found to have acute cholecystitis on RUQ U/S initially deemed non-surgical candidate with no urgent intervention. ICU consulted on 8/10-11 overnight due to concern for worsening sepsis likely 2/2 acute cholecystitis stepped up to MICU.     NEURO  #Toxic metabolic encephalopathy   AOx2-3 at baseline. During time of ICU consult, pt responsive to verbal stimuli and following some commands but not answering questions. Likely AMS i/s/o worsening sepsis..   - Monitor mental status  - Currently intubated/sedated (Propofol, Cristino goal -2 to -3)  - Manage systemic infection as below    #Chronic benzodiazepine use   Hx of chronic benzodiazepine use. Per HHA, takes Ativan TID at home, unclear dose.   - Ativan 1mg Q12 ordered while admitted (currently held)     #Parkinson's disease  Hx of Parkinson's disease. Home medications include Sinemet.  - c/w Sinemet     PULM  #Acute hypoxic respiratory failure with increased WOB  Intubated on 8/11 i/s/o worsening hypoxia and increased WOB with accessory muscle use.  - Vent settings: VCAC, RR12, , PEEP 5, 100% FiO2  - F/u ABG  - Daily SBT/SAT as able     CARDIOVASCULAR  #Shock 2/2 sepsis  Pt with acute onset altered mental status, febrile and hypotensive, with an increase in Alk phos to 808, AST 1100, , however BUN/Cr is 21/0.72. Pt is s/p 1L NS, 1L LR  - MAP goal >65, titrate Levo gtt  - Can add vasopressin gtt if neccesary  - Manage underlying sepsis and cholecystitis as below  - IVF as needed, LR preferred    #HTN  Hx of HTN.  - Holding anti-HTN regimen i/s/o sepsis     #HLD  - c/w Atorvastatin 40mg Qhs     ENDO  #T2DM  A1C this admission 6.9  - mISS  - Q6 FS    GI  #Acute cholecystitis  Elevated bilirubin in cholestatic patterns, direct bilirubinemia with normal indirect bilirubin. 8/7 US abdomen demonstrates distended GB with sludge trace pericholecystic fluid, and wall thickening. No GB stones were noted.   - Surgery following (called for urgent evaluation overnight)   - Heparin gtt held for possibility of surgical intervention    #Hepatic flexure colitis   - GI following, f/u recs  - Surg consulted, f/u recs    RENAL  BUN/Cr is 21/0.72  - Strict I/O, Krishnamurthy placed    HEME  #Leukocytosis   STAT labs ordered showing WBC 16k, likely i/s/o worsening sepsis and active infection.   - Management of sepsis as below  - Monitor daily CBC    ID  #Sepsis  Pt with acute onset AMS, fever, and persistent hypotension. MRSA PCR and Staph Aureus PCR negative. Febrile to 104F, with laboratory evidence of perfusion defects. Lactate elevated at 2.5. 8/6 CT abdomen and pelvis with marked wall thickening of ascending colon and hepatic flexure indicative of possible colitis. GB is distended with wall thickeing and d adjacent colitis. POCUS demonstrates enlarged GB with sludge.   - F/u blood cx  (sent 8/11)   - C/w Metronidazole 500 Q8 and _______________ Cefepime 2g Q8?  - Trend lactate Q4  - W/u cholecystitis, f/u surg recs for possible intervention for source control    MISC  F: s/p 1L NS x1 and 1L LR x1  E: Replete PRN   N: Diet, NPO   DVT ppx: Holding i/s/o possible surgical intervention   GI ppx: Protonix IV Q24  Bowel: Senna/Miralax    Dispo: MICU    LINES  Peripheral IVs  R axillary a line (8/11-  Krishnamurthy (8/11-    FULL CODE   83 yo M with PMHx Parkinson's, HTN, T2DM (A1C 6.9), HLD, chronic benzo use (Ativan TID at home per A, unclear dose) BIBEMS to [facility] ED by HHA due to concern for weakness, initially admitted to [facility] for management of sepsis 2/2 CAP (s/p abx) found to have acute cholecystitis on RUQ U/S initially deemed non-surgical candidate with no urgent intervention. ICU consulted on 8/10-11 overnight due to concern for worsening sepsis likely 2/2 acute cholecystitis stepped up to MICU.     NEURO  #Toxic metabolic encephalopathy   AOx2-3 at baseline. During time of ICU consult, pt responsive to verbal stimuli and following some commands but not answering questions. Likely AMS i/s/o worsening sepsis..   - Monitor mental status  - Currently intubated/sedated (Propofol, Cristino goal -2 to -3)  - Manage systemic infection as below    #Chronic benzodiazepine use   Hx of chronic benzodiazepine use. Per HHA, takes Ativan TID at home, unclear dose.   - Ativan 1mg Q12 ordered while admitted (currently held)     #Parkinson's disease  Hx of Parkinson's disease. Home medications include Sinemet.  - c/w Sinemet     PULM  #Acute hypoxic respiratory failure with increased WOB  Intubated on 8/11 i/s/o worsening hypoxia and increased WOB with accessory muscle use.  - Vent settings: VCAC, RR12, , PEEP 5, 100% FiO2  - F/u ABG  - Daily SBT/SAT as able     CARDIOVASCULAR  #Shock 2/2 sepsis  Pt with acute onset altered mental status, febrile and hypotensive, with an increase in Alk phos to 808, AST 1100, , however BUN/Cr is 21/0.72. Pt is s/p 1L NS, 1L LR  - MAP goal >65, titrate Levo gtt  - Can add vasopressin gtt if neccesary  - Manage underlying sepsis and cholecystitis as below  - IVF as needed, LR preferred    #HTN  Hx of HTN.  - Holding anti-HTN regimen i/s/o sepsis     #HLD  - c/w Atorvastatin 40mg Qhs     ENDO  #T2DM  A1C this admission 6.9  - mISS  - Q6 FS    GI  #Acute cholecystitis  Elevated bilirubin in cholestatic patterns, direct bilirubinemia with normal indirect bilirubin. 8/7 US abdomen demonstrates distended GB with sludge trace pericholecystic fluid, and wall thickening. No GB stones were noted.   - Surgery following (called for urgent evaluation overnight)   - Heparin gtt held for possibility of surgical intervention    #Hepatic flexure colitis   - GI following, f/u recs  - Surg consulted, f/u recs    RENAL  BUN/Cr is 21/0.72  - Strict I/O, Krishnamurthy placed    HEME  #Leukocytosis   STAT labs ordered showing WBC 16k, likely i/s/o worsening sepsis and active infection.   - Management of sepsis as below  - Monitor daily CBC    ID  #Sepsis  Pt with acute onset AMS, fever, and persistent hypotension. MRSA PCR and Staph Aureus PCR negative. Febrile to 104F, with laboratory evidence of perfusion defects. Lactate elevated at 2.5. 8/6 CT abdomen and pelvis with marked wall thickening of ascending colon and hepatic flexure indicative of possible colitis. GB is distended with wall thickeing and d adjacent colitis. POCUS demonstrates enlarged GB with sludge.   - F/u blood cx  (sent 8/11)   - C/w Meropenem  - Trend lactate Q4  - W/u cholecystitis, f/u surg recs for possible intervention for source control    MISC  F: s/p 1L NS x1 and 1L LR x1  E: Replete PRN   N: Diet, NPO   DVT ppx: Holding i/s/o possible surgical intervention   GI ppx: Protonix IV Q24  Bowel: Senna/Miralax    Dispo: MICU    LINES  Peripheral IVs  R axillary a line (8/11-  Krishnamurthy (8/11-    FULL CODE   83 yo M with PMHx Parkinson's, HTN, T2DM (A1C 6.9), HLD, chronic benzo use (Ativan TID at home per HHA, unclear dose) BIBEMS to [facility] ED by HHA due to concern for weakness, initially admitted to [facility] for management of sepsis 2/2 CAP (s/p abx) found to have acute cholecystitis on RUQ U/S initially deemed non-surgical candidate with no urgent intervention. ICU consulted on 8/10-11 overnight due to concern for worsening sepsis likely 2/2 acute cholecystitis stepped up to MICU.     NEURO  #Toxic metabolic encephalopathy   AOx2-3 at baseline. Likely AMS i/s/o worsening sepsis..   - Monitor mental status  - Currently intubated/sedated (Propofol, Cristino goal -2 to -3)  - Manage systemic infection as below    #Chronic benzodiazepine use   Hx of chronic benzodiazepine use. Per HHA, takes Ativan TID at home, unclear dose.   - Ativan 1mg Q12 ordered while admitted (currently held)     #Parkinson's disease  Hx of Parkinson's disease. Home medications include Sinemet.  - c/w Sinemet     PULM  #Acute hypoxic respiratory failure with increased WOB  Intubated on 8/11 i/s/o worsening hypoxia and increased WOB with accessory muscle use. Likely i/s/o worsening sepsis.   - Vent settings: VCAC, RR12, , PEEP 5, 100% FiO2  - F/u ABG  - Daily SBT/SAT as able     CARDIOVASCULAR  #Shock 2/2 sepsis   Pt with acute onset altered mental status, febrile and hypotensive, with an increase in Alk phos to 808, AST 1100, , however BUN/Cr is 21/0.72. Pt is s/p 1L NS, 1L LR  - MAP goal >65, titrate Levo gtt  - Can add vasopressin gtt if neccesary  - Manage underlying sepsis and cholecystitis as below    #HTN  Hx of HTN.  - Holding anti-HTN regimen i/s/o sepsis     #HLD  - c/w Atorvastatin 40mg Qhs     ENDO  #T2DM  A1C this admission 6.9  - mISS  - Q6 FS    GI  #Acute cholecystitis  Elevated bilirubin in cholestatic patterns, direct bilirubinemia with normal indirect bilirubin. 8/7 US abdomen demonstrates distended GB with sludge trace pericholecystic fluid, and wall thickening. No GB stones were noted.   - Surgery following (called for urgent evaluation overnight)   - GI following, f/u recs  - Heparin gtt held for possibility of surgical intervention    #Hepatic flexure colitis   - GI following, f/u recs  - Surg consulted, f/u recs    RENAL  BUN/Cr is 21/0.72  - Strict I/O, Krishnamurthy placed    HEME  #Leukocytosis   STAT labs ordered showing WBC 16k, likely i/s/o worsening sepsis and active infection.   - Management of sepsis as below  - Monitor daily CBC    ID  #Sepsis  Pt with acute onset AMS, fever, and persistent hypotension. MRSA PCR and Staph Aureus PCR negative. Febrile to 104F, with laboratory evidence of perfusion defects. Lactate elevated at 2.5. 8/6 CT abdomen and pelvis with marked wall thickening of ascending colon and hepatic flexure indicative of possible colitis. GB is distended with wall thickening and adjacent colitis. POCUS demonstrates enlarged GB with sludge. Was on CTX and Flagyl for intraabdom infection, now on Meropenem for Cholecystitis management  - F/u blood cx  (sent 8/11)   - C/w Meropenem, get ID approval  - Trend lactate and CMP Q6  - W/u cholecystitis, f/u surg recs for possible intervention for source control    MISC  F: s/p 1L NS x1 and 1L LR x1  E: Replete PRN   N: Diet, NPO   DVT ppx: Holding i/s/o possible surgical intervention   GI ppx: Protonix IV Q24  Bowel: Senna/Miralax    Dispo: MICU    LINES  Peripheral IVs  R axillary a line (8/11-  Krishnamurthy (8/11-    FULL CODE   81 yo M with PMHx Parkinson's, HTN, T2DM (A1C 6.9), HLD, chronic benzo use (Ativan TID at home per HHA, unclear dose) BIBEMS to [facility] ED by HHA due to concern for weakness, initially admitted to [facility] for management of sepsis 2/2 CAP (s/p abx) found to have acute cholecystitis on RUQ U/S initially deemed non-surgical candidate with no urgent intervention. ICU consulted on 8/10-11 overnight due to concern for worsening sepsis likely 2/2 acute cholecystitis stepped up to MICU.     NEURO  #Toxic metabolic encephalopathy   AOx2-3 at baseline. Likely AMS i/s/o worsening sepsis..   - Monitor mental status  - Currently intubated/sedated (Propofol, Cristino goal -2 to -3)  - Manage systemic infection as below    #Chronic benzodiazepine use   Hx of chronic benzodiazepine use. Per HHA, takes Ativan TID at home, unclear dose.   - Ativan 1mg Q12 ordered while admitted (currently held)     #Parkinson's disease  Hx of Parkinson's disease. Home medications include Sinemet.  - c/w Sinemet     PULM  #Acute hypoxic respiratory failure with increased WOB  Intubated on 8/11 i/s/o worsening hypoxia and increased WOB with accessory muscle use. Likely i/s/o worsening sepsis.   - Vent settings: VCAC, RR12, , PEEP 5, 100% FiO2  - F/u ABG  - Daily SBT/SAT as able     CARDIOVASCULAR  #Shock 2/2 sepsis   Pt with acute onset altered mental status, febrile and hypotensive, with an increase in Alk phos to 808, AST 1100, , however BUN/Cr is 21/0.72. Pt is s/p 1L NS, 1L LR  - MAP goal >65, titrate Levo gtt  - Can add vasopressin gtt if neccesary  - Manage underlying sepsis and cholecystitis as below    #HTN  Hx of HTN.  - Holding anti-HTN regimen i/s/o sepsis     #HLD  - c/w Atorvastatin 40mg Qhs     ENDO  #T2DM  A1C this admission 6.9  - mISS  - Q6 FS    GI  #Acute cholecystitis  Elevated bilirubin in cholestatic patterns, direct bilirubinemia with normal indirect bilirubin. 8/7 US abdomen demonstrates distended GB with sludge trace pericholecystic fluid, and wall thickening. No GB stones were noted.   - Surgery following (called for urgent evaluation overnight)   - GI following, f/u recs  - Heparin gtt held for possibility of surgical intervention    #Hepatic flexure colitis   - GI following, f/u recs  - Surg consulted, f/u recs    RENAL  BUN/Cr is 21/0.72  - Strict I/O, Krishnamurthy placed    HEME  #Leukocytosis   STAT labs ordered showing WBC 16k, likely i/s/o worsening sepsis and active infection.   - Management of sepsis as below  - Monitor daily CBC    ID  #Sepsis  Pt with acute onset AMS, fever, and persistent hypotension. MRSA PCR and Staph Aureus PCR negative. Febrile to 104F, with laboratory evidence of perfusion defects. Lactate elevated at 2.5. 8/6 CT abdomen and pelvis with marked wall thickening of ascending colon and hepatic flexure indicative of possible colitis. GB is distended with wall thickening and adjacent colitis. POCUS demonstrates enlarged GB with sludge. Was on CTX and Flagyl for intraabdom infection, now on Meropenem for Cholecystitis management  - F/u blood cx  (sent 8/11)   - C/w Meropenem, get ID approval  - W/u cholecystitis, f/u surg recs for possible intervention for source control    MISC  F: s/p 1L NS x1 and 1L LR x1  E: Replete PRN   N: Diet, NPO   DVT ppx: Holding i/s/o possible surgical intervention   GI ppx: Protonix IV Q24  Bowel: Senna/Miralax    Dispo: MICU    LINES  Peripheral IVs  R axillary a line (8/11-  Krishnamurthy (8/11-    FULL CODE

## 2022-08-11 NOTE — DIETITIAN INITIAL EVALUATION ADULT - PERTINENT LABORATORY DATA
08-11    142  |  106  |  26<H>  ----------------------------<  286<H>  3.3<L>   |  24  |  1.00    Ca    8.0<L>      11 Aug 2022 05:50  Phos  3.8     08-11  Mg     1.6     08-11    TPro  5.3<L>  /  Alb  2.4<L>  /  TBili  2.4<H>  /  DBili  x   /  AST  792<H>  /  ALT  107<H>  /  AlkPhos  814<H>  08-11  POCT Blood Glucose.: 312 mg/dL (08-11-22 @ 11:40)  A1C with Estimated Average Glucose Result: 6.9 % (08-06-22 @ 05:30)

## 2022-08-11 NOTE — PROGRESS NOTE ADULT - SUBJECTIVE AND OBJECTIVE BOX
GASTROENTEROLOGY PROGRESS NOTE  Reconsulted for elevated LTs and abnormal imaging    PERTINENT REVIEW OF SYSTEMS:  As noted above    Allergies    No Known Allergies    Intolerances      MEDICATIONS:  MEDICATIONS  (STANDING):  atorvastatin 40 milliGRAM(s) Oral at bedtime  chlorhexidine 0.12% Liquid 15 milliLiter(s) Oral Mucosa every 12 hours  chlorhexidine 4% Liquid 1 Application(s) Topical <User Schedule>  dextrose 5%. 1000 milliLiter(s) (50 mL/Hr) IV Continuous <Continuous>  dextrose 5%. 1000 milliLiter(s) (100 mL/Hr) IV Continuous <Continuous>  dextrose 50% Injectable 25 Gram(s) IV Push once  dextrose 50% Injectable 12.5 Gram(s) IV Push once  dextrose 50% Injectable 25 Gram(s) IV Push once  glucagon  Injectable 1 milliGRAM(s) IntraMuscular once  insulin lispro (ADMELOG) corrective regimen sliding scale   SubCutaneous Before meals and at bedtime  norepinephrine Infusion 0.05 MICROgram(s)/kG/Min (2.84 mL/Hr) IV Continuous <Continuous>  pantoprazole  Injectable 40 milliGRAM(s) IV Push every 24 hours  propofol Infusion 10 MICROgram(s)/kG/Min (3.63 mL/Hr) IV Continuous <Continuous>    MEDICATIONS  (PRN):  dextrose Oral Gel 15 Gram(s) Oral once PRN Blood Glucose LESS THAN 70 milliGRAM(s)/deciliter  sodium chloride 0.9% lock flush 10 milliLiter(s) IV Push every 1 hour PRN Pre/post blood products, medications, blood draw, and to maintain line patency    Vital Signs Last 24 Hrs  T(C): 37.2 (11 Aug 2022 09:09), Max: 40 (11 Aug 2022 00:20)  T(F): 98.9 (11 Aug 2022 09:09), Max: 104 (11 Aug 2022 00:20)  HR: 77 (11 Aug 2022 10:00) (77 - 154)  BP: 122/61 (11 Aug 2022 10:00) (60/35 - 195/125)  BP(mean): 86 (11 Aug 2022 10:00) (43 - 139)  RR: 14 (11 Aug 2022 10:00) (12 - 28)  SpO2: 98% (11 Aug 2022 10:00) (90% - 99%)    Parameters below as of 11 Aug 2022 10:00  Patient On (Oxygen Delivery Method): ventilator    O2 Concentration (%): 40    08-10 @ 07:01 - 08-11 @ 07:00  --------------------------------------------------------  IN: 1554 mL / OUT: 10 mL / NET: 1544 mL    08-11 @ 07:01 - 08-11 @ 10:54  --------------------------------------------------------  IN: 190 mL / OUT: 35 mL / NET: 155 mL      PHYSICAL EXAM:     General: Well developed; In ICU on pressors  HEENT: MMM, conjunctiva and sclera clear  Gastrointestinal: Soft tender non-distended; No rebound or guarding  Skin: Warm and dry. No obvious rash    LABS:                        11.8   17.95 )-----------( 391      ( 11 Aug 2022 05:50 )             35.6     08-11    142  |  106  |  26<H>  ----------------------------<  286<H>  3.3<L>   |  24  |  1.00    Ca    8.0<L>      11 Aug 2022 05:50  Phos  3.8     08-11  Mg     1.6     08-11    TPro  5.3<L>  /  Alb  2.4<L>  /  TBili  2.4<H>  /  DBili  x   /  AST  792<H>  /  ALT  107<H>  /  AlkPhos  814<H>  08-11    PT/INR - ( 11 Aug 2022 05:50 )   PT: 19.4 sec;   INR: 1.62          PTT - ( 11 Aug 2022 05:50 )  PTT:30.2 sec                  RADIOLOGY & ADDITIONAL STUDIES:  Reviewed GASTROENTEROLOGY PROGRESS NOTE  P in ICU, intubated, sedated. Reconsulted for elevated LTs and abnormal imaging    PERTINENT REVIEW OF SYSTEMS:  As noted above    Allergies    No Known Allergies    Intolerances      MEDICATIONS:  MEDICATIONS  (STANDING):  atorvastatin 40 milliGRAM(s) Oral at bedtime  chlorhexidine 0.12% Liquid 15 milliLiter(s) Oral Mucosa every 12 hours  chlorhexidine 4% Liquid 1 Application(s) Topical <User Schedule>  dextrose 5%. 1000 milliLiter(s) (50 mL/Hr) IV Continuous <Continuous>  dextrose 5%. 1000 milliLiter(s) (100 mL/Hr) IV Continuous <Continuous>  dextrose 50% Injectable 25 Gram(s) IV Push once  dextrose 50% Injectable 12.5 Gram(s) IV Push once  dextrose 50% Injectable 25 Gram(s) IV Push once  glucagon  Injectable 1 milliGRAM(s) IntraMuscular once  insulin lispro (ADMELOG) corrective regimen sliding scale   SubCutaneous Before meals and at bedtime  norepinephrine Infusion 0.05 MICROgram(s)/kG/Min (2.84 mL/Hr) IV Continuous <Continuous>  pantoprazole  Injectable 40 milliGRAM(s) IV Push every 24 hours  propofol Infusion 10 MICROgram(s)/kG/Min (3.63 mL/Hr) IV Continuous <Continuous>    MEDICATIONS  (PRN):  dextrose Oral Gel 15 Gram(s) Oral once PRN Blood Glucose LESS THAN 70 milliGRAM(s)/deciliter  sodium chloride 0.9% lock flush 10 milliLiter(s) IV Push every 1 hour PRN Pre/post blood products, medications, blood draw, and to maintain line patency    Vital Signs Last 24 Hrs  T(C): 37.2 (11 Aug 2022 09:09), Max: 40 (11 Aug 2022 00:20)  T(F): 98.9 (11 Aug 2022 09:09), Max: 104 (11 Aug 2022 00:20)  HR: 77 (11 Aug 2022 10:00) (77 - 154)  BP: 122/61 (11 Aug 2022 10:00) (60/35 - 195/125)  BP(mean): 86 (11 Aug 2022 10:00) (43 - 139)  RR: 14 (11 Aug 2022 10:00) (12 - 28)  SpO2: 98% (11 Aug 2022 10:00) (90% - 99%)    Parameters below as of 11 Aug 2022 10:00  Patient On (Oxygen Delivery Method): ventilator    O2 Concentration (%): 40    08-10 @ 07:01  -  08-11 @ 07:00  --------------------------------------------------------  IN: 1554 mL / OUT: 10 mL / NET: 1544 mL    08-11 @ 07:01  -  08-11 @ 10:54  --------------------------------------------------------  IN: 190 mL / OUT: 35 mL / NET: 155 mL      PHYSICAL EXAM:     General: Well developed; In ICU on vent and pressors  HEENT: MMM, conjunctiva and sclera clear  Gastrointestinal: Soft distended; No rebound or guarding  Skin: Warm and dry. No obvious rash    LABS:                        11.8   17.95 )-----------( 391      ( 11 Aug 2022 05:50 )             35.6     08-11    142  |  106  |  26<H>  ----------------------------<  286<H>  3.3<L>   |  24  |  1.00    Ca    8.0<L>      11 Aug 2022 05:50  Phos  3.8     08-11  Mg     1.6     08-11    TPro  5.3<L>  /  Alb  2.4<L>  /  TBili  2.4<H>  /  DBili  x   /  AST  792<H>  /  ALT  107<H>  /  AlkPhos  814<H>  08-11    PT/INR - ( 11 Aug 2022 05:50 )   PT: 19.4 sec;   INR: 1.62          PTT - ( 11 Aug 2022 05:50 )  PTT:30.2 sec                  RADIOLOGY & ADDITIONAL STUDIES:  Reviewed

## 2022-08-11 NOTE — CONSULT NOTE ADULT - PROBLEM SELECTOR RECOMMENDATION 5
Patient remains a DNR. Please see GOC note for details of conversation. Plan for compassionate extubation today, HCP is not able to come in.  As discussed during the palliative IDT meeting, the patients PSSA screening did not identify any current psychosocial need or spiritual support deficits.  - Restorationist/Spiritual practice: unknown   - Support system: [x ] strong [ ] adequate [ ] inadequate  - All questions answered, emotional support provided  -  primary team   - Please contact Palliative Medicine 24/7 at [phone] for any acute symptoms or further questions  - Will continue to follow with you

## 2022-08-11 NOTE — CONSULT NOTE ADULT - ASSESSMENT
Assessment:     Plan:   - Palliative consult, plan for comfort care and palliative extubation   - Holding all antibiotics, continue current pressors, do not escalate from current dosing  - No IR procedure, no POCT glucose or invasive monitoring  - Dilaudid gtt for comfort   - PRN Glycopyrrolate, Ativan, Dilaudid for CMO      Assessment: 83 y/o M with past medical history significant for NIDDM, Parkinson's (Sinemet) c/b functional quadriplegia and dementia (A&O x 1-2), HTN, HLD, Depression who had presented to [facility] on 8/5/22 after his home health aide reported patient was pale with hypotension along with increased weakness. He was admitted with severe sepsis secondary to CAP vs aspiration treated with course of antibiotics. He had persistent fever, found on CT scan to have thickening of ascending colon and hepatic flexure with a distended gallbladder, concern for Colitis and cholecystitis GI was subsequently consulted, agreeing with management of likely cholecystitis as primary insult with colitis as secondary insult. He continued on antibiotic regimen, with improvement back to functional baseline. However, overnight on 8/10 into 8/11/22 he developed acute AMS with oxygen desaturation to 80s on 2L NC. He was upgraded from RMF to 7LA, however patient further decompensated with hypotension and progressing hypoxia. He was emergently intubated for airway protection, given empiric dose of Meropenem due to concern for worsening cholecystitis. He was started on high dose pressors peripherally and was transferred per surgical attending from MICU to SICU. Patient with severe septic shock secondary to biliary/GI source, with decompensation requiring life sustaining measures which are not within the goals of care of patient's living will.       Plan:   - Palliative consult, plan for comfort care and palliative extubation   - Holding all antibiotics, continue current pressors, do not escalate from current dosing  - No IR procedure, no POCT glucose or invasive monitoring  - Dilaudid gtt for comfort   - PRN Glycopyrrolate, Ativan, Dilaudid for CMO

## 2022-08-11 NOTE — PROCEDURE NOTE - NSCATHTYPE_GEN_A_CORE
CVC

## 2022-08-11 NOTE — CONSULT NOTE ADULT - ASSESSMENT
82 year old man with respiratory failure, functional quadriplegia, Cholangitis, advance care planning and encounter for palliative care.

## 2022-08-11 NOTE — DIETITIAN INITIAL EVALUATION ADULT - ADD RECOMMEND
1. Nutrition to stay in line with GOC at all times  2. Currently NPO. Allow pleasure feeds if appropriate  3. Pain and bowel regimen per team  4. RD to remain available as needed

## 2022-08-11 NOTE — CONSULT NOTE ADULT - ASSESSMENT
**Incomplete Note**   83 yo M with PMHx Parkinson's, HTN, T2DM (A1C 6.9), HLD, chronic benzo use (Ativan TID at home per HHA, unclear dose) BIBEMS to [facility] ED by HHA due to concern for weakness, initially admitted to [facility] for management of sepsis 2/2 CAP (s/p abx) found to have acute cholecystitis on RUQ U/S initially deemed non-surgical candidate with no urgent intervention. ICU consulted on 8/10-11 overnight due to concern for worsening sepsis likely 2/2 acute cholecystitis stepped up to MICU.     NEURO  #Toxic metabolic encephalopathy   AOx2-3 at baseline. During time of ICU consult, pt responsive to verbal stimuli and following some commands but not answering questions. Likely AMS i/s/o worsening sepsis.   - Monitor mental status  - Currently intubated/sedated     #Chronic benzodiazepine use   Hx of chronic benzodiazepine use. Per HHA, takes Ativan TID at home, unclear dose.   - Ativan 1mg Q12 ordered while admitted (currently held)     #Parkinson's disease  Hx of Parkinson's disease. Home medications include Sinemet.  - c/w Sinemet     PULM  #Acute hypoxic respiratory failure with increased WOB  Intubated on 8/11 i/s/o worsening hypoxia and increased WOB with accessory muscle use.  - Vent settings:   - Daily SBT/SAT as able     CARDIOVASCULAR  #Shock 2/2 sepsis  -     #HTN  Hx of HTN.  - Holding anti-HTN i/s/o sepsis     #HLD  - c/w Atorvastatin 40mg Qhs     ENDO  #T2DM  - mISS  - Q6 FS    GI  #Acute cholecystitis  - Surgery following (called for urgent evaluation overnight)     #Hepatic flexure colitis   - GI following     RENAL  - Strict I/O    HEME  #Leukocytosis   STAT labs ordered showing WBC 16k, likely i/s/o worsening sepsis and active infxn.  - Management of sepsis as below  - Monitor daily CBC    ID  #Sepsis  - F/U blood cx  (sent 8/11)     MISC  F: s/p 1L NS x1   E: Replete PRN   N: Diet, NPO   DVT ppx: Holding i/s/o possible surgical intervention   GI ppx: Protonix IV Q24  Bowel: Senna/Miralax    Dispo: MICU    LINES  Peripheral IVs    FULL CODE 81 yo M with PMHx Parkinson's, HTN, T2DM (A1C 6.9), HLD, chronic benzo use (Ativan TID at home per A, unclear dose) BIBEMS to [facility] ED by HHA due to concern for weakness, initially admitted to [facility] for management of sepsis 2/2 CAP (s/p abx) found to have acute cholecystitis on RUQ U/S initially deemed non-surgical candidate with no urgent intervention. ICU consulted on 8/10-11 overnight due to concern for worsening sepsis likely 2/2 acute cholecystitis stepped up to MICU.     NEURO  #Toxic metabolic encephalopathy   AOx2-3 at baseline. During time of ICU consult, pt responsive to verbal stimuli and following some commands but not answering questions. Likely AMS i/s/o worsening sepsis.   - Monitor mental status  - Currently intubated/sedated     #Chronic benzodiazepine use   Hx of chronic benzodiazepine use. Per HHA, takes Ativan TID at home, unclear dose.   - Ativan 1mg Q12 ordered while admitted (currently held)     #Parkinson's disease  Hx of Parkinson's disease. Home medications include Sinemet.  - c/w Sinemet     PULM  #Acute hypoxic respiratory failure with increased WOB  Intubated on 8/11 i/s/o worsening hypoxia and increased WOB with accessory muscle use.  - Daily SBT/SAT as able     CARDIOVASCULAR  #Shock 2/2 sepsis  ICU consulted for fevers, tachycardia, tachypnea, and elevated lactate. BPs borderline. Pt stepped up to MICU requiring pressors. Etiology of shock likely sepsis.  - Management of sepsis as below  - Levophed gtt     #HTN  Hx of HTN.  - Holding anti-HTN i/s/o sepsis     #HLD  - c/w Atorvastatin 40mg Qhs     ENDO  #T2DM  - mISS  - Q6 FS    GI  #Acute cholecystitis  Pt with abd pain this admission, CTAP concerning for acute cholecystitis. RUQ US performed showing pericholecystic fluid, GB wall thickening, and sludge. HIDA scan unequivocal (non-visualization of GB). Surgery/IR consulted initially however since pt HD stable at the time without concern for overt sepsis and overall improving, intervention deferred. Currently being stepped up to ICU for worsening sepsis and repeat labs remarkable for rapid increase in BR, LFTs.   - Avoid hepatotoxic agents  - Repeat CMP  - F/U fractionated BR  - Repeat RUQ US performed overnight (F/U read)    - Surgery following (called for urgent evaluation overnight)   - IR following (called for urgent evaluation overnight)     #Hepatic flexure colitis   CTAP performed earlier over course of admission showing findings of colitis particularly at the hepatic flexure. GI consulted however deferring inpatient colonoscopy due to possibility of diverticulitis as well.    - GI following     RENAL  - Strict I/O  - Krishnamurthy placed (8/11)    HEME  #Leukocytosis   STAT labs ordered showing WBC 16k, likely i/s/o worsening sepsis and active infxn.   - Management of sepsis as below  - F/U differential (added on)    ID  #Sepsis  Stepped up to MICU for worsening sepsis. Likely source acute cholecystitis given trend on CMP.   - Trend lactate to clearance (s/p 2L IVF)   - F/U blood cx  (sent 8/11)   - s/p Meropenem 1g x1 overnight   - ID approval for continued meropenem broad-spectrum     MISC  F: s/p 1L NS x1 + 1L LR x1  E: Replete PRN   N: Diet, NPO   DVT ppx: Holding i/s/o possible surgical intervention   GI ppx: Protonix IV Q24  Bowel: Senna/Miralax    Dispo: MICU    LINES  Peripheral IVs    FULL CODE

## 2022-08-11 NOTE — CHART NOTE - NSCHARTNOTEFT_GEN_A_CORE
Infectious Diseases Anti-infective Approval Note    Medication:  Meropenem  Dose:  1 gram  Route:  IV  Frequency:  q8hrs  Duration**:  3 days    **Duration refers to duration of approval, not recommended duration of treatment       Dose may be adjusted as needed for alterations in renal function.    *THIS IS NOT AN INFECTIOUS DISEASES CONSULTATION*

## 2022-08-11 NOTE — DIETITIAN INITIAL EVALUATION ADULT - OTHER INFO
81 y/o M with past medical history significant for NIDDM, Parkinson's (Sinemet) c/b functional quadriplegia and dementia (A&O x 1-2), HTN, HLD, Depression who had presented to [facility] on 8/5/22 after his home health aide reported patient was pale with hypotension along with increased weakness. He was admitted with severe sepsis secondary to CAP vs aspiration treated with course of antibiotics. He had persistent fever, found on CT scan to have thickening of ascending colon and hepatic flexure with a distended gallbladder, concern for Colitis and cholecystitis GI was subsequently consulted, agreeing with management of likely cholecystitis as primary insult with colitis as secondary insult. He continued on antibiotic regimen, with improvement back to functional baseline. However, overnight on 8/10 into 8/11/22 he developed acute AMS with oxygen desaturation to 80s on 2L NC. He was upgraded from RMF to 7LA, however patient further decompensated with hypotension and progressing hypoxia. He was emergently intubated for airway protection, given empiric dose of Meropenem due to concern for worsening cholecystitis. He was started on high dose pressors peripherally and was transferred per surgical attending from MICU to SICU. Patient with severe septic shock secondary to biliary/GI source, with decompensation requiring life sustaining measures which are not within the goals of care of patient's living will. Palliative consulted, plan for comfort care and palliative extubation.    Pt seen at bedside for initial assessment. Now palliatively extubated. Goals for comfort care. RD to remain available as needed.

## 2022-08-11 NOTE — CHART NOTE - NSCHARTNOTEFT_GEN_A_CORE
Night team called to bedside for rectal temperature 104 and tachycardia to 140s. Pt noted to be progressively hypoxic, initially on RA, had requested 2L earlier in evening for comfort, then desatting to 80s on 2L NC requiring gradual uptitration to 6L NC then NRB. Pt tachypneic, BP 110s then 90s/60s. Pt's mental status worsened, noted per day team to be A&Ox2-3, but at bedside pt moaning and minimally responsive, following commands. Night team called to bedside for rectal temperature 104 and tachycardia to 140s. Pt noted to be progressively hypoxic, initially on RA, had requested 2L earlier in evening for comfort, then desatting to 80s on 2L NC requiring gradual uptitration to 6L NC then NRB. Pt tachypneic, BP 110s then 90s/60s. Pt's mental status worsened, noted per day team to be A&Ox2-3, but at bedside pt moaning and minimally responsive, following commands. Lungs CTAB in anterior fields, cardiac exam tachycardic, no m/r/g appreciated. Abd soft, distended, pt not wincing or moaning on palpation. Extremities warm. Pt with shaking seeming more pronounced that baseline Parkinsonian tremors. Diaphoretic, warm skin.     EKG showed AFib RVR.  CXR worsening R sided opacities.    Labs: blood cultures x1 pending. CBC showed new leukocytosis 8.8-> 16.7 Night team called to bedside for tachycardia to 150s and relative hypotension to 109/49.     Vitals/Exam:   Rectal temperature 104. Pt noted to be progressively hypoxic, initially on RA, had requested 2L earlier in evening for comfort, then desatting to 80s on 2L NC requiring gradual uptitration to 6L NC then NRB. Pt tachypneic, BP 110s then 90s/60s.   Exam: Pt's mental status worsened, noted per day team to be A&Ox2-3, but at bedside pt moaning and minimally responsive, following commands. Lungs CTAB in anterior fields, cardiac exam tachycardic, no m/r/g appreciated. Abd soft, distended, pt not wincing or moaning on palpation. Extremities warm. Pt with shaking seeming more pronounced that baseline Parkinsonian tremors. Diaphoretic, warm skin.     Labs:   blood cultures x1 pending. CBC showed new leukocytosis 8.8-> 16.7. CMP showed newly elevated bilirubin 0.4 -> 1.8, Alk Phos 102 -> 848, AST/ALT 35/17 -> 1109/140. Lactate 2.5. D-dimer 1783 -> 8897. Trop .03 but CKMB, CK WNL.    Imaging:  EKG showed AFib RVR.  CXR worsening R sided opacities.    Night team called ICU consult and Surgery. Pt stepped up to [facility] but given worsening acute hypoxic respiratory failure, was moved to MICU minutes thereafter for likely intubation.     Plan:  #AHRF   Suspect 2/2 aspiration PNA vs. flash pulmonary edema vs. less likely PE.  Plan:  - cont. NRB and consider intubation per ICU team    #Severe sepsis   Suspect 2/2 acute cholecystitis +- aspiration PNA.  Plan:  - 1L bolus NS and ordered for additional IVF thereafter, 1g meropenem x1.   - f/u blood cultures  - obtain second set of blood cultures  - f/u MRSA swab  - obtain urine legionella, strep Ag    #AFib with RVR  Suspect 2/2 severe sepsis  Plan:  - treat underlying sepsis Night team called to bedside for tachycardia to 150s and relative hypotension to 109/49.     Vitals/Exam:   Rectal temperature 104. Pt noted to be progressively hypoxic, initially on RA, had requested 2L earlier in evening for comfort, then desatting to 80s on 2L NC requiring gradual uptitration to 6L NC then NRB. Pt tachypneic, BP 110s then 90s/60s.   Exam: Pt's mental status worsened, noted per day team to be A&Ox2-3, but at bedside pt moaning and minimally responsive, following commands. Lungs CTAB in anterior fields, cardiac exam tachycardic, no m/r/g appreciated. Abd soft, distended, pt not wincing or moaning on palpation. Extremities warm. Pt with shaking seeming more pronounced that baseline Parkinsonian tremors. Diaphoretic, warm skin.     Labs:   blood cultures x1 pending. CBC showed new leukocytosis 8.8-> 16.7. CMP showed newly elevated bilirubin 0.4 -> 1.8, Alk Phos 102 -> 848, AST/ALT 35/17 -> 1109/140. Lactate 2.5. D-dimer 1783 -> 8897. Trop .03 but CKMB, CK WNL.    Imaging:  EKG showed AFib RVR.  CXR worsening R sided opacities.    Night team called ICU consult and Surgery. Pt stepped up to [facility] but given worsening acute hypoxic respiratory failure, was moved to MICU minutes thereafter for likely intubation.     Plan:  #AHRF   Suspect 2/2 aspiration PNA vs. flash pulmonary edema vs. less likely PE.  Plan:  - cont. NRB and consider intubation per ICU team    #Severe sepsis   Suspect 2/2 acute cholecystitis +- aspiration PNA.  Plan:  - 1L bolus NS and ordered for additional IVF thereafter, 1g meropenem x1.   - f/u surgery, IR for GI source control  - repeat lactate  - f/u blood cultures  - obtain second set of blood cultures  - f/u MRSA swab  - obtain urine legionella, strep Ag    #AFib with RVR  Suspect 2/2 severe sepsis  Plan:  - treat underlying sepsis

## 2022-08-11 NOTE — DIETITIAN INITIAL EVALUATION ADULT - PERTINENT MEDS FT
MEDICATIONS  (STANDING):  glycopyrrolate Injectable 0.4 milliGRAM(s) IV Push every 4 hours  HYDROmorphone Infusion. 0.5 mG/Hr (2.5 mL/Hr) IV Continuous <Continuous>  norepinephrine Infusion 0.05 MICROgram(s)/kG/Min (2.84 mL/Hr) IV Continuous <Continuous>  propofol Infusion 10 MICROgram(s)/kG/Min (3.63 mL/Hr) IV Continuous <Continuous>    MEDICATIONS  (PRN):  haloperidol    Injectable 1 milliGRAM(s) IntraMuscular every 1 hour PRN terminal agitation  HYDROmorphone  Injectable 1 milliGRAM(s) IV Push every 1 hour PRN dyspnea, RR>25  LORazepam   Injectable 1 milliGRAM(s) IV Push every 1 hour PRN anxiety/agitation

## 2022-08-11 NOTE — CONSULT NOTE ADULT - PROBLEM SELECTOR RECOMMENDATION 3
Patient with known cholangitis, who is getting treated with IV antibiotics at this time. Per patients HCP joey, he does not want to have any procedures performed and prolong the patients suffering. He would like him to remain comfortable and die comfortably. Continue care as per SICU team.

## 2022-08-11 NOTE — PROGRESS NOTE ADULT - SUBJECTIVE AND OBJECTIVE BOX
ACS attending: Acute/sudden decompensation last evening. Transfer to SICU. Discussed with Dr. Bradshaw - for urgent cholecystostomy, potential PTC. Aggressive resucitation SICU.

## 2022-08-11 NOTE — PROGRESS NOTE ADULT - SUBJECTIVE AND OBJECTIVE BOX
***************************************Transfer from [facility] to [facility]/MICU********************************   ***************************************Transfer from [facility] to [facility]********************************  INTERIM EVENTS: None    SUBJECTIVE:  Patient seen and examined at bedside.  ROS unable to be obtained as pt is intubated and sedated    Vital Signs Last 12 Hrs  T(F): 104 (08-11-22 @ 00:20), Max: 104 (08-11-22 @ 00:20)  HR: 98 (08-11-22 @ 03:39) (98 - 154)  BP: 109/49 (08-11-22 @ 00:20) (109/49 - 166/81)  BP(mean): --  RR: 26 (08-11-22 @ 03:39) (18 - 26)  SpO2: 99% (08-11-22 @ 03:39) (90% - 99%)  I&O's Summary      PHYSICAL EXAM:  Constitutional: NAD, comfortable in bed.  HEENT: NC/AT  Neck: Supple, no JVD  Respiratory: CTA B/L. No w/r/r.   Cardiovascular: RRR, normal S1 and S2, no m/r/g.   Gastrointestinal: +BS, distended abdomen that seems to be TTP in RUQ, no palpable masses   Extremities: wwp; no cyanosis, clubbing or edema.   Vascular: Pulses equal and strong throughout.   Neurological: AAOx0 pt is sedated  Skin: No gross skin abnormalities or rashes        LABS:                        13.0   16.66 )-----------( 348      ( 11 Aug 2022 01:46 )             39.2     08-11    141  |  101  |  21  ----------------------------<  273<H>  3.6   |  23  |  0.72    Ca    8.6      11 Aug 2022 01:46  Phos  3.1     08-10  Mg     2.0     08-10    TPro  5.9<L>  /  Alb  2.8<L>  /  TBili  1.8<H>  /  DBili  1.5<H>  /  AST  1109<H>  /  ALT  140<H>  /  AlkPhos  848<H>  08-11            RADIOLOGY & ADDITIONAL TESTS:    MEDICATIONS  (STANDING):  acetaminophen   IVPB .. 1000 milliGRAM(s) IV Intermittent once  atorvastatin 40 milliGRAM(s) Oral at bedtime  carbidopa/levodopa  25/250 0.5 Tablet(s) Oral four times a day  chlorhexidine 0.12% Liquid 15 milliLiter(s) Oral Mucosa every 12 hours  dextrose 5%. 1000 milliLiter(s) (100 mL/Hr) IV Continuous <Continuous>  dextrose 5%. 1000 milliLiter(s) (50 mL/Hr) IV Continuous <Continuous>  dextrose 50% Injectable 25 Gram(s) IV Push once  dextrose 50% Injectable 12.5 Gram(s) IV Push once  dextrose 50% Injectable 25 Gram(s) IV Push once  glucagon  Injectable 1 milliGRAM(s) IntraMuscular once  insulin lispro (ADMELOG) corrective regimen sliding scale   SubCutaneous Before meals and at bedtime  lactated ringers Bolus 1000 milliLiter(s) IV Bolus once  meropenem  IVPB 1000 milliGRAM(s) IV Intermittent once  metroNIDAZOLE  IVPB      metroNIDAZOLE  IVPB 500 milliGRAM(s) IV Intermittent every 8 hours  norepinephrine Infusion 0.05 MICROgram(s)/kG/Min (5.67 mL/Hr) IV Continuous <Continuous>  pantoprazole  Injectable 40 milliGRAM(s) IV Push every 24 hours  propofol Infusion 10 MICROgram(s)/kG/Min (3.63 mL/Hr) IV Continuous <Continuous>  sodium chloride 0.9% Bolus 1000 milliLiter(s) IV Bolus once    MEDICATIONS  (PRN):  acetaminophen     Tablet .. 650 milliGRAM(s) Oral every 6 hours PRN Temp greater or equal to 38.5C (101.3F)  dextrose Oral Gel 15 Gram(s) Oral once PRN Blood Glucose LESS THAN 70 milliGRAM(s)/deciliter   ***************************************Transfer from [facility] to [address] [place] [address]********************************  HPI:  81 yo M with PMHx Parkinson's, HTN, T2DM (A1C 6.9), HLD, chronic benzo use (Ativan TID at home per HHA, unclear dose) BIBEMS to [facility] ED by [facility] due to concern for weakness. Initially on arrival, stroke code called due to concern for slurred speech and potential facial droop found to have NIHSS 1. CTH negative for acute ischemia but found to have moderate R ICA stenosis, started on ASA/Plavix this admission. Patient was admitted to [facility] for further management of severe sepsis 2/2 PNA due to increased secretions on exam however no clear consolidation on CT/CXR and pt was tx with CTX x5d and Azithromycin x2d for CAP. Hospital course further c/b by abd pain with evidence of colitis at the hepatic flexure for which surgery was consulted. Further imaging with RUQ U/S concerning for acute cholecystitis (distended GB with sludge, trace pericholecystic fluid, wall thickening) however HIDA scan unable to visualize GB. Pt was then started on CTX/Flagyl for empiric intra-abdominal infection/colitis. Pt continued to improve with downtrending WBC, afebrile, and unremarkable abd exam and due to being a poor surgical candidate, recommended for IR perc-judy placement, also deferred due to stable clinical status.     ICU consulted overnight on 8/10 - 8/11 after pt was found to have VS remarkable for T 104F (oral), HR 150s (Afib/RVR on EKG), BP 95/60s, RR 25, SpO2 88-90% on NRB (previously satting well on RA - 2L NC). On exam, pt following commands and opening eyes to voice but noticably rigoring. Heart/lungs clear. Abd distended but soft and some pain with light palpation. CXR with increased opacity on RLL. Blood cx sent. Dosed for IV tylenol 1g and received meropenem 1g x1. Initial STAT labs remarkable for rise in WBC to 18k and significant rise in Alk Phos, AST/ALT, TBili. Surgery made aware of change in status and decision made to step pt up to ICU for worsening severe sepsis likely in setting of acute cholecystitis.       INTERIM EVENTS: None    SUBJECTIVE:  Patient seen and examined at bedside.  ROS unable to be obtained as pt is intubated and sedated    Vital Signs Last 12 Hrs  T(F): 104 (08-11-22 @ 00:20), Max: 104 (08-11-22 @ 00:20)  HR: 98 (08-11-22 @ 03:39) (98 - 154)  BP: 109/49 (08-11-22 @ 00:20) (109/49 - 166/81)  BP(mean): --  RR: 26 (08-11-22 @ 03:39) (18 - 26)  SpO2: 99% (08-11-22 @ 03:39) (90% - 99%)  I&O's Summary      PHYSICAL EXAM:  Constitutional: NAD, comfortable in bed.  HEENT: NC/AT  Neck: Supple, no JVD  Respiratory: CTA B/L. No w/r/r.   Cardiovascular: RRR, normal S1 and S2, no m/r/g.   Gastrointestinal: +BS, distended abdomen that seems to be TTP in RUQ, no palpable masses   Extremities: wwp; no cyanosis, clubbing or edema.   Vascular: Pulses equal and strong throughout.   Neurological: AAOx0 pt is sedated  Skin: No gross skin abnormalities or rashes        LABS:                        13.0   16.66 )-----------( 348      ( 11 Aug 2022 01:46 )             39.2     08-11    141  |  101  |  21  ----------------------------<  273<H>  3.6   |  23  |  0.72    Ca    8.6      11 Aug 2022 01:46  Phos  3.1     08-10  Mg     2.0     08-10    TPro  5.9<L>  /  Alb  2.8<L>  /  TBili  1.8<H>  /  DBili  1.5<H>  /  AST  1109<H>  /  ALT  140<H>  /  AlkPhos  848<H>  08-11            RADIOLOGY & ADDITIONAL TESTS:    MEDICATIONS  (STANDING):  acetaminophen   IVPB .. 1000 milliGRAM(s) IV Intermittent once  atorvastatin 40 milliGRAM(s) Oral at bedtime  carbidopa/levodopa  25/250 0.5 Tablet(s) Oral four times a day  chlorhexidine 0.12% Liquid 15 milliLiter(s) Oral Mucosa every 12 hours  dextrose 5%. 1000 milliLiter(s) (100 mL/Hr) IV Continuous <Continuous>  dextrose 5%. 1000 milliLiter(s) (50 mL/Hr) IV Continuous <Continuous>  dextrose 50% Injectable 25 Gram(s) IV Push once  dextrose 50% Injectable 12.5 Gram(s) IV Push once  dextrose 50% Injectable 25 Gram(s) IV Push once  glucagon  Injectable 1 milliGRAM(s) IntraMuscular once  insulin lispro (ADMELOG) corrective regimen sliding scale   SubCutaneous Before meals and at bedtime  lactated ringers Bolus 1000 milliLiter(s) IV Bolus once  meropenem  IVPB 1000 milliGRAM(s) IV Intermittent once  metroNIDAZOLE  IVPB      metroNIDAZOLE  IVPB 500 milliGRAM(s) IV Intermittent every 8 hours  norepinephrine Infusion 0.05 MICROgram(s)/kG/Min (5.67 mL/Hr) IV Continuous <Continuous>  pantoprazole  Injectable 40 milliGRAM(s) IV Push every 24 hours  propofol Infusion 10 MICROgram(s)/kG/Min (3.63 mL/Hr) IV Continuous <Continuous>  sodium chloride 0.9% Bolus 1000 milliLiter(s) IV Bolus once    MEDICATIONS  (PRN):  acetaminophen     Tablet .. 650 milliGRAM(s) Oral every 6 hours PRN Temp greater or equal to 38.5C (101.3F)  dextrose Oral Gel 15 Gram(s) Oral once PRN Blood Glucose LESS THAN 70 milliGRAM(s)/deciliter

## 2022-08-11 NOTE — PROCEDURE NOTE - NSINDICATIONS_GEN_A_CORE
arterial puncture to obtain ABG's/blood sampling/critical patient/monitoring purposes
critical illness/emergency venous access/hemodynamic monitoring/volume resuscitation

## 2022-08-11 NOTE — CHART NOTE - NSCHARTNOTEFT_GEN_A_CORE
Received patient intubated 2/2 AMS on [facility] as transfer from MICU to SICU, patient with high pressor support on peripheral Levophed running at 0.87mcg with SBP 100s, patient febrile up to 101.1F sweaty, cool extremities with minimal urine output in patterson catheter. IR was consulted, plan for emergent bedside percutaneous cholecystostomy tube +/- PTC depending on US findings by attending during procedure. Patient had received STAT dose from MICU team of Meropenem, ID antimicrobial attending consulted and approved for Meropenem. 1L lactated ringer bolus given x1. Decision made to place emergent RIJ TLC for access due to high dose of peripheral levophed, as HCP was unable to be contacted.     HCP Lonny Andersen (Cell: [phone]) was notified of patient's decline by IR team, and per their discussion patient has living will that states that patient does not want extraordinary measures, and would not sign consent for IR procedure. I then called Lonny and he reported that 's office would be forwarding paperwork for living will to hospital this AM, confirmed [facility] fax number. I then returned phone call to Lonny, again re-iterating that the patient's (Loyd's) state is reversible if we are able to drain the infection in his gall bladder. Per Lonny, he states that he has "had no quality of life" and that "he cannot wipe his own ass" and lives at home with 24 hour health care aide, requiring assistance with all ADLs. Both Lonny and I extensively discussed that per Loyd's living will, he is DNR and would not want any extraordinary measures that would be considered "life sustaining measures" Again per living will "forego all life sustaining treatment. ...if I am unable to eat and drink without assistance".    Primary team, IR and ICU team and attendings all notified of decision by HCP and interpretation of living will based on his state prior to admission. Patient made DNR, will consult palliative for further recommendations for withdrawal of care and to keep patient comfortable and free from pain.

## 2022-08-11 NOTE — CONSULT NOTE ADULT - SUBJECTIVE AND OBJECTIVE BOX
***** ICU CONSULT *****    HPI:      Allergies  No Known Allergies      Home Medications:  aspirin 81 mg oral tablet: 1 tab(s) orally once a day (05 Aug 2022 13:47)  carbidopa-levodopa 25 mg-250 mg oral tablet: 0.5 tab(s) orally 4 times a day (05 Aug 2022 13:47)  Centrum oral tablet: 1 tab(s) orally once a day (05 Aug 2022 13:47)  Crestor 20 mg oral tablet: 1 tab(s) orally once a day (at bedtime) (05 Aug 2022 13:47)  doxepin 10 mg oral capsule: 1 cap(s) orally once a day  Can give up to 2 caps per day (05 Aug 2022 13:47)  Fish Oil oral capsule: 1 cap(s) orally once a day (05 Aug 2022 13:47)  LORAZEPAM  1 MG TABS: 1 tab(s) orally 4 times a day (05 Aug 2022 13:47)  metFORMIN 750 mg oral tablet, extended release: 2 tab(s) orally once a day (05 Aug 2022 13:47)  NIFEdipine 30 mg oral tablet, extended release: 1 tab(s) orally once a day (05 Aug 2022 13:47)  OLANZapine 7.5 mg oral tablet: 1 tab(s) orally once a day (at bedtime) (05 Aug 2022 13:47)  quinapril 40 mg oral tablet: 1 tab(s) orally once a day (05 Aug 2022 13:47)  Santyl 250 units/g topical ointment: Apply topically to affected area once a day (05 Aug 2022 13:47)  Vitamin B12 1000 mcg oral tablet: 1 tab(s) orally once a day (05 Aug 2022 13:47)  Vitamin D3 25 mcg (1000 intl units) oral tablet: 1 tab(s) orally once a day (05 Aug 2022 13:47)      PAST MEDICAL & SURGICAL HISTORY:  DM2 (diabetes mellitus, type 2)  Parkinsons  Hypertension  Hyperlipidemia  Depression  Melanoma  Depression  Tinnitus of left ear  Vertigo  No significant past surgical history          FAMILY HISTORY:  Family history of stroke  No known cardiovascular or pulmonary family history         VITAL SIGNS:  T(C): 40 (08-11-22 @ 00:20), Max: 40 (08-11-22 @ 00:20)  T(F): 104 (08-11-22 @ 00:20), Max: 104 (08-11-22 @ 00:20)  HR: 154 (08-11-22 @ 00:20) (91 - 154)  BP: 109/49 (08-11-22 @ 00:20) (109/49 - 166/81)  BP(mean): --  RR: 18 (08-10-22 @ 22:50) (18 - 18)  SpO2: 92% (08-11-22 @ 00:20) (90% - 95%)    PHYSICAL EXAM:    Constitutional: WDWN resting comfortably in bed; NAD  Head: NC/AT  Eyes: PERRL, EOMI, clear conjunctiva  ENT: no nasal discharge; uvula midline, no oropharyngeal erythema or exudates; MMM  Neck: supple; no JVD or thyromegaly  Respiratory: CTA B/L; no W/R/R, no retractions  Cardiac: +S1/S2; RRR; no M/R/G;  Gastrointestinal: soft, NT/ND; no rebound or guarding; +BSx4  Extremities: WWP, no clubbing or cyanosis; no peripheral edema  Musculoskeletal: NROM x4; no joint swelling, tenderness or erythema  Vascular: 2+ radial, femoral, DP/PT pulses B/L  Dermatologic: skin warm, dry and intact; no rashes, wounds, or scars  Neurologic: AAOx3; CNII-XII grossly intact; no focal deficits  Psychiatric: affect and characteristics of appearance, verbalizations, behaviors are appropriate      LABS:                          12.4   8.80  )-----------( 197      ( 10 Aug 2022 07:24 )             37.6                                               08-10    145  |  107  |  16  ----------------------------<  191<H>  3.0<L>   |  27  |  0.51    Ca    8.3<L>      10 Aug 2022 07:24  Phos  3.1     08-10  Mg     2.0     08-10    TPro  5.8<L>  /  Alb  2.5<L>  /  TBili  0.4  /  DBili  x   /  AST  35  /  ALT  17  /  AlkPhos  102  08-10       LIVER FUNCTIONS - ( 10 Aug 2022 07:24 )  Alb: 2.5 g/dL / Pro: 5.8 g/dL / ALK PHOS: 102 U/L / ALT: 17 U/L / AST: 35 U/L / GGT: x                                                  MEDICATIONS  (STANDING):  atorvastatin 40 milliGRAM(s) Oral at bedtime  carbidopa/levodopa  25/250 0.5 Tablet(s) Oral four times a day  cefTRIAXone   IVPB 1000 milliGRAM(s) IV Intermittent every 24 hours  dextrose 5%. 1000 milliLiter(s) (50 mL/Hr) IV Continuous <Continuous>  dextrose 5%. 1000 milliLiter(s) (100 mL/Hr) IV Continuous <Continuous>  dextrose 50% Injectable 25 Gram(s) IV Push once  dextrose 50% Injectable 12.5 Gram(s) IV Push once  dextrose 50% Injectable 25 Gram(s) IV Push once  glucagon  Injectable 1 milliGRAM(s) IntraMuscular once  heparin   Injectable 5000 Unit(s) SubCutaneous every 8 hours  insulin lispro (ADMELOG) corrective regimen sliding scale   SubCutaneous Before meals and at bedtime  lactated ringers. 1000 milliLiter(s) (100 mL/Hr) IV Continuous <Continuous>  lisinopril 40 milliGRAM(s) Oral daily  LORazepam     Tablet 1 milliGRAM(s) Oral every 12 hours  metroNIDAZOLE  IVPB      metroNIDAZOLE  IVPB 500 milliGRAM(s) IV Intermittent every 8 hours  polyethylene glycol 3350 17 Gram(s) Oral every 24 hours  senna 1 Tablet(s) Oral every 24 hours    MEDICATIONS  (PRN):  acetaminophen     Tablet .. 650 milliGRAM(s) Oral every 6 hours PRN Temp greater or equal to 38.5C (101.3F)  dextrose Oral Gel 15 Gram(s) Oral once PRN Blood Glucose LESS THAN 70 milliGRAM(s)/deciliter           ***** ICU CONSULT *****    HPI:  83 yo M with PMHx Parkinson's, HTN, T2DM (A1C 6.9), HLD, chronic benzo use (Ativan TID at home per HHA, unclear dose) BIBEMS to [facility] ED by [facility] due to concern for weakness. Initially on arrival, stroke code called due to concern for slurred speech and potential facial droop found to have NIHSS 1. CTH negative for acute ischemia but found to have moderate R ICA stenosis, started on ASA/Plavix this admission. Patient was admitted to [facility] for further management of severe sepsis 2/2 PNA due to increased secretions on exam however no clear consolidation on CT/CXR and pt was tx with CTX x5d and Azithromycin x2d (DC'd after negative Legionella) for CAP. Hospital course further c/b by abd pain with evidence of colitis at the hepatic flexure for which surgery was consulted. Further imaging with RUQ U/S concerning for acute cholecystitis (distended GB with sludge, trace pericholecystic fluid, wall thickening) however HIDA scan unable to visualize GB. Pt was then started on CTX/Flagyl for empiric intra-abdominal infxn/colitis. Pt continued to improve with downtrending WBC, afebrile, and unremarkable abd exam and due to being a poor surgical candidate, recommended for IR perc-judy placement, also deferred due to stable clinical status.     ICU consulted overnight on 8/10 - 8/11 after pt was found to have VS remarkable for T 104F (oral), HR 150s (Afib/RVR on EKG), BP 95/60s, RR 25, SpO2 88-90% on NRB (previously satting well on RA - 2L NC). On exam, pt following commands and opening eyes to voice but noticably rigoring. Heart/lungs clear. Abd distended but soft and some pain with light palpation. CXR with increased opacity on RLL. Blood cx sent. Dosed for IV tylenol 1g and received meropenem 1g x1. Initial STAT labs remarkable for rise in WBC to 18k and significant rise in Alk Phos, AST/ALT, TBili. Surgery made aware of change in status and decision made to step pt up to 7LA/telemetry for worsening severe sepsis likely in setting of acute cholecystitis.       Allergies  No Known Allergies      Home Medications:  aspirin 81 mg oral tablet: 1 tab(s) orally once a day (05 Aug 2022 13:47)  carbidopa-levodopa 25 mg-250 mg oral tablet: 0.5 tab(s) orally 4 times a day (05 Aug 2022 13:47)  Centrum oral tablet: 1 tab(s) orally once a day (05 Aug 2022 13:47)  Crestor 20 mg oral tablet: 1 tab(s) orally once a day (at bedtime) (05 Aug 2022 13:47)  doxepin 10 mg oral capsule: 1 cap(s) orally once a day  Can give up to 2 caps per day (05 Aug 2022 13:47)  Fish Oil oral capsule: 1 cap(s) orally once a day (05 Aug 2022 13:47)  LORAZEPAM  1 MG TABS: 1 tab(s) orally 4 times a day (05 Aug 2022 13:47)  metFORMIN 750 mg oral tablet, extended release: 2 tab(s) orally once a day (05 Aug 2022 13:47)  NIFEdipine 30 mg oral tablet, extended release: 1 tab(s) orally once a day (05 Aug 2022 13:47)  OLANZapine 7.5 mg oral tablet: 1 tab(s) orally once a day (at bedtime) (05 Aug 2022 13:47)  quinapril 40 mg oral tablet: 1 tab(s) orally once a day (05 Aug 2022 13:47)  Santyl 250 units/g topical ointment: Apply topically to affected area once a day (05 Aug 2022 13:47)  Vitamin B12 1000 mcg oral tablet: 1 tab(s) orally once a day (05 Aug 2022 13:47)  Vitamin D3 25 mcg (1000 intl units) oral tablet: 1 tab(s) orally once a day (05 Aug 2022 13:47)      PAST MEDICAL & SURGICAL HISTORY:  DM2 (diabetes mellitus, type 2)  Parkinsons  Hypertension  Hyperlipidemia  Depression  Melanoma  Depression  Tinnitus of left ear  Vertigo  No significant past surgical history          FAMILY HISTORY:  Family history of stroke  No known cardiovascular or pulmonary family history         VITAL SIGNS:  T(C): 40 (08-11-22 @ 00:20), Max: 40 (08-11-22 @ 00:20)  T(F): 104 (08-11-22 @ 00:20), Max: 104 (08-11-22 @ 00:20)  HR: 154 (08-11-22 @ 00:20) (91 - 154)  BP: 109/49 (08-11-22 @ 00:20) (109/49 - 166/81)  RR: 18 (08-10-22 @ 22:50) (18 - 18)  SpO2: 92% (08-11-22 @ 00:20) (90% - 95%)    PHYSICAL EXAM:  Constitutional: WDWN resting comfortably in bed; NAD  Head: NC/AT  Eyes: PERRL, EOMI, clear conjunctiva  ENT: no nasal discharge; uvula midline, no oropharyngeal erythema or exudates; MMM  Neck: supple; no JVD or thyromegaly  Respiratory: CTA B/L; no W/R/R, no retractions  Cardiac: +S1/S2; RRR; no M/R/G;  Gastrointestinal: soft, NT/ND; no rebound or guarding; +BSx4  Extremities: WWP, no clubbing or cyanosis; no peripheral edema  Musculoskeletal: NROM x4; no joint swelling, tenderness or erythema  Vascular: 2+ radial, femoral, DP/PT pulses B/L  Dermatologic: skin warm, dry and intact; no rashes, wounds, or scars  Neurologic: AAOx3; CNII-XII grossly intact; no focal deficits  Psychiatric: affect and characteristics of appearance, verbalizations, behaviors are appropriate      LABS:                          12.4   8.80  )-----------( 197      ( 10 Aug 2022 07:24 )             37.6                                               08-10    145  |  107  |  16  ----------------------------<  191<H>  3.0<L>   |  27  |  0.51    Ca    8.3<L>      10 Aug 2022 07:24  Phos  3.1     08-10  Mg     2.0     08-10    TPro  5.8<L>  /  Alb  2.5<L>  /  TBili  0.4  /  DBili  x   /  AST  35  /  ALT  17  /  AlkPhos  102  08-10       LIVER FUNCTIONS - ( 10 Aug 2022 07:24 )  Alb: 2.5 g/dL / Pro: 5.8 g/dL / ALK PHOS: 102 U/L / ALT: 17 U/L / AST: 35 U/L / GGT: x                                                  MEDICATIONS  (STANDING):  atorvastatin 40 milliGRAM(s) Oral at bedtime  carbidopa/levodopa  25/250 0.5 Tablet(s) Oral four times a day  cefTRIAXone   IVPB 1000 milliGRAM(s) IV Intermittent every 24 hours  dextrose 5%. 1000 milliLiter(s) (50 mL/Hr) IV Continuous <Continuous>  dextrose 5%. 1000 milliLiter(s) (100 mL/Hr) IV Continuous <Continuous>  dextrose 50% Injectable 25 Gram(s) IV Push once  dextrose 50% Injectable 12.5 Gram(s) IV Push once  dextrose 50% Injectable 25 Gram(s) IV Push once  glucagon  Injectable 1 milliGRAM(s) IntraMuscular once  heparin   Injectable 5000 Unit(s) SubCutaneous every 8 hours  insulin lispro (ADMELOG) corrective regimen sliding scale   SubCutaneous Before meals and at bedtime  lactated ringers. 1000 milliLiter(s) (100 mL/Hr) IV Continuous <Continuous>  lisinopril 40 milliGRAM(s) Oral daily  LORazepam     Tablet 1 milliGRAM(s) Oral every 12 hours  metroNIDAZOLE  IVPB      metroNIDAZOLE  IVPB 500 milliGRAM(s) IV Intermittent every 8 hours  polyethylene glycol 3350 17 Gram(s) Oral every 24 hours  senna 1 Tablet(s) Oral every 24 hours    MEDICATIONS  (PRN):  acetaminophen     Tablet .. 650 milliGRAM(s) Oral every 6 hours PRN Temp greater or equal to 38.5C (101.3F)  dextrose Oral Gel 15 Gram(s) Oral once PRN Blood Glucose LESS THAN 70 milliGRAM(s)/deciliter           ***** ICU CONSULT *****    HPI:  81 yo M with PMHx Parkinson's, HTN, T2DM (A1C 6.9), HLD, chronic benzo use (Ativan TID at home per HHA, unclear dose) BIBEMS to [facility] ED by [facility] due to concern for weakness. Initially on arrival, stroke code called due to concern for slurred speech and potential facial droop found to have NIHSS 1. CTH negative for acute ischemia but found to have moderate R ICA stenosis, started on ASA/Plavix this admission. Patient was admitted to [facility] for further management of severe sepsis 2/2 PNA due to increased secretions on exam however no clear consolidation on CT/CXR and pt was tx with CTX x5d and Azithromycin x2d for CAP. Hospital course further c/b by abd pain with evidence of colitis at the hepatic flexure for which surgery was consulted. Further imaging with RUQ U/S concerning for acute cholecystitis (distended GB with sludge, trace pericholecystic fluid, wall thickening) however HIDA scan unable to visualize GB. Pt was then started on CTX/Flagyl for empiric intra-abdominal infxn/colitis. Pt continued to improve with downtrending WBC, afebrile, and unremarkable abd exam and due to being a poor surgical candidate, recommended for IR perc-judy placement, also deferred due to stable clinical status.     ICU consulted overnight on 8/10 - 8/11 after pt was found to have VS remarkable for T 104F (oral), HR 150s (Afib/RVR on EKG), BP 95/60s, RR 25, SpO2 88-90% on NRB (previously satting well on RA - 2L NC). On exam, pt following commands and opening eyes to voice but noticably rigoring. Heart/lungs clear. Abd distended but soft and some pain with light palpation. CXR with increased opacity on RLL. Blood cx sent. Dosed for IV tylenol 1g and received meropenem 1g x1. Initial STAT labs remarkable for rise in WBC to 18k and significant rise in Alk Phos, AST/ALT, TBili. Surgery made aware of change in status and decision made to step pt up to 7LA/telemetry for worsening severe sepsis likely in setting of acute cholecystitis.       Allergies  No Known Allergies      Home Medications:  aspirin 81 mg oral tablet: 1 tab(s) orally once a day (05 Aug 2022 13:47)  carbidopa-levodopa 25 mg-250 mg oral tablet: 0.5 tab(s) orally 4 times a day (05 Aug 2022 13:47)  Centrum oral tablet: 1 tab(s) orally once a day (05 Aug 2022 13:47)  Crestor 20 mg oral tablet: 1 tab(s) orally once a day (at bedtime) (05 Aug 2022 13:47)  doxepin 10 mg oral capsule: 1 cap(s) orally once a day  Can give up to 2 caps per day (05 Aug 2022 13:47)  Fish Oil oral capsule: 1 cap(s) orally once a day (05 Aug 2022 13:47)  LORAZEPAM  1 MG TABS: 1 tab(s) orally 4 times a day (05 Aug 2022 13:47)  metFORMIN 750 mg oral tablet, extended release: 2 tab(s) orally once a day (05 Aug 2022 13:47)  NIFEdipine 30 mg oral tablet, extended release: 1 tab(s) orally once a day (05 Aug 2022 13:47)  OLANZapine 7.5 mg oral tablet: 1 tab(s) orally once a day (at bedtime) (05 Aug 2022 13:47)  quinapril 40 mg oral tablet: 1 tab(s) orally once a day (05 Aug 2022 13:47)  Santyl 250 units/g topical ointment: Apply topically to affected area once a day (05 Aug 2022 13:47)  Vitamin B12 1000 mcg oral tablet: 1 tab(s) orally once a day (05 Aug 2022 13:47)  Vitamin D3 25 mcg (1000 intl units) oral tablet: 1 tab(s) orally once a day (05 Aug 2022 13:47)      PAST MEDICAL & SURGICAL HISTORY:  DM2 (diabetes mellitus, type 2)  Parkinsons  Hypertension  Hyperlipidemia  Depression  Melanoma  Depression  Tinnitus of left ear  Vertigo  No significant past surgical history          FAMILY HISTORY:  Family history of stroke  No known cardiovascular or pulmonary family history         VITAL SIGNS:  T(C): 40 (08-11-22 @ 00:20), Max: 40 (08-11-22 @ 00:20)  T(F): 104 (08-11-22 @ 00:20), Max: 104 (08-11-22 @ 00:20)  HR: 154 (08-11-22 @ 00:20) (91 - 154)  BP: 109/49 (08-11-22 @ 00:20) (109/49 - 166/81)  RR: 18 (08-10-22 @ 22:50) (18 - 18)  SpO2: 92% (08-11-22 @ 00:20) (90% - 95%)    PHYSICAL EXAM:  Constitutional: WDWN resting comfortably in bed; NAD  Head: NC/AT  Eyes: PERRL, EOMI, clear conjunctiva  ENT: no nasal discharge; uvula midline, no oropharyngeal erythema or exudates; MMM  Neck: supple; no JVD or thyromegaly  Respiratory: CTA B/L; no W/R/R, no retractions  Cardiac: +S1/S2; RRR; no M/R/G;  Gastrointestinal: soft, NT/ND; no rebound or guarding; +BSx4  Extremities: WWP, no clubbing or cyanosis; no peripheral edema  Musculoskeletal: NROM x4; no joint swelling, tenderness or erythema  Vascular: 2+ radial, femoral, DP/PT pulses B/L  Dermatologic: skin warm, dry and intact; no rashes, wounds, or scars  Neurologic: AAOx3; CNII-XII grossly intact; no focal deficits  Psychiatric: affect and characteristics of appearance, verbalizations, behaviors are appropriate      LABS:                          12.4   8.80  )-----------( 197      ( 10 Aug 2022 07:24 )             37.6                                               08-10    145  |  107  |  16  ----------------------------<  191<H>  3.0<L>   |  27  |  0.51    Ca    8.3<L>      10 Aug 2022 07:24  Phos  3.1     08-10  Mg     2.0     08-10    TPro  5.8<L>  /  Alb  2.5<L>  /  TBili  0.4  /  DBili  x   /  AST  35  /  ALT  17  /  AlkPhos  102  08-10       LIVER FUNCTIONS - ( 10 Aug 2022 07:24 )  Alb: 2.5 g/dL / Pro: 5.8 g/dL / ALK PHOS: 102 U/L / ALT: 17 U/L / AST: 35 U/L / GGT: x                                                  MEDICATIONS  (STANDING):  atorvastatin 40 milliGRAM(s) Oral at bedtime  carbidopa/levodopa  25/250 0.5 Tablet(s) Oral four times a day  cefTRIAXone   IVPB 1000 milliGRAM(s) IV Intermittent every 24 hours  dextrose 5%. 1000 milliLiter(s) (50 mL/Hr) IV Continuous <Continuous>  dextrose 5%. 1000 milliLiter(s) (100 mL/Hr) IV Continuous <Continuous>  dextrose 50% Injectable 25 Gram(s) IV Push once  dextrose 50% Injectable 12.5 Gram(s) IV Push once  dextrose 50% Injectable 25 Gram(s) IV Push once  glucagon  Injectable 1 milliGRAM(s) IntraMuscular once  heparin   Injectable 5000 Unit(s) SubCutaneous every 8 hours  insulin lispro (ADMELOG) corrective regimen sliding scale   SubCutaneous Before meals and at bedtime  lactated ringers. 1000 milliLiter(s) (100 mL/Hr) IV Continuous <Continuous>  lisinopril 40 milliGRAM(s) Oral daily  LORazepam     Tablet 1 milliGRAM(s) Oral every 12 hours  metroNIDAZOLE  IVPB      metroNIDAZOLE  IVPB 500 milliGRAM(s) IV Intermittent every 8 hours  polyethylene glycol 3350 17 Gram(s) Oral every 24 hours  senna 1 Tablet(s) Oral every 24 hours    MEDICATIONS  (PRN):  acetaminophen     Tablet .. 650 milliGRAM(s) Oral every 6 hours PRN Temp greater or equal to 38.5C (101.3F)  dextrose Oral Gel 15 Gram(s) Oral once PRN Blood Glucose LESS THAN 70 milliGRAM(s)/deciliter           ***** ICU CONSULT *****    HPI:  83 yo M with PMHx Parkinson's, HTN, T2DM (A1C 6.9), HLD, chronic benzo use (Ativan TID at home per HHA, unclear dose) BIBEMS to [facility] ED by [facility] due to concern for weakness. Initially on arrival, stroke code called due to concern for slurred speech and potential facial droop found to have NIHSS 1. CTH negative for acute ischemia but found to have moderate R ICA stenosis, started on ASA/Plavix this admission. Patient was admitted to [facility] for further management of severe sepsis 2/2 PNA due to increased secretions on exam however no clear consolidation on CT/CXR and pt was tx with CTX x5d and Azithromycin x2d for CAP. Hospital course further c/b by abd pain with evidence of colitis at the hepatic flexure for which surgery was consulted. Further imaging with RUQ U/S concerning for acute cholecystitis (distended GB with sludge, trace pericholecystic fluid, wall thickening) however HIDA scan unable to visualize GB. Pt was then started on CTX/Flagyl for empiric intra-abdominal infxn/colitis. Pt continued to improve with downtrending WBC, afebrile, and unremarkable abd exam and due to being a poor surgical candidate, recommended for IR perc-judy placement, also deferred due to stable clinical status.     ICU consulted overnight on 8/10 - 8/11 after pt was found to have VS remarkable for T 104F (oral), HR 150s (Afib/RVR on EKG), BP 95/60s, RR 25, SpO2 88-90% on NRB (previously satting well on RA - 2L NC). On exam, pt following commands and opening eyes to voice but noticably rigoring. Heart/lungs clear. Abd distended but soft and some pain with light palpation. CXR with increased opacity on RLL. Blood cx sent. Dosed for IV tylenol 1g and received meropenem 1g x1. Initial STAT labs remarkable for rise in WBC to 18k and significant rise in Alk Phos, AST/ALT, TBili. Surgery made aware of change in status and decision made to step pt up to ICU for worsening severe sepsis likely in setting of acute cholecystitis.       Allergies  No Known Allergies      Home Medications:  aspirin 81 mg oral tablet: 1 tab(s) orally once a day (05 Aug 2022 13:47)  carbidopa-levodopa 25 mg-250 mg oral tablet: 0.5 tab(s) orally 4 times a day (05 Aug 2022 13:47)  Centrum oral tablet: 1 tab(s) orally once a day (05 Aug 2022 13:47)  Crestor 20 mg oral tablet: 1 tab(s) orally once a day (at bedtime) (05 Aug 2022 13:47)  doxepin 10 mg oral capsule: 1 cap(s) orally once a day  Can give up to 2 caps per day (05 Aug 2022 13:47)  Fish Oil oral capsule: 1 cap(s) orally once a day (05 Aug 2022 13:47)  LORAZEPAM  1 MG TABS: 1 tab(s) orally 4 times a day (05 Aug 2022 13:47)  metFORMIN 750 mg oral tablet, extended release: 2 tab(s) orally once a day (05 Aug 2022 13:47)  NIFEdipine 30 mg oral tablet, extended release: 1 tab(s) orally once a day (05 Aug 2022 13:47)  OLANZapine 7.5 mg oral tablet: 1 tab(s) orally once a day (at bedtime) (05 Aug 2022 13:47)  quinapril 40 mg oral tablet: 1 tab(s) orally once a day (05 Aug 2022 13:47)  Santyl 250 units/g topical ointment: Apply topically to affected area once a day (05 Aug 2022 13:47)  Vitamin B12 1000 mcg oral tablet: 1 tab(s) orally once a day (05 Aug 2022 13:47)  Vitamin D3 25 mcg (1000 intl units) oral tablet: 1 tab(s) orally once a day (05 Aug 2022 13:47)      PAST MEDICAL & SURGICAL HISTORY:  DM2 (diabetes mellitus, type 2)  Parkinsons  Hypertension  Hyperlipidemia  Depression  Melanoma  Depression  Tinnitus of left ear  Vertigo  No significant past surgical history          FAMILY HISTORY:  Family history of stroke  No known cardiovascular or pulmonary family history         VITAL SIGNS:  T(C): 40 (08-11-22 @ 00:20), Max: 40 (08-11-22 @ 00:20)  T(F): 104 (08-11-22 @ 00:20), Max: 104 (08-11-22 @ 00:20)  HR: 154 (08-11-22 @ 00:20) (91 - 154)  BP: 109/49 (08-11-22 @ 00:20) (109/49 - 166/81)  RR: 18 (08-10-22 @ 22:50) (18 - 18)  SpO2: 92% (08-11-22 @ 00:20) (90% - 95%)    PHYSICAL EXAM:  Constitutional: WDWN resting comfortably in bed; NAD  Head: NC/AT  Eyes: PERRL, EOMI, clear conjunctiva  ENT: no nasal discharge; uvula midline, no oropharyngeal erythema or exudates; MMM  Neck: supple; no JVD or thyromegaly  Respiratory: CTA B/L; no W/R/R, no retractions  Cardiac: +S1/S2; RRR; no M/R/G;  Gastrointestinal: soft, NT/ND; no rebound or guarding; +BSx4  Extremities: WWP, no clubbing or cyanosis; no peripheral edema  Musculoskeletal: NROM x4; no joint swelling, tenderness or erythema  Vascular: 2+ radial, femoral, DP/PT pulses B/L  Dermatologic: skin warm, dry and intact; no rashes, wounds, or scars  Neurologic: AAOx3; CNII-XII grossly intact; no focal deficits  Psychiatric: affect and characteristics of appearance, verbalizations, behaviors are appropriate      LABS:                          12.4   8.80  )-----------( 197      ( 10 Aug 2022 07:24 )             37.6                                               08-10    145  |  107  |  16  ----------------------------<  191<H>  3.0<L>   |  27  |  0.51    Ca    8.3<L>      10 Aug 2022 07:24  Phos  3.1     08-10  Mg     2.0     08-10    TPro  5.8<L>  /  Alb  2.5<L>  /  TBili  0.4  /  DBili  x   /  AST  35  /  ALT  17  /  AlkPhos  102  08-10       LIVER FUNCTIONS - ( 10 Aug 2022 07:24 )  Alb: 2.5 g/dL / Pro: 5.8 g/dL / ALK PHOS: 102 U/L / ALT: 17 U/L / AST: 35 U/L / GGT: x                                                  MEDICATIONS  (STANDING):  atorvastatin 40 milliGRAM(s) Oral at bedtime  carbidopa/levodopa  25/250 0.5 Tablet(s) Oral four times a day  cefTRIAXone   IVPB 1000 milliGRAM(s) IV Intermittent every 24 hours  dextrose 5%. 1000 milliLiter(s) (50 mL/Hr) IV Continuous <Continuous>  dextrose 5%. 1000 milliLiter(s) (100 mL/Hr) IV Continuous <Continuous>  dextrose 50% Injectable 25 Gram(s) IV Push once  dextrose 50% Injectable 12.5 Gram(s) IV Push once  dextrose 50% Injectable 25 Gram(s) IV Push once  glucagon  Injectable 1 milliGRAM(s) IntraMuscular once  heparin   Injectable 5000 Unit(s) SubCutaneous every 8 hours  insulin lispro (ADMELOG) corrective regimen sliding scale   SubCutaneous Before meals and at bedtime  lactated ringers. 1000 milliLiter(s) (100 mL/Hr) IV Continuous <Continuous>  lisinopril 40 milliGRAM(s) Oral daily  LORazepam     Tablet 1 milliGRAM(s) Oral every 12 hours  metroNIDAZOLE  IVPB      metroNIDAZOLE  IVPB 500 milliGRAM(s) IV Intermittent every 8 hours  polyethylene glycol 3350 17 Gram(s) Oral every 24 hours  senna 1 Tablet(s) Oral every 24 hours    MEDICATIONS  (PRN):  acetaminophen     Tablet .. 650 milliGRAM(s) Oral every 6 hours PRN Temp greater or equal to 38.5C (101.3F)  dextrose Oral Gel 15 Gram(s) Oral once PRN Blood Glucose LESS THAN 70 milliGRAM(s)/deciliter

## 2022-08-11 NOTE — CHART NOTE - NSCHARTNOTEFT_GEN_A_CORE
PALLIATIVE MEDICINE COORDINATION OF CARE NOTE FOR [facility]  [  ] ED Trigger   [  ] MICU Trigger     [ X] Consult    [  ] AI Comanagement    Never seen by palliative in the past.     _30_____ Minutes; Start: ___0930am__  End: _1000am___, of non-face-to-face prolonged service provided that relates to (face-to-face) care that has or will occur and ongoing patient management, including one or more of the following:   - Reviewed records from other physicians or other health care professional services, including one or more of the following: other medical records and diagnostic / radiology study results     HPI:  81yo M with h/o Parkinsons, HTN, DM, HLD presents after the home health aid called EMS. The HHA at bedside states he called EMS because the patient looked extremely pale and had a blood pressure of 77/60. Patient states that the blood pressure did not get better after he took all his medications. HHA states patient was not able to walk in the past few days and it was due to worsening weakness. His appetite was unchanged. He complains of recent coughing fits over the past few months and now he states that he has a bunch of mucus in his mouth but otherwise feels totally fine. Per HHA patient is now at baseline and the only difference is he is breathing a little heavier. Patients last bowel movement was 4 days ago which he states is standard for him and has no other complaints at this time.    In the ED:  Vitals: Temp 98.3 HR 94 /78 O2 Sat 96%  Labs: WBC 18 Hg 13.9 Plt 168 PTT 32 PT 16 INR 1.39 Lactate 3.3, Na 139 K 3.6 Cl 103 Bicarb 26 AG 10 Cr 1.75 Glucose 313Calcium 8.9   Imaging:  Intervention: Stroke Code called - deemed negative. Azithromycin 500, Ceftriaxone 1g, heparin 5000 q8, 1 L NS   (05 Aug 2022 03:20)      - Other: iStop reviewed.    Rx found on iStop review. Ref #: [id number]  07/12/2022	08/02/2022	lorazepam 1 mg tablet	120	30	Alexandre Roman MD	[id number]	Medicare	[facility] #[id number]  06/08/2022	07/01/2022	lorazepam 1 mg tablet	120	30	Alexandre Roman MD	[id number]	Medicare	[facility] #[id number]  05/11/2022	06/01/2022	lorazepam 1 mg tablet	120	30	Alexandre Roman MD	[id number]	Medicare	[facility] #[id number]  04/04/2022	05/02/2022	lorazepam 1 mg tablet	120	30	MarlaoniAlexandre almanza MD	[id number]	Medicare	[facility] #[id number]  03/08/2022	04/01/2022	lorazepam 1 mg tablet	120	30	MarlaoniAlexandre almanza MD	[id number]	Medicare	[facility] #[id number]  02/14/2022	03/10/2022	lorazepam 1 mg tablet	90	30	MarlaoniAlexandre amlanza MD	[id number]	Medicare	[facility] #[id number]  03/01/2022	03/04/2022	loreev xr 3 mg capsule	3	3	AddoniAlexandre almanza MD	[id number]	Insurance	[facility] #[id number]  01/18/2022	02/06/2022	lorazepam 1 mg tablet	90	30	Alexandre Roman MD	[id number]	Medicare	[facility] #[id number]    - Other: Medication reviewed.    The patient HAS NOT used PRN's in the last 24h.    MEDICATIONS  (STANDING):  atorvastatin 40 milliGRAM(s) Oral at bedtime  chlorhexidine 0.12% Liquid 15 milliLiter(s) Oral Mucosa every 12 hours  chlorhexidine 4% Liquid 1 Application(s) Topical <User Schedule>  dextrose 5%. 1000 milliLiter(s) (50 mL/Hr) IV Continuous <Continuous>  dextrose 5%. 1000 milliLiter(s) (100 mL/Hr) IV Continuous <Continuous>  dextrose 50% Injectable 25 Gram(s) IV Push once  dextrose 50% Injectable 12.5 Gram(s) IV Push once  dextrose 50% Injectable 25 Gram(s) IV Push once  glucagon  Injectable 1 milliGRAM(s) IntraMuscular once  insulin lispro (ADMELOG) corrective regimen sliding scale   SubCutaneous Before meals and at bedtime  norepinephrine Infusion 0.05 MICROgram(s)/kG/Min (2.84 mL/Hr) IV Continuous <Continuous>  pantoprazole  Injectable 40 milliGRAM(s) IV Push every 24 hours  propofol Infusion 10 MICROgram(s)/kG/Min (3.63 mL/Hr) IV Continuous <Continuous>    MEDICATIONS  (PRN):  dextrose Oral Gel 15 Gram(s) Oral once PRN Blood Glucose LESS THAN 70 milliGRAM(s)/deciliter  sodium chloride 0.9% lock flush 10 milliLiter(s) IV Push every 1 hour PRN Pre/post blood products, medications, blood draw, and to maintain line patency      - Other: Advanced directives     DNR     No documented MOLST form found on patient window     No documented HCP form found on patient window     No Living will / POA / Advance directives found on [place] / patient window     No documented GOC notes on [facility]    - Other: Coordination/Plan of care     __1__ admissions in 1 year     Current admission LOS: _6__ days     LACE score: __13__ ADVANCE ILLNESS PATIENT.     Patient NOT previously seen by palliative medicine consult service.      Consult request for: " hx parkinsons, DNR, with acute decompensation 2/2 cholangitis requiring emergent intubation and pressors. per Lonny (HCP) no quality of life (dependent of ADLs), which per living will patient does not want life sustaining measures   "    Full consult to follow within 24h.

## 2022-08-11 NOTE — PROGRESS NOTE ADULT - ASSESSMENT
81 yo M, PMHx of Parkinson Disease, HTN, DM, HLD   Presented when HHA called EMS for pale appearance / hypotension , being treated in H for ?PNA, s/p Code Stroke in ED  Initially GI was consulted as CT showed hepatic flexure colitis, however likely contiguous inflammation iso acute judy  Now reconsulted advanced GI for acute worsening of LTs and clinical decompensation.   Overnight patient developed septic shock with fever, tachy, hypotension, now on pressors in ICU. Being planned for urgent IR guided PTC    # Septic shock  # acute Cholecystitis  #?PNA  #?Reactive Colitis  Pattern of elevated LTs is suggestive of shock liver and sepsis (likely related to acute judy)  - no evidence of cholangitis (3 mm CBD with no evidence of cbd obstruction on imaging)  - plan for IR- PTC noted  - continue care per surgery and ICU teams  - outpt colonoscopy upon discharge as suggested by general GI    Recommendations discussed with primary team  Plan discussed with adv GI attending    Gal Ritter MD  PGY-6 GI fellow  Pager: [phone]   83 yo M, PMHx of Parkinson Disease, HTN, DM, HLD   Presented when HHA called EMS for pale appearance / hypotension , being treated in H for ?PNA, s/p Code Stroke in ED  Initially GI was consulted as CT showed hepatic flexure colitis, however likely contiguous inflammation iso acute judy  Now reconsulted advanced GI for acute worsening of LTs and clinical decompensation.   Overnight patient developed septic shock with fever, tachy, hypotension, now on pressors in ICU. Being planned for urgent IR guided PTC    # Septic shock  # acute Cholecystitis  #?PNA  #?Reactive Colitis  Pattern of elevated LTs is suggestive of shock liver and sepsis (likely related to acute judy)  - no evidence of cholangitis (3 mm CBD with no evidence of cbd obstruction on imaging)  - plan for IR- PTC on hold as pt made comfort care  - continue care per surgery and ICU teams  - will sign off. call back PRN    Recommendations discussed with primary team  Plan discussed with adv GI attending    Gal Ritter MD  PGY-6 GI fellow  Pager: [phone]

## 2022-08-11 NOTE — CONSULT NOTE ADULT - PROBLEM SELECTOR RECOMMENDATION 9
Patient is currently intubated and sedated. Per discussion with patient HCP, he would like him extubated as soon as possible and understands that he is going to die once he is extubated.   recommendations in anticipation of compassionate extubation.  Pre-Extubation  -stop tube feeds to prevent aspiration; pull OGT when extubated  -pre-medicate with Robinul 0.4mg IV 30min prior to extubation  -premedicate with Dilaudid 1mg IV 30min prior to extubation  -discontinue pressors/inotropes immediately after extubation    Post-Extubation  -Dilaudid 1mg IV q1hr PRN Dyspnea or RR>25  -Ativan 1mg IV q1hr PRN Anxiety/Agitation  -Haldol 1mg IV q1hr PRN Terminal Agitation  -Robinul 0.4mg q6hr PRN for Excessive Secretions Patient is currently intubated and sedated. Per discussion with patient HCP, he would like him extubated as soon as possible and understands that he is going to die once he is extubated.   recommendations in anticipation of compassionate extubation.  Pre-Extubation  -stop tube feeds to prevent aspiration; pull OGT when extubated  -pre-medicate with Robinul 0.4mg IV 30min prior to extubation  -Initiate Dilaudid gtt 0.5mg/hr and premedicate with Dilaudid 1mg IV 30min prior to extubation  -discontinue pressors/inotropes immediately after extubation    Post-Extubation  -Dilaudid 1mg IV q1hr PRN Dyspnea or RR>25  -Ativan 1mg IV q1hr PRN Anxiety/Agitation  -Haldol 1mg IV q1hr PRN Terminal Agitation  -Robinul 0.4mg q6hr PRN for Excessive Secretions Patient is currently intubated and sedated. Per discussion with patient HCP, he would like him extubated as soon as possible and understands that he is going to die once he is extubated.   recommendations in anticipation of compassionate extubation.  Pre-Extubation  -stop tube feeds to prevent aspiration; pull OGT when extubated  -pre-medicate with Robinul 0.4mg IV 30min prior to extubation  -Initiate Dilaudid gtt 0.5mg/hr and premedicate with Dilaudid 1mg IV 30min prior to extubation  -discontinue pressors immediately after extubation    Post-Extubation  -Dilaudid 1mg IV q1hr PRN Dyspnea or RR>25  -Ativan 1mg IV q1hr PRN Anxiety/Agitation  -Haldol 1mg IV q1hr PRN Terminal Agitation  -Robinul 0.4mg q6hr PRN for Excessive Secretions

## 2022-08-11 NOTE — CONSULT NOTE ADULT - PROBLEM SELECTOR RECOMMENDATION 4
16  minutes was spent discussing advance care planning with the patients HCP over the phone. 16 minutes was spent discussing advance care planning with the patients HCP over the phone.

## 2022-08-11 NOTE — PROGRESS NOTE ADULT - ASSESSMENT
82M PMH Parkinson's, HTN, HLD, DM BIBEMS from home after HHA noted he was pale and weak, admitted for sepsis w/ suspected CAP (now unlikely source) and stroke code called but negative w/ moderate R ICA stenosis started on ASA/plavix and continued sepsis w/u. General surgery consulted for finding of hepatic flexure colitis on CT scan along w/ abdominal pain on exam. CT scan equivocal for colitis vs malignancy w/ reactive gallbladder inflammation vs cholecystitis w/ reactive colitis and RUQ US showing GBWT and PCCF suggestive of possible cholecystitis. HIDA scan performed yesterday showed nonvisualization of the gallbladder. GI consult determined likely acute judy primary and is deferring inpatient Cscope due to possibility of R sided diverticulitis, and recommending interval follow up. While primary colitis/malignancy is not entirely ruled out, preponderance of evidence is to acute cholecystitis as primary insult. However, pt is a poor surgical candidate secondary to  medical comorbidities. Overnight pt became febrile to 104, tachycardic to 140s, respiratory distress and hypoxia requiring intubation. Tbili noted to be elevated to 1.8 from 0.4. ALP/AST//1109/140. Findings concerning for cholangitis.    Transfer to surgical service, SICU  IR for perc judy and PTC  GI for consideration of ERCP

## 2022-08-11 NOTE — CONSULT NOTE ADULT - SUBJECTIVE AND OBJECTIVE BOX
ALPHONSE NORIEGA          MRN-[id number]            (1940)    HPI:  81yo M with h/o Parkinsons, HTN, DM, HLD presents after the home health aid called EMS. The HHA at bedside states he called EMS because the patient looked extremely pale and had a blood pressure of 77/60. Patient states that the blood pressure did not get better after he took all his medications. HHA states patient was not able to walk in the past few days and it was due to worsening weakness. His appetite was unchanged. He complains of recent coughing fits over the past few months and now he states that he has a bunch of mucus in his mouth but otherwise feels totally fine. Per HHA patient is now at baseline and the only difference is he is breathing a little heavier. Patients last bowel movement was 4 days ago which he states is standard for him and has no other complaints at this time.    In the ED:  Vitals: Temp 98.3 HR 94 /78 O2 Sat 96%  Labs: WBC 18 Hg 13.9 Plt 168 PTT 32 PT 16 INR 1.39 Lactate 3.3, Na 139 K 3.6 Cl 103 Bicarb 26 AG 10 Cr 1.75 Glucose 313Calcium 8.9   Imaging:  Intervention: Stroke Code called - deemed negative. Azithromycin 500, Ceftriaxone 1g, heparin 5000 q8, 1 L NS   (05 Aug 2022 03:20)      PAST MEDICAL & SURGICAL HISTORY:  DM2 (diabetes mellitus, type 2)  since 2017    Parkinsons    Hypertension    Hyperlipidemia    Depression    Melanoma      Depression    Tinnitus of left ear    Vertigo    No significant past surgical history    FAMILY HISTORY:  Family history of stroke     Reviewed and found non contributory in mother or father    SOCIAL HISTORY: patient lives alone with 24 hour hha  former smoker. Obtained from H&P    PSYCHOSOCIAL ASSESSMENT:  Rastafarian/Spiritual practice: unkwown  Role of organized Mosque [ ] important [ ] some [x ] unable to assess dt pt mentation   Coping: [ ] well [ ] with difficulty [ ] poor coping [X ] unable to assess dt pt mentation  Support system: [ ] strong [ ] adequate [ ] inadequate [x] Unable to obtain    ROS:    Unable to attain due to:  intubated and sedated    Dyspnea (Phani 0-10):    0                    N/V (Y/N):      N                        Secretions (Y/N) :   N             Agitation(Y/N): N   Pain (Y/N):     N  -Provocation/Palliation: n/a   -Quality/Quantity: n/a   -Radiating: n/a   -Severity: n/a   -Timing/Frequency: n/a   -Impact on ADLs: n/a     General:  unable to obtain   HEENT:    unable to obtain   Neck:  unable to obtain   CVS: unable to obtain   Resp:  unable to obtain   GI: unable to obtain   :  unable to obtain   Musc:  unable to obtain   Neuro: unable to obtain   Psych:  unable to obtain   Skin:  unable to obtain   Lymph:  unable to obtain     Allergies    No Known Allergies    Intolerances      Opiate Naive (Y/N): Y  -iStop reviewed (Y/N): Y (Ref#:    [id number])   2022	lorazepam 1 mg tablet	120	30	Alexandre Roman MD	[id number]	Medicare	[facility] #[id number]  2022	lorazepam 1 mg tablet	120	30	Alexandre Roman MD	[id number]	Medicare	[facility] #[id number]  2022	lorazepam 1 mg tablet	120	30	Alexandre Roman MD	[id number]	Medicare	[facility] #[id number]  2022	lorazepam 1 mg tablet	120	30	Alexandre Roman MD	[id number]	Medicare	[facility] #[id number]  2022	lorazepam 1 mg tablet	120	30	Alexandre Roman MD	[id number]	[facility]icare	[facility] #[id number]  2022	03/10/2022	lorazepam 1 mg tablet	90	30	Alexandre Roman MD	[id number]	Medicare	[facility] #[id number]  2022	loreev xr 3 mg capsule	3	3	Alexandre Roman MD	[id number]	[facility] #[id number]  2022	lorazepam 1 mg tablet	90	30	Alexandre Roman MD	[id number]	Medicare	[facility] #[id number]    Medications:      MEDICATIONS  (STANDING):  atorvastatin 40 milliGRAM(s) Oral at bedtime  chlorhexidine 0.12% Liquid 15 milliLiter(s) Oral Mucosa every 12 hours  chlorhexidine 4% Liquid 1 Application(s) Topical <User Schedule>  dextrose 5%. 1000 milliLiter(s) (50 mL/Hr) IV Continuous <Continuous>  dextrose 5%. 1000 milliLiter(s) (100 mL/Hr) IV Continuous <Continuous>  dextrose 50% Injectable 25 Gram(s) IV Push once  dextrose 50% Injectable 12.5 Gram(s) IV Push once  dextrose 50% Injectable 25 Gram(s) IV Push once  glucagon  Injectable 1 milliGRAM(s) IntraMuscular once  insulin lispro (ADMELOG) corrective regimen sliding scale   SubCutaneous Before meals and at bedtime  norepinephrine Infusion 0.05 MICROgram(s)/kG/Min (2.84 mL/Hr) IV Continuous <Continuous>  pantoprazole  Injectable 40 milliGRAM(s) IV Push every 24 hours  propofol Infusion 10 MICROgram(s)/kG/Min (3.63 mL/Hr) IV Continuous <Continuous>    MEDICATIONS  (PRN):  dextrose Oral Gel 15 Gram(s) Oral once PRN Blood Glucose LESS THAN 70 milliGRAM(s)/deciliter  sodium chloride 0.9% lock flush 10 milliLiter(s) IV Push every 1 hour PRN Pre/post blood products, medications, blood draw, and to maintain line patency    Labs:    CBC:                        11.8   17.95 )-----------( 391      ( 11 Aug 2022 05:50 )             35.6     CMP:        142  |  106  |  26<H>  ----------------------------<  286<H>  3.3<L>   |  24  |  1.00    Ca    8.0<L>      11 Aug 2022 05:50  Phos  3.8       Mg     1.6         TPro  5.3<L>  /  Alb  2.4<L>  /  TBili  2.4<H>  /  DBili  x   /  AST  792<H>  /  ALT  107<H>  /  AlkPhos  814<H>      PT/INR - ( 11 Aug 2022 05:50 )   PT: 19.4 sec;   INR: 1.62          PTT - ( 11 Aug 2022 05:50 )  PTT:30.2 sec      Imaging:  < from: MR Head No Cont (22 @ 12:34) >  ACC: [id number] EXAM:  MR BRAIN                          PROCEDURE DATE:  2022    IMPRESSION:    Negative for recent infarct.      < end of copied text >    < from: CT Abdomen and Pelvis w/ IV Cont (22 @ 17:29) >  ACC: [id number] EXAM:  CT ABDOMEN AND PELVIS IC                          PROCEDURE DATE:  2022          INTERPRETATION:  Attending over read. Agree with the below report with   following modifications. Patient's arms down by his side projecting  artifacts into the abdomen. Aortic valve calcification. Dense mitral   valve annular calcification. Small bilateral pleural effusions.   Compressive atelectasis bilateral lower lobes. The gallbladder is   distended and thick-walled with pericholecystic fat infiltration. No   obvious radiopaque stones within the gallbladder. Probable   hypervascularity seen in segment five and segment 4B of liver around the   gallbladder. In addition there is quite a long segment of wall thickening   with pericolic fat infiltration involving the hepatic flexure of colon.   Prime consideration would be acute cholecystitis with contiguous reactive   wall thickening of the hepatic flexure of colon rather than acute colonic   diverticulitis involving the hepaticflecture with contiguous involvement   of the gallbladder. Normal appendix. Colonic diverticulosis. Distended   rectum with transverse diameter measuring 9.2 cm. Rectal wall thickening.   Rectum and sigmoid are distended and packed with fecal material.   Presacral edema. Enlarged prostate measuring 7.8 x 7.3 x 5.0 cm.   Intravesical protrusion of central gland of prostate. Small amount of   ascites. Patchy cortical nephrographic defects of the left kidney   considerations include acute pyelonephritis versus renal infarcts. Renal   veins intact. Mild splenomegaly. Mild fluid dilatation of the distal   esophagus. Small bilateral fat-containing indirect inguinal hernias.    MD AnatolyIMPRESSION:  1. Marked wall thickening of the ascending colon and hepatic flexure   concerning  for underlying significant colitis. Some asymmetric wall thickening along   the  hepatic flexure laterally measuring 3.5 x 1.8 cm (series 3, image 67)   which  could represent intramural phlegmon/developing abscess.  This is amenable   to  follow-up CT imaging or colonoscopy in 1 month to confirm resolution and  exclude an underlyingcolonic lesion in the region.  2. Distended gallbladder with slight wall thickening and mild adjacent fat  stranding. This is presumably reactive secondary to adjacent colitis   rather  than acute cholecystitis though clinical correlation should be made with  bilirubin levels to exclude acute cholecystitis.  3. Thickening of the 2/3 portions of the duodenum which may be reactive   though  should be correlated clinically to exclude duodenitis.  4. Moderate stool in the rectosigmoid colon concerning for constipation.  5. Moderate atelectatic changes lung bases with small right pleural   effusion.  6. Moderately enlarged prostate.    < end of copied text >      PEx:  T(C): 37.2 (22 @ 09:09), Max: 40 (22 @ 00:20)  HR: 77 (22 @ 10:00) (77 - 154)  BP: 122/61 (22 @ 10:00) (60/35 - 195/125)  RR: 14 (22 @ 10:00) (12 - 28)  SpO2: 98% (22 @ 10:00) (90% - 99%)  Wt(kg):  60.5kG  Daily     Daily   CAPILLARY BLOOD GLUCOSE      POCT Blood Glucose.: 268 mg/dL (11 Aug 2022 07:30)    I&O's Summary    10 Aug 2022 07:  -  11 Aug 2022 07:00  --------------------------------------------------------  IN: 1554 mL / OUT: 10 mL / NET: 1544 mL    11 Aug 2022 07:  -  11 Aug 2022 10:39  --------------------------------------------------------  IN: 190 mL / OUT: 35 mL / NET: 155 mL    GENERAL:  [ ]Alert  [ ]Oriented x   [ ]Lethargic  [ ]Cachexia  [x ]Unarousable  [ ]Verbal  [ x]Non-Verbal   Behavioral:   [ ] Anxiety  [ ] Delirium [ ] Agitation [ ] Other  HEENT:  [ ]Normal   [ ]Dry mouth   [x ]ET Tube  [ ]Oral lesions  PULMONARY:   [x ]Clear  [ ]Tachypnea  [ ]Audible excessive secretions   [ ]Rhonchi        [ ]Right [ ]Left [ ]Bilateral  [ ]Crackles        [ ]Right [ ]Left [ ]Bilateral  [ ]Wheezing     [ ]Right [ ]Left [ ]Bilateral  CARDIOVASCULAR:    [ x]Regular [ ]Irregular [ ]Tachy  [ ]Wu [ ]Murmur [ ]Other  GASTROINTESTINAL:  [x ]Soft  [ ]Distended   [ ]+BS  [x ]Non tender [ ]Tender  [ ]PEG [ ]OGT/ NGT  Last BM:   GENITOURINARY:  [ ]Normal [ ] Incontinent   [ ]Oliguria/Anuria   [x]Krishnamurthy  MUSCULOSKELETAL:   [ ]Normal   [ ]Weakness  [ x]Bed/Wheelchair bound [ ]Edema  NEUROLOGIC:   [ ]No focal deficits  [ ] Cognitive impairment  [ ] Dysphagia [ ]Dysarthria [ ] Paresis [ x]Other - sedated   SKIN:   [x ]Normal   [ ]Pressure ulcer(s)  [ ]Rash      Preadmit Karnofsky: 60 %           Current Karnofsky:     10%  Cachexia (Y/N): N  BMI: 20.3kg/m2    Advanced Directives:     DNR    DECISION MAKER: Patient is not able to make decisions for himself at this time.  LEGAL SURROGATE: HCP: Lonny Andersen [phone]    GOALS OF CARE DISCUSSION       Palliative care info/counseling provided	           Family meeting       Advanced Directives addressed please see Advance Care Planning Note	           See previous Palliative Medicine Note       Documentation of GOC: 	DNR          REFERRALS	       non ALPHONSE NORIEGA          MRN-[id number]            (1940)    HPI:  81yo M with h/o Parkinsons, HTN, DM, HLD presents after the home health aid called EMS. The HHA at bedside states he called EMS because the patient looked extremely pale and had a blood pressure of 77/60. Patient states that the blood pressure did not get better after he took all his medications. HHA states patient was not able to walk in the past few days and it was due to worsening weakness. His appetite was unchanged. He complains of recent coughing fits over the past few months and now he states that he has a bunch of mucus in his mouth but otherwise feels totally fine. Per HHA patient is now at baseline and the only difference is he is breathing a little heavier. Patients last bowel movement was 4 days ago which he states is standard for him and has no other complaints at this time.    In the ED:  Vitals: Temp 98.3 HR 94 /78 O2 Sat 96%  Labs: WBC 18 Hg 13.9 Plt 168 PTT 32 PT 16 INR 1.39 Lactate 3.3, Na 139 K 3.6 Cl 103 Bicarb 26 AG 10 Cr 1.75 Glucose 313Calcium 8.9   Imaging:  Intervention: Stroke Code called - deemed negative. Azithromycin 500, Ceftriaxone 1g, heparin 5000 q8, 1 L NS   (05 Aug 2022 03:20)      PAST MEDICAL & SURGICAL HISTORY:  DM2 (diabetes mellitus, type 2)  since 2017    Parkinsons    Hypertension    Hyperlipidemia    Depression    Melanoma      Depression    Tinnitus of left ear    Vertigo    No significant past surgical history    FAMILY HISTORY:  Family history of stroke    Reviewed and found non contributory in mother or father    SOCIAL HISTORY: patient lives alone with 24 hour hha  former smoker. Obtained from H&P    PSYCHOSOCIAL ASSESSMENT:  Gnosticism/Spiritual practice: unkwown  Role of organized Orthodox [ ] important [ ] some [x ] unable to assess dt pt mentation   Coping: [ ] well [ ] with difficulty [ ] poor coping [X ] unable to assess dt pt mentation  Support system: [ ] strong [ ] adequate [ ] inadequate [x] Unable to obtain    ROS:    Unable to attain due to:  intubated and sedated    Dyspnea (Phani 0-10):    0                    N/V (Y/N):      N                        Secretions (Y/N) :   N             Agitation(Y/N): N   Pain (Y/N):     N  -Provocation/Palliation: n/a   -Quality/Quantity: n/a   -Radiating: n/a   -Severity: n/a   -Timing/Frequency: n/a   -Impact on ADLs: n/a     General:  unable to obtain   HEENT:    unable to obtain   Neck:  unable to obtain   CVS: unable to obtain   Resp:  unable to obtain   GI: unable to obtain   :  unable to obtain   Musc:  unable to obtain   Neuro: unable to obtain   Psych:  unable to obtain   Skin:  unable to obtain   Lymph:  unable to obtain     Allergies    No Known Allergies    Intolerances      Opiate Naive (Y/N): Y  -iStop reviewed (Y/N): Y (Ref#:    [id number])   2022	lorazepam 1 mg tablet	120	30	Alexandre Roman MD	[id number]	Medicare	[facility] #[id number]  2022	lorazepam 1 mg tablet	120	30	Alexandre Roman MD	[id number]	Medicare	[facility] #[id number]  2022	lorazepam 1 mg tablet	120	30	Alexandre Roman MD	[id number]	Medicare	[facility] #[id number]  2022	lorazepam 1 mg tablet	120	30	Alexandre Roman MD	[id number]	Medicare	[facility] #[id number]  2022	lorazepam 1 mg tablet	120	30	Alexandre Roman MD	[id number]	Medicare	[facility] #[id number]  2022	03/10/2022	lorazepam 1 mg tablet	90	30	Alexandre Roman MD	[id number]	Medicare	[facility] #[id number]  2022	loreev xr 3 mg capsule	3	3	Alexandre Roman MD	[id number]	[facility] #[id number]  2022	lorazepam 1 mg tablet	90	30	Alexandre Roman MD	[id number]	Medicare	[facility] #[id number]    Medications:      MEDICATIONS  (STANDING):  atorvastatin 40 milliGRAM(s) Oral at bedtime  chlorhexidine 0.12% Liquid 15 milliLiter(s) Oral Mucosa every 12 hours  chlorhexidine 4% Liquid 1 Application(s) Topical <User Schedule>  dextrose 5%. 1000 milliLiter(s) (50 mL/Hr) IV Continuous <Continuous>  dextrose 5%. 1000 milliLiter(s) (100 mL/Hr) IV Continuous <Continuous>  dextrose 50% Injectable 25 Gram(s) IV Push once  dextrose 50% Injectable 12.5 Gram(s) IV Push once  dextrose 50% Injectable 25 Gram(s) IV Push once  glucagon  Injectable 1 milliGRAM(s) IntraMuscular once  insulin lispro (ADMELOG) corrective regimen sliding scale   SubCutaneous Before meals and at bedtime  norepinephrine Infusion 0.05 MICROgram(s)/kG/Min (2.84 mL/Hr) IV Continuous <Continuous>  pantoprazole  Injectable 40 milliGRAM(s) IV Push every 24 hours  propofol Infusion 10 MICROgram(s)/kG/Min (3.63 mL/Hr) IV Continuous <Continuous>    MEDICATIONS  (PRN):  dextrose Oral Gel 15 Gram(s) Oral once PRN Blood Glucose LESS THAN 70 milliGRAM(s)/deciliter  sodium chloride 0.9% lock flush 10 milliLiter(s) IV Push every 1 hour PRN Pre/post blood products, medications, blood draw, and to maintain line patency    Labs:    CBC:                        11.8   17.95 )-----------( 391      ( 11 Aug 2022 05:50 )             35.6     CMP:        142  |  106  |  26<H>  ----------------------------<  286<H>  3.3<L>   |  24  |  1.00    Ca    8.0<L>      11 Aug 2022 05:50  Phos  3.8       Mg     1.6         TPro  5.3<L>  /  Alb  2.4<L>  /  TBili  2.4<H>  /  DBili  x   /  AST  792<H>  /  ALT  107<H>  /  AlkPhos  814<H>      PT/INR - ( 11 Aug 2022 05:50 )   PT: 19.4 sec;   INR: 1.62          PTT - ( 11 Aug 2022 05:50 )  PTT:30.2 sec      Imaging:  < from: MR Head No Cont (22 @ 12:34) >  ACC: [id number] EXAM:  MR BRAIN                          PROCEDURE DATE:  2022    IMPRESSION:    Negative for recent infarct.      < end of copied text >    < from: CT Abdomen and Pelvis w/ IV Cont (22 @ 17:29) >  ACC: [id number] EXAM:  CT ABDOMEN AND PELVIS IC                          PROCEDURE DATE:  2022          INTERPRETATION:  Attending over read. Agree with the below report with   following modifications. Patient's arms down by his side projecting  artifacts into the abdomen. Aortic valve calcification. Dense mitral   valve annular calcification. Small bilateral pleural effusions.   Compressive atelectasis bilateral lower lobes. The gallbladder is   distended and thick-walled with pericholecystic fat infiltration. No   obvious radiopaque stones within the gallbladder. Probable   hypervascularity seen in segment five and segment 4B of liver around the   gallbladder. In addition there is quite a long segment of wall thickening   with pericolic fat infiltration involving the hepatic flexure of colon.   Prime consideration would be acute cholecystitis with contiguous reactive   wall thickening of the hepatic flexure of colon rather than acute colonic   diverticulitis involving the hepaticflecture with contiguous involvement   of the gallbladder. Normal appendix. Colonic diverticulosis. Distended   rectum with transverse diameter measuring 9.2 cm. Rectal wall thickening.   Rectum and sigmoid are distended and packed with fecal material.   Presacral edema. Enlarged prostate measuring 7.8 x 7.3 x 5.0 cm.   Intravesical protrusion of central gland of prostate. Small amount of   ascites. Patchy cortical nephrographic defects of the left kidney   considerations include acute pyelonephritis versus renal infarcts. Renal   veins intact. Mild splenomegaly. Mild fluid dilatation of the distal   esophagus. Small bilateral fat-containing indirect inguinal hernias.    MD AnatolyIMPRESSION:  1. Marked wall thickening of the ascending colon and hepatic flexure   concerning  for underlying significant colitis. Some asymmetric wall thickening along   the  hepatic flexure laterally measuring 3.5 x 1.8 cm (series 3, image 67)   which  could represent intramural phlegmon/developing abscess.  This is amenable   to  follow-up CT imaging or colonoscopy in 1 month to confirm resolution and  exclude an underlyingcolonic lesion in the region.  2. Distended gallbladder with slight wall thickening and mild adjacent fat  stranding. This is presumably reactive secondary to adjacent colitis   rather  than acute cholecystitis though clinical correlation should be made with  bilirubin levels to exclude acute cholecystitis.  3. Thickening of the 2/3 portions of the duodenum which may be reactive   though  should be correlated clinically to exclude duodenitis.  4. Moderate stool in the rectosigmoid colon concerning for constipation.  5. Moderate atelectatic changes lung bases with small right pleural   effusion.  6. Moderately enlarged prostate.    < end of copied text >      PEx:  T(C): 37.2 (22 @ 09:09), Max: 40 (22 @ 00:20)  HR: 77 (22 @ 10:00) (77 - 154)  BP: 122/61 (22 @ 10:00) (60/35 - 195/125)  RR: 14 (22 @ 10:00) (12 - 28)  SpO2: 98% (22 @ 10:00) (90% - 99%)  Wt(kg):  60.5kG  Daily     Daily   CAPILLARY BLOOD GLUCOSE      POCT Blood Glucose.: 268 mg/dL (11 Aug 2022 07:30)    I&O's Summary    10 Aug 2022 07:  -  11 Aug 2022 07:00  --------------------------------------------------------  IN: 1554 mL / OUT: 10 mL / NET: 1544 mL    11 Aug 2022 07:  -  11 Aug 2022 10:39  --------------------------------------------------------  IN: 190 mL / OUT: 35 mL / NET: 155 mL    GENERAL:  [ ]Alert  [ ]Oriented x   [ ]Lethargic  [ ]Cachexia  [x ]Unarousable  [ ]Verbal  [ x]Non-Verbal   Behavioral:   [ ] Anxiety  [ ] Delirium [ ] Agitation [ ] Other  HEENT:  [ ]Normal   [ ]Dry mouth   [x ]ET Tube  [ ]Oral lesions  PULMONARY:   [x ]Clear  [ ]Tachypnea  [ ]Audible excessive secretions   [ ]Rhonchi        [ ]Right [ ]Left [ ]Bilateral  [ ]Crackles        [ ]Right [ ]Left [ ]Bilateral  [ ]Wheezing     [ ]Right [ ]Left [ ]Bilateral  CARDIOVASCULAR:    [ x]Regular [ ]Irregular [ ]Tachy  [ ]Wu [ ]Murmur [ ]Other  GASTROINTESTINAL:  [x ]Soft  [ ]Distended   [ ]+BS  [x ]Non tender [ ]Tender  [ ]PEG [ ]OGT/ NGT  Last BM:   GENITOURINARY:  [ ]Normal [ ] Incontinent   [ ]Oliguria/Anuria   [x]Krishnamurthy  MUSCULOSKELETAL:   [ ]Normal   [ ]Weakness  [ x]Bed/Wheelchair bound [ ]Edema  NEUROLOGIC:   [ ]No focal deficits  [ ] Cognitive impairment  [ ] Dysphagia [ ]Dysarthria [ ] Paresis [ x]Other - sedated   SKIN:   [x ]Normal   [ ]Pressure ulcer(s)  [ ]Rash      Preadmit Karnofsky: 60 %           Current Karnofsky:     10%  Cachexia (Y/N): N  BMI: 20.3kg/m2    Advanced Directives:     DNR    DECISION MAKER: Patient is not able to make decisions for himself at this time.  LEGAL SURROGATE: HCP: Lonny Andersen [phone]    GOALS OF CARE DISCUSSION       Palliative care info/counseling provided	           Family meeting       Advanced Directives addressed please see Advance Care Planning Note	           See previous Palliative Medicine Note       Documentation of GOC: 	DNR          REFERRALS	       non

## 2022-08-11 NOTE — PROGRESS NOTE ADULT - SUBJECTIVE AND OBJECTIVE BOX
SUBJECTIVE: ON febrile to 104. Tachycardia to 150s. Desatting and resp distress requiring intubation. Levo @ 0.95 this AM.      MEDICATIONS  (STANDING):  atorvastatin 40 milliGRAM(s) Oral at bedtime  chlorhexidine 0.12% Liquid 15 milliLiter(s) Oral Mucosa every 12 hours  chlorhexidine 4% Liquid 1 Application(s) Topical <User Schedule>  dextrose 5%. 1000 milliLiter(s) (50 mL/Hr) IV Continuous <Continuous>  dextrose 5%. 1000 milliLiter(s) (100 mL/Hr) IV Continuous <Continuous>  dextrose 50% Injectable 25 Gram(s) IV Push once  dextrose 50% Injectable 12.5 Gram(s) IV Push once  dextrose 50% Injectable 25 Gram(s) IV Push once  glucagon  Injectable 1 milliGRAM(s) IntraMuscular once  insulin lispro (ADMELOG) corrective regimen sliding scale   SubCutaneous Before meals and at bedtime  norepinephrine Infusion 0.05 MICROgram(s)/kG/Min (2.84 mL/Hr) IV Continuous <Continuous>  pantoprazole  Injectable 40 milliGRAM(s) IV Push every 24 hours  propofol Infusion 10 MICROgram(s)/kG/Min (3.63 mL/Hr) IV Continuous <Continuous>    MEDICATIONS  (PRN):  dextrose Oral Gel 15 Gram(s) Oral once PRN Blood Glucose LESS THAN 70 milliGRAM(s)/deciliter  sodium chloride 0.9% lock flush 10 milliLiter(s) IV Push every 1 hour PRN Pre/post blood products, medications, blood draw, and to maintain line patency      Vital Signs Last 24 Hrs  T(C): 37.2 (11 Aug 2022 09:09), Max: 40 (11 Aug 2022 00:20)  T(F): 98.9 (11 Aug 2022 09:09), Max: 104 (11 Aug 2022 00:20)  HR: 77 (11 Aug 2022 10:00) (77 - 154)  BP: 122/61 (11 Aug 2022 10:00) (60/35 - 195/125)  BP(mean): 86 (11 Aug 2022 10:00) (43 - 139)  RR: 14 (11 Aug 2022 10:00) (12 - 28)  SpO2: 98% (11 Aug 2022 10:00) (90% - 99%)    Parameters below as of 11 Aug 2022 10:00  Patient On (Oxygen Delivery Method): ventilator    O2 Concentration (%): 40    Physical Exam:  General: Sedated  Pulmonary: Intubated  Cardiovascular: Sinus tach at time of exam  Abdominal: soft, unable to assess tenderness due to pt condition    I&O's Summary    10 Aug 2022 07:01  -  11 Aug 2022 07:00  --------------------------------------------------------  IN: 1554 mL / OUT: 10 mL / NET: 1544 mL    11 Aug 2022 07:01  -  11 Aug 2022 10:54  --------------------------------------------------------  IN: 190 mL / OUT: 35 mL / NET: 155 mL        LABS:                        11.8   17.95 )-----------( 391      ( 11 Aug 2022 05:50 )             35.6     08-11    142  |  106  |  26<H>  ----------------------------<  286<H>  3.3<L>   |  24  |  1.00    Ca    8.0<L>      11 Aug 2022 05:50  Phos  3.8     08-11  Mg     1.6     08-11    TPro  5.3<L>  /  Alb  2.4<L>  /  TBili  2.4<H>  /  DBili  x   /  AST  792<H>  /  ALT  107<H>  /  AlkPhos  814<H>  08-11    PT/INR - ( 11 Aug 2022 05:50 )   PT: 19.4 sec;   INR: 1.62          PTT - ( 11 Aug 2022 05:50 )  PTT:30.2 sec    CAPILLARY BLOOD GLUCOSE      POCT Blood Glucose.: 268 mg/dL (11 Aug 2022 07:30)  POCT Blood Glucose.: 218 mg/dL (10 Aug 2022 22:14)  POCT Blood Glucose.: 187 mg/dL (10 Aug 2022 17:50)  POCT Blood Glucose.: 179 mg/dL (10 Aug 2022 12:28)    LIVER FUNCTIONS - ( 11 Aug 2022 05:50 )  Alb: 2.4 g/dL / Pro: 5.3 g/dL / ALK PHOS: 814 U/L / ALT: 107 U/L / AST: 792 U/L / GGT: x             RADIOLOGY & ADDITIONAL STUDIES:

## 2022-08-11 NOTE — CONSULT NOTE ADULT - PARTICIPANTS
Janeen Stanton is a 34 year old female presenting to  Clinic for IV therapy.     Regimen: IVIG    Dr. Camarillo is supervising provider today.    Nursing Assessment: A focused nursing assessment  was performed and the patient reports the following: Fever: NO  Chills: NO  Cough: NO  Shortness of Breath: NO  Pain: NO  Nasal congestion: patient reports nasal congestion since last night.     Pre-Treatment: - Patient has valid pre-authorization  - VS completed  - Premed orders are verified prior to administration  - Patient is identified by first & last name, Date of birth that has been verified with the patient chairside.    Treatment: Refer to [facility] and MAR for line assessment and medication administration, Blood return confirmed before, during and after treatment administered, Infusion pump used for non-vesicant drugs and Extravasation/Infiltration: None     Post Treatment: Treatment tolerated well; no adverse reaction    Education: No new instructions needed    Next appointment scheduled:   Future Appointments   Date Time Provider Department Center   12/27/2019  8:30 AM [facility] TREATMENT CHAIR [address]   1/29/2020  3:45 PM Twin Ramsey MD [facility] [address] [place] [address]   3/3/2020  1:30 PM MD SHANE [facility]   11/3/2020  4:20 PM AARTI Lima     Patient instructed to call the office with any questions or concerns.    Patient Discharged: patient discharged to home per self, ambulatory      
Family/Staff

## 2022-08-12 NOTE — PROGRESS NOTE ADULT - ASSESSMENT
82M PMH Parkinson's, HTN, HLD, DM BIBEMS from home after HHA noted he was pale and weak, admitted for sepsis w/ suspected CAP (now unlikely source) and stroke code called but negative w/ moderate R ICA stenosis started on ASA/plavix and continued sepsis w/u. General surgery consulted for finding of hepatic flexure colitis on CT scan along w/ abdominal pain on exam. CT scan equivocal for colitis vs malignancy w/ reactive gallbladder inflammation vs cholecystitis w/ reactive colitis and RUQ US showing GBWT and PCCF suggestive of possible cholecystitis. HIDA scan performed yesterday showed nonvisualization of the gallbladder. GI consult determined likely acute judy primary and is deferring inpatient Cscope due to possibility of R sided diverticulitis, and recommending interval follow up. While primary colitis/malignancy is not entirely ruled out, preponderance of evidence is to acute cholecystitis as primary insult. However, pt is a poor surgical candidate secondary to  medical comorbidities. Overnight pt became febrile to 104, tachycardic to 140s, respiratory distress and hypoxia requiring intubation. Tbili noted to be elevated to 1.8 from 0.4. ALP/AST//1109/140. Findings concerning for cholangitis. After discussion w/ family and HCP, decision was made for DNR/DNI status and comfort care measures.    Comfort Care

## 2022-08-12 NOTE — PROGRESS NOTE ADULT - PROBLEM SELECTOR PROBLEM 3
Functional quadriplegia
Pneumonia, aspiration

## 2022-08-12 NOTE — PROGRESS NOTE ADULT - PROBLEM SELECTOR PROBLEM 2
Metabolic encephalopathy
Increased oropharyngeal secretions
Metabolic encephalopathy
Metabolic encephalopathy

## 2022-08-12 NOTE — PROGRESS NOTE ADULT - PROBLEM SELECTOR PLAN 1
Patient extubated yesterday. Remains on Dilaudid 0.5mg/hr drip with Dilaudid 1mg q1h IV PRN. Had required 4 PRNs in 24 hours. Continued currently drip rate, if requires 4  or more PRNs. Can increase drip to 0.8mg/hr.  -Haldol 1mg IV q1hr PRN Terminal Agitation

## 2022-08-12 NOTE — PROGRESS NOTE ADULT - SUBJECTIVE AND OBJECTIVE BOX
SUBJECTIVE: Pt w/ septic shock. Extubated and on room air. Comfort measures at this time per family.      MEDICATIONS  (STANDING):  glycopyrrolate Injectable 0.4 milliGRAM(s) IV Push every 4 hours  HYDROmorphone Infusion 0.5 mG/Hr (2.5 mL/Hr) IV Continuous <Continuous>    MEDICATIONS  (PRN):  haloperidol    Injectable 1 milliGRAM(s) IV Push every 1 hour PRN terminal agitation  HYDROmorphone  Injectable 1 milliGRAM(s) IV Push every 1 hour PRN dyspnea, RR>25  LORazepam   Injectable 1 milliGRAM(s) IV Push every 1 hour PRN anxiety/agitation      Vital Signs Last 24 Hrs  T(C): 36.8 (12 Aug 2022 12:00), Max: 37.7 (12 Aug 2022 10:01)  T(F): 98.3 (12 Aug 2022 12:00), Max: 99.9 (12 Aug 2022 10:01)  HR: 124 (12 Aug 2022 12:00) (75 - 129)  BP: 110/69 (12 Aug 2022 12:00) (85/59 - 144/72)  BP(mean): 102 (12 Aug 2022 10:08) (67 - 102)  RR: 26 (12 Aug 2022 12:00) (8 - 39)  SpO2: 86% (12 Aug 2022 12:00) (74% - 91%)    Parameters below as of 12 Aug 2022 12:00  Patient On (Oxygen Delivery Method): room air        Physical Exam:  General: NAD, resting in bed  Pulmonary: Nonlabored breathing, no respiratory distress  Cardiovascular: tachycardic  Abdominal: soft, limited due to patient condition    I&O's Summary    11 Aug 2022 07:01  -  12 Aug 2022 07:00  --------------------------------------------------------  IN: 330.2 mL / OUT: 533 mL / NET: -202.8 mL    12 Aug 2022 07:01  -  12 Aug 2022 17:34  --------------------------------------------------------  IN: 10 mL / OUT: 310 mL / NET: -300 mL        LABS:                        11.8   17.95 )-----------( 391      ( 11 Aug 2022 05:50 )             35.6     08-11    142  |  106  |  26<H>  ----------------------------<  286<H>  3.3<L>   |  24  |  1.00    Ca    8.0<L>      11 Aug 2022 05:50  Phos  3.8     08-11  Mg     1.6     08-11    TPro  5.3<L>  /  Alb  2.4<L>  /  TBili  2.4<H>  /  DBili  x   /  AST  792<H>  /  ALT  107<H>  /  AlkPhos  814<H>  08-11    PT/INR - ( 11 Aug 2022 05:50 )   PT: 19.4 sec;   INR: 1.62          PTT - ( 11 Aug 2022 05:50 )  PTT:30.2 sec    CAPILLARY BLOOD GLUCOSE        LIVER FUNCTIONS - ( 11 Aug 2022 05:50 )  Alb: 2.4 g/dL / Pro: 5.3 g/dL / ALK PHOS: 814 U/L / ALT: 107 U/L / AST: 792 U/L / GGT: x             RADIOLOGY & ADDITIONAL STUDIES:

## 2022-08-12 NOTE — PROGRESS NOTE ADULT - PROBLEM SELECTOR PROBLEM 1
Severe sepsis
Respiratory failure
Severe sepsis
Severe sepsis

## 2022-08-12 NOTE — PROGRESS NOTE ADULT - ASSESSMENT
82 year old man with respiratory failure, increased secretions, functional quadriplegia, Cholangitis and encounter for palliative care.

## 2022-08-12 NOTE — PROGRESS NOTE ADULT - SUBJECTIVE AND OBJECTIVE BOX
ALPHONSE NORIEGA             MRN-[id number]    CC: weakness    HPI:  83yo M with h/o Parkinsons, HTN, DM, HLD presents after the home health aid called EMS. The HHA at bedside states he called EMS because the patient looked extremely pale and had a blood pressure of 77/60. Patient states that the blood pressure did not get better after he took all his medications. HHA states patient was not able to walk in the past few days and it was due to worsening weakness. His appetite was unchanged. He complains of recent coughing fits over the past few months and now he states that he has a bunch of mucus in his mouth but otherwise feels totally fine. Per HHA patient is now at baseline and the only difference is he is breathing a little heavier. Patients last bowel movement was 4 days ago which he states is standard for him and has no other complaints at this time.    In the ED:  Vitals: Temp 98.3 HR 94 /78 O2 Sat 96%  Labs: WBC 18 Hg 13.9 Plt 168 PTT 32 PT 16 INR 1.39 Lactate 3.3, Na 139 K 3.6 Cl 103 Bicarb 26 AG 10 Cr 1.75 Glucose 313Calcium 8.9   Imaging:  Intervention: Stroke Code called - deemed negative. Azithromycin 500, Ceftriaxone 1g, heparin 5000 q8, 1 L NS   (05 Aug 2022 03:20)    SUBJECTIVE: Patient extubated yesterday and pressors were discontinued. Remains on Dilaudid 0.5mg/hr drip and received 4 PRNs of Dilaudid 1mg q2h IV PRN    ROS:  DYSPNEA (Phani): 0  NAUS/VOM: N	  SECRETIONS: N  AGITATION: N  Pain (Y/N):     N  -Provocation/Palliation: n/a   -Quality/Quantity: n/a  -Radiating: n/a  -Severity: n/a  -Timing/Frequency: n/a  -Impact on ADLs: n/a    OTHER REVIEW OF SYSTEMS: Unable to obtain   UNABLE TO OBTAIN  due to: No mental status.     PEx:  T(C): 36.8 (08-12-22 @ 12:00), Max: 37.7 (08-12-22 @ 10:01)  HR: 124 (08-12-22 @ 12:00) (74 - 129)  BP: 110/69 (08-12-22 @ 12:00) (59/36 - 144/72)  RR: 26 (08-12-22 @ 12:00) (8 - 39)  SpO2: 86% (08-12-22 @ 12:00) (74% - 93%)  Wt(kg): 60.5kG    GENERAL:  [ ]Alert  [ ]Oriented x   [x ]Lethargic   [ ]Cachexia [ ]Verbal  [ ]Non-Verbal  Behavioral:   [ ] Anxiety  [ ] Delirium [ ] Agitation [x ] Other- calm  HEENT:  [ ]Normal   [x]Dry mouth   [ ]ET Tube  [ ]Oral lesions  PULMONARY:   [ ]Clear  [ ]Tachypnea  [ xAudible excessive secretions   [ ]Rhonchi     [ ]Right [ ]Left [ ]Bilateral  [ ]Crackles        [ ]Right [ ]Left [ ]Bilateral  [ ]Wheezing     [ ]Right [ ]Left [ ]Bilateral  CARDIOVASCULAR:    [ ]Regular [ ]Irregular [ xTachy  [ ]Wu [ ]Murmur [ ]Other  GASTROINTESTINAL:  [x]Soft  [ ]Distended   [ ]+BS  [x]Non tender [ ]Tender  [ ]PEG [ ]OGT/NGT  [] flexiseal with output  Last BM:   GENITOURINARY:  [ ]Normal [ ] Incontinent   [ ]Oliguria/Anuria   [x]Krishnamurthy  MUSCULOSKELETAL:   [ ]Normal   [ ]Weakness  [x]Bed/Wheelchair bound [ ]Edema  NEUROLOGIC:   [ ]No focal deficits  [x] Cognitive impairment  [ ] Dysphagia [ ]Dysarthria [ ] Paresis [  ]Other   SKIN:   [x]Normal   [ ]Pressure ulcer(s)  [ ]Rash       ALLERGIES: No Known Allergies      OPIATE NAÏVE (Y/N): N    MEDICATIONS: REVIEWED  MEDICATIONS  (STANDING):  glycopyrrolate Injectable 0.4 milliGRAM(s) IV Push every 4 hours  HYDROmorphone Infusion 0.5 mG/Hr (2.5 mL/Hr) IV Continuous <Continuous>    MEDICATIONS  (PRN):  haloperidol    Injectable 1 milliGRAM(s) IV Push every 1 hour PRN terminal agitation  HYDROmorphone  Injectable 1 milliGRAM(s) IV Push every 1 hour PRN dyspnea, RR>25  LORazepam   Injectable 1 milliGRAM(s) IV Push every 1 hour PRN anxiety/agitation      LABS: REVIEWED  CBC:                        11.8   17.95 )-----------( 391      ( 11 Aug 2022 05:50 )             35.6     CMP:    08-11    142  |  106  |  26<H>  ----------------------------<  286<H>  3.3<L>   |  24  |  1.00    Ca    8.0<L>      11 Aug 2022 05:50  Phos  3.8     08-11  Mg     1.6     08-11    TPro  5.3<L>  /  Alb  2.4<L>  /  TBili  2.4<H>  /  DBili  x   /  AST  792<H>  /  ALT  107<H>  /  AlkPhos  814<H>  08-11      IMAGING: REVIEWED    ADVANCED DIRECTIVES:            DNR DNI         DECISION MAKER: Patient is not able to make decisions for himself at this time.  LEGAL SURROGATE: HCP: Lonny Andersen [phone]    PSYCHOSOCIAL-SPIRITUAL ASSESSMENT:       Reviewed       Care plan unchanged    GOALS OF CARE DISCUSSION       Palliative care info/counseling provided	           Family meeting       Advanced Directives addressed please see Advance Care Planning Note	           See previous Palliative Medicine Note       Documentation of GOC: DNR/DNI  	      AGENCY CHOICE DISCUSSED:            Hospice    REFERRALS	        Palliative Med        Unit SW/Case Mgmt       Hospice

## 2022-08-12 NOTE — PROGRESS NOTE ADULT - PROBLEM SELECTOR PLAN 5
Patient remains a DNR/DNI. Goal remains symptom directed care.   - Restorationism/Spiritual practice: unknown   - Support system: [x ] strong [ ] adequate [ ] inadequate  - All questions answered, emotional support provided  -  primary team   - Please contact Palliative Medicine 24/7 at [phone] for any acute symptoms or further questions  - Will continue to follow with you.

## 2022-08-12 NOTE — PROGRESS NOTE ADULT - PROBLEM SELECTOR PLAN 4
Patient with known cholangitis, who is no longer getting treated with IV antibiotics. Per patients HCP joey, he does not want to have any procedures performed and prolong the patients suffering. He would like him to remain comfortable and die comfortably. Continue care as per Surgery team.

## 2022-08-13 NOTE — DISCHARGE NOTE FOR THE EXPIRED PATIENT - HOSPITAL COURSE
82M PMH Parkinson's, HTN, HLD, DM BIBEMS from home after HHA noted he was pale and weak, admitted for sepsis. Transferred from medicine to general surgery for findings concerning for cholangitis. After discussion w/ family and HCP, decision was made for DNR/DNI status and comfort care measures.          : Patient admitted to the floor. Being treated for CAP with Azithromycin and CTX. Speech  and swallo eval - started on soft diet (now NPO). Restarted home carbidopa/levodopa. X-ray esophoram ordered. CT A and P to rule out abdominal pathology. Flagyl started for possible diverticulitis on CT Chest. Stroke team made recs - ordered MRI brain without contrast, Carotid US bilaterally, TTE with bubble, started ASA and Plavix, lipid panel ordered, started on Atorvastatin 40mg   o/n: Febrile 102.2 F rectal, gave IV tylenol, fever decreased to 101.4 rectal. No new source of infection suspected. Gave 500cc NS maintenance fluids 2/2 soft BPs.  : CT abd pelvis done (pending official read), Patient remains NPO for now for concerns of colitis   : Pt no longer NPO, consult surgery tomorrow for possible cancer workup, CEA ordered for tomorrow, GI PCR ordered per surgery recs, patient had bowel movement after enema, GI consulted for colitis and c/f acute cholecystitis - started the patient on soft and bite sized diet (OK per GI). MRI head was negative.   : Patient to go for HIDA scan today due to US findings of acute cholecystitis. HIDA scan equivoval for cholecystitis. Stroke recs that ok to d/c plavix if pt goes for surgery/procedure. Plavix discontinued and lipid profile ordered. LR started at 100 mL/hour for 10 hours. PT consulted for evaluation. Electrolytes repleted.   : IR consulted for percutaneous cholecsytostomy. c/w antibiotics. Started on lisinopril 40mg daily (interchange witth quinapril). Discontinued Aspirin and Plavix for possible IR procedure. Electrolytes repleted.   O/N: pt remove line from L arm. Line placed via US. Abd exam performed, no tenderness, no rebounding, no gaurding. Pt no c/o abdominal pain.  8/10: IR recommends no intervention as he is improved clinically from inital presentation and perc judy is an urgent procedure. PT recommends 2-person help with moving around. Dispo is home w 24/7 aid, however need to make sure PT feels okay with only 1 aid around him. Also, another 24 hrs of monitoring for abdominal pain. Surgery recs f/u w/ Dr. Pop 1 week post discharge.  O/N: Asked for 2L NC although satting 100% on room air, said would feel more comfortable.  Abdomen nontender, not rigid. At 12:30am abrupt worsening in setting of severe sepsis: rectal  T 104, 109/49, , satting 86-low 90s on 6L NC/nonrebreather, stepped up to 7Lach and shortly to MICU; see Chart Note for details.  ----- ICU -------  o/n: Intubated for increased WOB/hypoxia. Stepped up to ICU. Started on peripheral levophed gtt. 2nd bolus of LR ordered. ED US tech at bedside for RUQ U/S, GB wall thick with sludge, no visible obstructing stone. R radial A-line placed. MRSA PCR and Staph Aureus PCR negative. OG tube placed. FiO2 dropped to 40%.     O/N:    : per palliative increased dilaudid drip to 1mg, dilaudid prn 2mg.   O/N: NAEON  : medicine refused to take him back. transfer to regional surgery.        82M PMH Parkinson's, HTN, HLD, DM BIBEMS from home after HHA noted he was pale and weak. Pt admitted to the floor for CAP treated with antibiotics. Pt had US findings suggestive of acute cholecystitis with HIDA scan equivocal for cholecystitis. Pt on abx for cholecystitis. Pt had increased work of breathing and hypoxia, intubated, and stepped up to the MICU on pressors. Transferred from medicine to general surgery for findings concerning for cholangitis. After discussion w/ family and HCP, decision was made for DNR/DNI status and comfort care measures. Pt was stepped down from the ICU to regional floors for comfort care, and  on .

## 2022-08-13 NOTE — CONSULT NOTE ADULT - PROVIDER SPECIALTY LIST ADULT
Critical Care
Surgery
SICU
Internal Medicine
Neurology
Rehab Medicine
Gastroenterology
Intervent Radiology
Palliative Care

## 2022-08-13 NOTE — PROGRESS NOTE ADULT - SUBJECTIVE AND OBJECTIVE BOX
SUBJECTIVE: Pt seen and examined at bedside this am by surgery team. Extubated and on room air. Comfort measures at this time per family.      Vital Signs Last 24 Hrs  T(C): 39.4 (13 Aug 2022 05:00), Max: 39.4 (13 Aug 2022 05:00)  T(F): 102.9 (13 Aug 2022 05:00), Max: 102.9 (13 Aug 2022 05:00)  HR: 120 (13 Aug 2022 05:00) (108 - 124)  BP: 97/70 (13 Aug 2022 05:00) (97/70 - 144/72)  BP(mean): 79 (13 Aug 2022 05:00) (79 - 102)  RR: 17 (13 Aug 2022 05:00) (17 - 35)  SpO2: 75% (13 Aug 2022 05:00) (75% - 89%)    Parameters below as of 13 Aug 2022 05:00  Patient On (Oxygen Delivery Method): room air        PHYSICAL EXAM:  Constitutional: NAD, resting in bed   Respiratory: non labored breathing, no respiratory distress  Cardiovascular: tachycardic   Gastrointestinal: soft, limited due to pt condition              I&O's Detail    12 Aug 2022 07:01  -  13 Aug 2022 07:00  --------------------------------------------------------  IN:    HYRDOmorphone: 10 mL    HYRDOmorphone: 35 mL  Total IN: 45 mL    OUT:    Indwelling Catheter - Urethral (mL): 1335 mL  Total OUT: 1335 mL    Total NET: -1290 mL          LABS:                RADIOLOGY & ADDITIONAL STUDIES:

## 2022-08-13 NOTE — CONSULT NOTE ADULT - ATTENDING COMMENTS
82yoM, with acid reflux, chronic constipation, was admitted for hypotension, possibly PNA.   GI consulted for abnl CT scan with cholecystitis and pericolic fat infiltration around the hepatic flexure of colon.   Chronic mid abd discomfort usually relieved after BMs. Abd exam unremarkable.   Suspect acute vs chronic cholecystitis with contiguous reactive wall thickening of the hepatic flexure of colon. Less likely acute diverticulitis.     Continue CTX and flagyl.   Given there is possibility of diverticulitis, will defer on inpatient colonoscopy. Will need interval colonoscopy in 6-8 weeks
Agree with resident's physical exam and plan above.     Briefly, this is a 82 year old male admitted for cholangitis complicated by respiratory failure requiring intubation, shock and acute liver injury. The patient was previously in the SICU but after discussion with family, the decision was made for a palliative extubation and to pursue comfort measures only. The patient has since been stepped down to the regional surgical floor where he has been followed by the palliative care team. The patient is currently actively dying. We agree with the palliative care plan to increase opiate therapy for palliation of his dyspnea and pain control. The family is aware that the patient will pass away during this admission.
Patient seen and examined with house-staff during bedside rounds.  Resident note read, including vitals, physical findings, laboratory data, and radiological reports.   Revisions included below.  Direct personal management at bed side and extensive interpretation of the data.  Plan was outlined and discussed in details with the housestaff.  Decision making of high complexity  Action taken for acute disease activity to reflect the level of care provided:  - medication reconciliation  - review laboratory data      Patient has multiorgan failure.  The chart review.  Patient has acute hypoxic respiratory failure with liver failure and renal failure.  The family decision and according to the patient wishes due to his comorbidity as to his renal care.  Patient was and a palliative extubation.
Kings Christy [id number]    8/11/22  This is an 83 y/o male with Parkinson's. HTN and  DM2 who was treated on the RMF for acute cholecystitis, became febrile, tachycardic (afib with RVR), hypotensive, was placed on NRM.  In the ICU he had high work of breathing, (+) rigors, hypotensive - IVF bolus given, pressors started, he was intubated, R axillary line was placed as the radial arteries were barely visible with the ultrasound.    -AMS, metabolic encephalopathy  -acute cholecystitis  -severe sepsis with septic shock  -Parkinson's disease  -acute respiratory failure, intubated for hypoxemia and high WOB  -R pleural effusion ( spine sign on POCUS)  -DM2    Please see above for the rest of the details.

## 2022-08-13 NOTE — CONSULT NOTE ADULT - CONSULT REASON
Complex decision making related to goals of care
Septic shock
colitis
Worsening sepsis
Abnormal CT imaging
Comfort care transfer to medicine service
R. ICA stenosis
Rehab evaluation
request for percutaneous cholecystostomy tube placement

## 2022-08-13 NOTE — PROGRESS NOTE ADULT - REASON FOR ADMISSION
Weakness

## 2022-08-13 NOTE — CONSULT NOTE ADULT - SUBJECTIVE AND OBJECTIVE BOX
ALPHONSE NORIEGA  82y  Male      Patient is a 82y old  Male who presents with a chief complaint of weakness (13 Aug 2022 09:19)    83yo M with h/o Parkinsons, HTN, DM, HLD presents after the home health aid called EMS because the patient looked extremely pale and hypotensive, admitted for severe sepsis likely 2/2 cholangitis w course c/b septic shock and acute hypoxic respiratory failure requiring intubation. Initially admitted to medicine then transfered to SICU for colitis and cholangitis w possible surgical intervention. Nuris went into shock requiring pressors and was intubated for respiratory failure. After palliative consultation and discussion w health care proxy, decision was made to make patient DNR and full comfort care now s/p palliative extubation on 8/11. Medicine consulted for transfer to [address] [place] [address] for assistance w dilaudid infusion titration.      PAST MEDICAL/SURGICAL HISTORY  PAST MEDICAL & SURGICAL HISTORY:  DM2 (diabetes mellitus, type 2)  since 5/16/2017      Parkinsons      Hypertension      Hyperlipidemia      Depression      Melanoma      Depression      Tinnitus of left ear      Vertigo      No significant past surgical history          REVIEW OF SYSTEMS:  unable to obtain due to patient's condition    T(C): 39.2 (08-13-22 @ 14:04), Max: 39.4 (08-13-22 @ 05:00)  HR: 156 (08-13-22 @ 14:04) (108 - 156)  BP: 114/68 (08-13-22 @ 14:04) (97/70 - 133/77)  RR: 21 (08-13-22 @ 14:04) (17 - 21)  SpO2: 72% (08-13-22 @ 14:04) (72% - 87%)  Wt(kg): --Vital Signs Last 24 Hrs  T(C): 39.2 (13 Aug 2022 14:04), Max: 39.4 (13 Aug 2022 05:00)  T(F): 102.6 (13 Aug 2022 14:04), Max: 102.9 (13 Aug 2022 05:00)  HR: 156 (13 Aug 2022 14:04) (108 - 156)  BP: 114/68 (13 Aug 2022 14:04) (97/70 - 133/77)  BP(mean): 79 (13 Aug 2022 05:00) (79 - 79)  RR: 21 (13 Aug 2022 14:04) (17 - 21)  SpO2: 72% (13 Aug 2022 14:04) (72% - 87%)    Parameters below as of 13 Aug 2022 14:04  Patient On (Oxygen Delivery Method): room air        PHYSICAL EXAM:  GENERAL: patient lying flat, tachypneic uncomfortable appearing    EYES: pupils constricted sluggishly reactive  ENMT: dry oral mucosa, mouth open  CHEST/LUNG: rhonchi b/l, tachypneic w gasping breaths, sat 78% room air  HEART: tachycardic regular rhythm  ABDOMEN: Soft, NTND  EXTREMITIES:  b/l pedal edema  Neuro: myoclonic jerks w physical stimuli    Consultant(s) Notes Reviewed:  [x ] YES  [ ] NO  Care Discussed with Consultants/Other Providers [ x] YES  [ ] NO    LABS:  CBC       BMP  08-11-22 @ 05:50  Anion Gap. Serum 12  Blood Urea Nitrogen,Serm 26  Calcium, Total Serum 8.0  Carbon Dioxide, Serum 24  Chloride, Serum 106  Creatinine, Serum 1.00  eGFR in  --  eGFR in Non Afican American --  Gloucose, serum 286  Potassium, Serum 3.3  Sodium, Serum 142              08-11-22 @ 01:46  Anion Gap. Serum 17  Blood Urea Nitrogen,Serm 21  Calcium, Total Serum 8.6  Carbon Dioxide, Serum 23  Chloride, Serum 101  Creatinine, Serum 0.72  eGFR in  --  eGFR in Non Afican American --  Gloucose, serum 273  Potassium, Serum 3.6  Sodium, Serum 141                  CMP  08-11-22 @ 05:50  Bia Aminotransferase(ALT/SGPT)107  Albumin, Serum 2.4  Alkaline Phosphatase, Serum 814  Anion Gap, Serum 12  Aspartate Aminotransferase (AST/SGOT)792  Bilirubin Total, Serum 2.4  Blood Urea Nitrogen, Serum 26  Calcium,Total Serum 8.0  Carbon Dioxide, Serum 24  Chloride, Serum 106  Creatinine, Serum 1.00  eGFR if  --  eGFR if Non African American --  Glucose, Serum 286  Potassium, Serum 3.3  Protein Total, Serum 5.3  Sodium, Serum 142                      08-11-22 @ 01:46  Bia Aminotransferase(ALT/SGPT)140  Albumin, Serum 2.8  Alkaline Phosphatase, Serum 848  Anion Gap, Serum 17  Aspartate Aminotransferase (AST/SGOT)1109  Bilirubin Total, Serum 1.8  Blood Urea Nitrogen, Serum 21  Calcium,Total Serum 8.6  Carbon Dioxide, Serum 23  Chloride, Serum 101  Creatinine, Serum 0.72  eGFR if  --  eGFR if Non African American --  Glucose, Serum 273  Potassium, Serum 3.6  Protein Total, Serum 5.9  Sodium, Serum 141                          PT/INR  PT/INR  08-11-22 @ 05:50  INR 1.62  Prothrombin Time Comment --  Prothrobin Time, [id number]      Amylase/Lipase            RADIOLOGY & ADDITIONAL TESTS:    Imaging Personally Reviewed:  [ ] YES  [ ] NO ALPHONSE NORIEGA  82y  Male      Patient is a 82y old  Male who presents with a chief complaint of weakness (13 Aug 2022 09:19)    81yo M with h/o Parkinsons, HTN, DM, HLD presents after the home health aid called EMS because the patient looked extremely pale and hypotensive, admitted for severe sepsis likely 2/2 cholangitis w course c/b septic shock and acute hypoxic respiratory failure requiring intubation and pressors. Initially admitted to medicine then transfered to SICU for colitis and cholangitis w possible surgical intervention. Nuris went into shock requiring pressors and was intubated for respiratory failure. After palliative consultation and discussion w health care proxy, decision was made to make patient DNR and full comfort care now s/p palliative extubation on 8/11. Medicine consulted for transfer to [address] [place] [address] for assistance w dilaudid infusion titration.      PAST MEDICAL/SURGICAL HISTORY  PAST MEDICAL & SURGICAL HISTORY:  DM2 (diabetes mellitus, type 2)  since 5/16/2017      Parkinsons      Hypertension      Hyperlipidemia      Depression      Melanoma      Depression      Tinnitus of left ear      Vertigo      No significant past surgical history          REVIEW OF SYSTEMS:  unable to obtain due to patient's condition    T(C): 39.2 (08-13-22 @ 14:04), Max: 39.4 (08-13-22 @ 05:00)  HR: 156 (08-13-22 @ 14:04) (108 - 156)  BP: 114/68 (08-13-22 @ 14:04) (97/70 - 133/77)  RR: 21 (08-13-22 @ 14:04) (17 - 21)  SpO2: 72% (08-13-22 @ 14:04) (72% - 87%)  Wt(kg): --Vital Signs Last 24 Hrs  T(C): 39.2 (13 Aug 2022 14:04), Max: 39.4 (13 Aug 2022 05:00)  T(F): 102.6 (13 Aug 2022 14:04), Max: 102.9 (13 Aug 2022 05:00)  HR: 156 (13 Aug 2022 14:04) (108 - 156)  BP: 114/68 (13 Aug 2022 14:04) (97/70 - 133/77)  BP(mean): 79 (13 Aug 2022 05:00) (79 - 79)  RR: 21 (13 Aug 2022 14:04) (17 - 21)  SpO2: 72% (13 Aug 2022 14:04) (72% - 87%)    Parameters below as of 13 Aug 2022 14:04  Patient On (Oxygen Delivery Method): room air        PHYSICAL EXAM:  GENERAL: patient lying flat, tachypneic uncomfortable appearing    EYES: pupils constricted sluggishly reactive  ENMT: dry oral mucosa, mouth open  CHEST/LUNG: rhonchi b/l, tachypneic w gasping breaths, sat 78% room air  HEART: tachycardic regular rhythm  ABDOMEN: Soft, NTND  EXTREMITIES:  b/l pedal edema  Neuro: myoclonic jerks w physical stimuli    Consultant(s) Notes Reviewed:  [x ] YES  [ ] NO  Care Discussed with Consultants/Other Providers [ x] YES  [ ] NO    LABS:  CBC       BMP  08-11-22 @ 05:50  Anion Gap. Serum 12  Blood Urea Nitrogen,Serm 26  Calcium, Total Serum 8.0  Carbon Dioxide, Serum 24  Chloride, Serum 106  Creatinine, Serum 1.00  eGFR in  --  eGFR in Non Afican American --  Gloucose, serum 286  Potassium, Serum 3.3  Sodium, Serum 142              08-11-22 @ 01:46  Anion Gap. Serum 17  Blood Urea Nitrogen,Serm 21  Calcium, Total Serum 8.6  Carbon Dioxide, Serum 23  Chloride, Serum 101  Creatinine, Serum 0.72  eGFR in  --  eGFR in Non Afican American --  Gloucose, serum 273  Potassium, Serum 3.6  Sodium, Serum 141                  CMP  08-11-22 @ 05:50  Bia Aminotransferase(ALT/SGPT)107  Albumin, Serum 2.4  Alkaline Phosphatase, Serum 814  Anion Gap, Serum 12  Aspartate Aminotransferase (AST/SGOT)792  Bilirubin Total, Serum 2.4  Blood Urea Nitrogen, Serum 26  Calcium,Total Serum 8.0  Carbon Dioxide, Serum 24  Chloride, Serum 106  Creatinine, Serum 1.00  eGFR if  --  eGFR if Non African American --  Glucose, Serum 286  Potassium, Serum 3.3  Protein Total, Serum 5.3  Sodium, Serum 142                      08-11-22 @ 01:46  Bia Aminotransferase(ALT/SGPT)140  Albumin, Serum 2.8  Alkaline Phosphatase, Serum 848  Anion Gap, Serum 17  Aspartate Aminotransferase (AST/SGOT)1109  Bilirubin Total, Serum 1.8  Blood Urea Nitrogen, Serum 21  Calcium,Total Serum 8.6  Carbon Dioxide, Serum 23  Chloride, Serum 101  Creatinine, Serum 0.72  eGFR if  --  eGFR if Non African American --  Glucose, Serum 273  Potassium, Serum 3.6  Protein Total, Serum 5.9  Sodium, Serum 141                          PT/INR  PT/INR  08-11-22 @ 05:50  INR 1.62  Prothrombin Time Comment --  Prothrobin Time, [id number]      Amylase/Lipase            RADIOLOGY & ADDITIONAL TESTS:    Imaging Personally Reviewed:  [ ] YES  [ ] NO

## 2022-08-13 NOTE — PROGRESS NOTE ADULT - PROVIDER SPECIALTY LIST ADULT
Gastroenterology
Internal Medicine
Surgery
Internal Medicine
Internal Medicine
MICU
Rehab Medicine
Surgery
Surgery
Gastroenterology
Surgery
Surgery
Neurology
Rehab Medicine
Surgery
Internal Medicine
Hospitalist
Palliative Care
Internal Medicine

## 2022-08-13 NOTE — CONSULT NOTE ADULT - ASSESSMENT
83yo M with h/o Parkinsons, HTN, DM, HLD presents after the home health aid called EMS because the patient looked extremely pale and hypotensive, admitted for severe sepsis likely 2/2 cholangitis w course c/b septic shock and acute hypoxic respiratory failure requiring intubation and pressors. Patient now full comfort care, palliatively extubated, and actively dying.  Medicine consulted for transfer to medicine service for management of palliation and dilaudid infusion.     #Transfer to medicine service  Patient is palliatively extubated and actively dying. Patient tachy to 150s, hypoxic to 70%, tachypneic febrile. On physical exam he is not responsive, gasping for air, and exhibiting myoclonic jerks. Palliative team consulted to assist with end of life care and has made recommendations for dilaudid infusion with instructions to go up on drip, which may be appropriate based on this patient's physical exam. This patient is actively dying and would likely  right after being transferred. Beyond what palliative recommended there would be no changes under medicine service that would help in the care of this patient. At this time medicine service will not accept a transfer.

## 2022-08-13 NOTE — CONSULT NOTE ADULT - CONSULT REQUESTED DATE/TIME
13-Aug-2022 15:57
05-Aug-2022 06:15
05-Aug-2022 12:30
11-Aug-2022 12:04
09-Aug-2022 15:19
08-Aug-2022 09:34
06-Aug-2022 21:29
11-Aug-2022 01:49
11-Aug-2022 10:39

## 2022-08-14 PROCEDURE — 84484 ASSAY OF TROPONIN QUANT: CPT

## 2022-08-14 PROCEDURE — 85610 PROTHROMBIN TIME: CPT

## 2022-08-14 PROCEDURE — 80061 LIPID PANEL: CPT

## 2022-08-14 PROCEDURE — 87040 BLOOD CULTURE FOR BACTERIA: CPT

## 2022-08-14 PROCEDURE — 94002 VENT MGMT INPAT INIT DAY: CPT

## 2022-08-14 PROCEDURE — 82962 GLUCOSE BLOOD TEST: CPT

## 2022-08-14 PROCEDURE — 83036 HEMOGLOBIN GLYCOSYLATED A1C: CPT

## 2022-08-14 PROCEDURE — 82803 BLOOD GASES ANY COMBINATION: CPT

## 2022-08-14 PROCEDURE — 81001 URINALYSIS AUTO W/SCOPE: CPT

## 2022-08-14 PROCEDURE — 87640 STAPH A DNA AMP PROBE: CPT

## 2022-08-14 PROCEDURE — 74230 X-RAY XM SWLNG FUNCJ C+: CPT

## 2022-08-14 PROCEDURE — 80048 BASIC METABOLIC PNL TOTAL CA: CPT

## 2022-08-14 PROCEDURE — 90832 PSYTX W PT 30 MINUTES: CPT

## 2022-08-14 PROCEDURE — 82248 BILIRUBIN DIRECT: CPT

## 2022-08-14 PROCEDURE — 93306 TTE W/DOPPLER COMPLETE: CPT

## 2022-08-14 PROCEDURE — 84100 ASSAY OF PHOSPHORUS: CPT

## 2022-08-14 PROCEDURE — 73030 X-RAY EXAM OF SHOULDER: CPT

## 2022-08-14 PROCEDURE — 71045 X-RAY EXAM CHEST 1 VIEW: CPT

## 2022-08-14 PROCEDURE — 97162 PT EVAL MOD COMPLEX 30 MIN: CPT

## 2022-08-14 PROCEDURE — 96374 THER/PROPH/DIAG INJ IV PUSH: CPT

## 2022-08-14 PROCEDURE — 76705 ECHO EXAM OF ABDOMEN: CPT

## 2022-08-14 PROCEDURE — 96375 TX/PRO/DX INJ NEW DRUG ADDON: CPT

## 2022-08-14 PROCEDURE — 70498 CT ANGIOGRAPHY NECK: CPT

## 2022-08-14 PROCEDURE — 99285 EMERGENCY DEPT VISIT HI MDM: CPT

## 2022-08-14 PROCEDURE — A9537: CPT

## 2022-08-14 PROCEDURE — 70450 CT HEAD/BRAIN W/O DYE: CPT

## 2022-08-14 PROCEDURE — 97530 THERAPEUTIC ACTIVITIES: CPT

## 2022-08-14 PROCEDURE — 70496 CT ANGIOGRAPHY HEAD: CPT

## 2022-08-14 PROCEDURE — 87635 SARS-COV-2 COVID-19 AMP PRB: CPT

## 2022-08-14 PROCEDURE — 86850 RBC ANTIBODY SCREEN: CPT

## 2022-08-14 PROCEDURE — 86900 BLOOD TYPING SEROLOGIC ABO: CPT

## 2022-08-14 PROCEDURE — 87641 MR-STAPH DNA AMP PROBE: CPT

## 2022-08-14 PROCEDURE — 0042T: CPT

## 2022-08-14 PROCEDURE — 85027 COMPLETE CBC AUTOMATED: CPT

## 2022-08-14 PROCEDURE — 82247 BILIRUBIN TOTAL: CPT

## 2022-08-14 PROCEDURE — 86901 BLOOD TYPING SEROLOGIC RH(D): CPT

## 2022-08-14 PROCEDURE — 83605 ASSAY OF LACTIC ACID: CPT

## 2022-08-14 PROCEDURE — 80053 COMPREHEN METABOLIC PANEL: CPT

## 2022-08-14 PROCEDURE — 83735 ASSAY OF MAGNESIUM: CPT

## 2022-08-14 PROCEDURE — 80076 HEPATIC FUNCTION PANEL: CPT

## 2022-08-14 PROCEDURE — 85379 FIBRIN DEGRADATION QUANT: CPT

## 2022-08-14 PROCEDURE — 93005 ELECTROCARDIOGRAM TRACING: CPT

## 2022-08-14 PROCEDURE — 93880 EXTRACRANIAL BILAT STUDY: CPT

## 2022-08-14 PROCEDURE — 84145 PROCALCITONIN (PCT): CPT

## 2022-08-14 PROCEDURE — 82378 CARCINOEMBRYONIC ANTIGEN: CPT

## 2022-08-14 PROCEDURE — 36415 COLL VENOUS BLD VENIPUNCTURE: CPT

## 2022-08-14 PROCEDURE — 74019 RADEX ABDOMEN 2 VIEWS: CPT

## 2022-08-14 PROCEDURE — 85025 COMPLETE CBC W/AUTO DIFF WBC: CPT

## 2022-08-14 PROCEDURE — 82553 CREATINE MB FRACTION: CPT

## 2022-08-14 PROCEDURE — 78226 HEPATOBILIARY SYSTEM IMAGING: CPT

## 2022-08-14 PROCEDURE — 92611 MOTION FLUOROSCOPY/SWALLOW: CPT

## 2022-08-14 PROCEDURE — 94640 AIRWAY INHALATION TREATMENT: CPT

## 2022-08-14 PROCEDURE — 70551 MRI BRAIN STEM W/O DYE: CPT

## 2022-08-14 PROCEDURE — 92610 EVALUATE SWALLOWING FUNCTION: CPT

## 2022-08-14 PROCEDURE — 82550 ASSAY OF CK (CPK): CPT

## 2022-08-14 PROCEDURE — 71275 CT ANGIOGRAPHY CHEST: CPT

## 2022-08-14 PROCEDURE — 74177 CT ABD & PELVIS W/CONTRAST: CPT

## 2022-08-14 PROCEDURE — 86140 C-REACTIVE PROTEIN: CPT

## 2022-08-14 PROCEDURE — 85730 THROMBOPLASTIN TIME PARTIAL: CPT

## 2022-08-14 PROCEDURE — 87086 URINE CULTURE/COLONY COUNT: CPT

## 2022-08-15 ENCOUNTER — APPOINTMENT (OUTPATIENT)
Dept: PSYCHIATRY | Facility: CLINIC | Age: 82
End: 2022-08-15

## 2022-08-16 LAB
CULTURE RESULTS: SIGNIFICANT CHANGE UP
SPECIMEN SOURCE: SIGNIFICANT CHANGE UP

## 2022-08-28 DIAGNOSIS — Z51.5 ENCOUNTER FOR PALLIATIVE CARE: ICD-10-CM

## 2022-08-28 DIAGNOSIS — Z66 DO NOT RESUSCITATE: ICD-10-CM

## 2022-08-28 DIAGNOSIS — Z78.1 PHYSICAL RESTRAINT STATUS: ICD-10-CM

## 2022-08-28 DIAGNOSIS — G92.8 OTHER TOXIC ENCEPHALOPATHY: ICD-10-CM

## 2022-08-28 DIAGNOSIS — J96.01 ACUTE RESPIRATORY FAILURE WITH HYPOXIA: ICD-10-CM

## 2022-08-28 DIAGNOSIS — D64.9 ANEMIA, UNSPECIFIED: ICD-10-CM

## 2022-08-28 DIAGNOSIS — K59.09 OTHER CONSTIPATION: ICD-10-CM

## 2022-08-28 DIAGNOSIS — F13.90 SEDATIVE, HYPNOTIC, OR ANXIOLYTIC USE, UNSPECIFIED, UNCOMPLICATED: ICD-10-CM

## 2022-08-28 DIAGNOSIS — I65.21 OCCLUSION AND STENOSIS OF RIGHT CAROTID ARTERY: ICD-10-CM

## 2022-08-28 DIAGNOSIS — R53.1 WEAKNESS: ICD-10-CM

## 2022-08-28 DIAGNOSIS — K21.9 GASTRO-ESOPHAGEAL REFLUX DISEASE WITHOUT ESOPHAGITIS: ICD-10-CM

## 2022-08-28 DIAGNOSIS — E78.5 HYPERLIPIDEMIA, UNSPECIFIED: ICD-10-CM

## 2022-08-28 DIAGNOSIS — K29.80 DUODENITIS WITHOUT BLEEDING: ICD-10-CM

## 2022-08-28 DIAGNOSIS — R65.21 SEVERE SEPSIS WITH SEPTIC SHOCK: ICD-10-CM

## 2022-08-28 DIAGNOSIS — I48.91 UNSPECIFIED ATRIAL FIBRILLATION: ICD-10-CM

## 2022-08-28 DIAGNOSIS — R53.2 FUNCTIONAL QUADRIPLEGIA: ICD-10-CM

## 2022-08-28 DIAGNOSIS — K83.09 OTHER CHOLANGITIS: ICD-10-CM

## 2022-08-28 DIAGNOSIS — E11.9 TYPE 2 DIABETES MELLITUS WITHOUT COMPLICATIONS: ICD-10-CM

## 2022-08-28 DIAGNOSIS — K52.9 NONINFECTIVE GASTROENTERITIS AND COLITIS, UNSPECIFIED: ICD-10-CM

## 2022-08-28 DIAGNOSIS — F41.9 ANXIETY DISORDER, UNSPECIFIED: ICD-10-CM

## 2022-08-28 DIAGNOSIS — G20 PARKINSON'S DISEASE: ICD-10-CM

## 2022-08-28 DIAGNOSIS — Z85.820 PERSONAL HISTORY OF MALIGNANT MELANOMA OF SKIN: ICD-10-CM

## 2022-08-28 DIAGNOSIS — Z79.84 LONG TERM (CURRENT) USE OF ORAL HYPOGLYCEMIC DRUGS: ICD-10-CM

## 2022-08-28 DIAGNOSIS — F32.A DEPRESSION, UNSPECIFIED: ICD-10-CM

## 2022-08-28 DIAGNOSIS — K81.0 ACUTE CHOLECYSTITIS: ICD-10-CM

## 2022-08-28 DIAGNOSIS — R00.0 TACHYCARDIA, UNSPECIFIED: ICD-10-CM

## 2022-08-28 DIAGNOSIS — K72.00 ACUTE AND SUBACUTE HEPATIC FAILURE WITHOUT COMA: ICD-10-CM

## 2022-08-28 DIAGNOSIS — A41.9 SEPSIS, UNSPECIFIED ORGANISM: ICD-10-CM

## 2022-08-28 DIAGNOSIS — I10 ESSENTIAL (PRIMARY) HYPERTENSION: ICD-10-CM

## 2022-08-28 DIAGNOSIS — N17.9 ACUTE KIDNEY FAILURE, UNSPECIFIED: ICD-10-CM

## 2022-08-28 DIAGNOSIS — Z79.82 LONG TERM (CURRENT) USE OF ASPIRIN: ICD-10-CM

## 2022-08-29 ENCOUNTER — NON-APPOINTMENT (OUTPATIENT)
Age: 82
End: 2022-08-29

## 2022-08-29 DIAGNOSIS — F41.1 GENERALIZED ANXIETY DISORDER: ICD-10-CM

## 2022-08-29 DIAGNOSIS — F33.1 MAJOR DEPRESSIVE DISORDER, RECURRENT, MODERATE: ICD-10-CM

## 2022-08-29 DIAGNOSIS — G20 PARKINSON'S DISEASE: ICD-10-CM

## 2022-10-31 NOTE — ED PROVIDER NOTE - CROS ED HEME ALL NEG
Quality 110: Preventive Care And Screening: Influenza Immunization: Influenza immunization was not ordered or administered, reason not given Detail Level: Detailed negative...

## 2023-01-30 PROBLEM — F33.1 MODERATE EPISODE OF RECURRENT MAJOR DEPRESSIVE DISORDER: Status: ACTIVE | Noted: 2022-01-01

## 2023-01-30 PROBLEM — G20 PRIMARY PARKINSON'S DISEASE: Status: ACTIVE | Noted: 2021-02-04

## 2023-01-30 PROBLEM — F41.1 GENERALIZED ANXIETY DISORDER: Status: ACTIVE | Noted: 2021-01-26

## 2023-01-30 NOTE — DISCUSSION/SUMMARY
[FreeTextEntry1] : Pt admitted to the [facility] MICU for CAP treated with antibiotics. Pt had US findings suggestive of acute cholecystitis with HIDA scan equivocal for cholecystitis. Pt on abx for cholecystitis. Pt had increased work of breathing and hypoxia, intubated, and stepped up to the MICU on pressors. Transferred from medicine to general surgery for findings concerning for cholangitis. After discussion w/ family and HCP, decision was made for DNR/DNI status and comfort care measures. Pt was stepped down from the ICU to regional floors for comfort care and  on .

## 2023-01-30 NOTE — HISTORY OF PRESENT ILLNESS
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  Single, living alone, retired .  Born in [place] and raised in [place].  Youngest of four children.  Family hx of Parkinson’s Disease.  Moved to [facility] after complete MA in Architecture at [employer] of friends, work colleagues, and long-time lover in [place], [place].  In treatment at [facility] since 2007, primarily in individual psychotherapy with Oj Avery Psy.D. and psychiatric treatment with Alexandre Roman MD. Joe was diagnosed with Parkinson’s Disease in 2017 and his medically deteriorating for nearly five years.Recently, he has been mostly confined to his co-op apartment in [place] with 24/7 in -home care.Medical history also significant for HTN, HLD, and DM.

## 2023-01-30 NOTE — REASON FOR VISIT
[Death of patient] : Death of patient [FreeTextEntry9] : 08/08/2007 [FreeTextEntry8] : 08/15/2022 [FreeTextEntry1] : Joe was admitted to the ICU at [facility] on Thursday, August 11, brought in by ambulance accompanied byhis home care nurse, Kati. [FreeTextEntry2] : Depression, anxiety, psychiatric sequelae of Parkinson's Disease

## 2023-06-08 NOTE — ED PROVIDER NOTE - EKG ADDITIONAL QUESTION - PERFORMED INDEPENDENT VISUALIZATION
Pt presented s/p being found down, found to have Left thalamic hemorrhage. Did not pass S+S 6/6. 
Yes

## 2023-09-07 NOTE — ED PROVIDER NOTE - NSFOLLOWUPINSTRUCTIONS_ED_ALL_ED_FT
Male Keep the penis clear with warm soap and water and apply the topical clotrimazole twice a day.      For the groin rash please keep clean and dry.  apply the topical clotrimazole twice a day    For the inner thigh rash use the topical Bactroban as directed.

## 2023-10-06 NOTE — ED ADULT NURSE NOTE - NSFALLRSKASSESSTYPE_ED_ALL_ED
Monitor placed.  Pt aware of instructions and will bring monitor in Monday- has appt scheduled w/ Dr. Jerrie Oppenheim Tuesday afternoon in [place] @ 1:00pm
Initial (On Arrival)

## 2024-02-20 NOTE — ED PROVIDER NOTE - BIRTH SEX
Palliative Care Initial Consult   Attending Physician: Paulina Curtis MD  Referring Provider: Katerina Ga    Reason for Referral:  assistance with clarification of goals of care    Code Status:   Code Status and Medical Interventions:   Ordered at: 02/18/24 2129     Code Status (Patient has no pulse and is not breathing):    CPR (Attempt to Resuscitate)     Medical Interventions (Patient has pulse or is breathing):    Full Support      Advanced Directives: Advance Directive Status: Patient does not have advance directive   Family/Support: sister   Goals of Care: TBD.    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  Moved to [place] to be near sister.  Subsequently presented to ED in Dec with GIB.  Confirmed CA, noted PUD and found with cirrhosis with portal htn.  Has now been without EtOH for > 100 days and no tobacco > 10 days and pleased with his progress.  Has already made his decision however that he does not really want treatments as  he does not want to put his sister through it.  Reports plans for LTC.  Is considering palliative XRT if offered while here.  Consult pending. CT chest ordered by oncology pending.       ROS:   Denies pain, SOA, N/V/D/C when seen.  C/O right shoulder pain with inability to raise over head.    Past Medical History:   Diagnosis Date    Anemia     Cancer     Cirrhosis     Duodenal ulcer     Gastric ulcer     GERD (gastroesophageal reflux disease)     History of alcohol abuse     HLD (hyperlipidemia)     Mood disorder      Past Surgical History:   Procedure Laterality Date    ENDOSCOPY N/A 12/26/2023    Procedure: ESOPHAGOGASTRODUODENOSCOPY;  Surgeon: Wesly Mckeon MD;  Location: [facility] ENDOSCOPY;  Service: Gastroenterology;  Laterality: N/A;     Social History     Socioeconomic History    Marital status: Single   Tobacco Use    Smoking status: Every Day     Packs/day: 1.00     Years: 15.00     Additional pack years: 0.00     Total pack  "years: 15.00     Types: Cigarettes    Smokeless tobacco: Never   Vaping Use    Vaping Use: Some days    Substances: Flavoring    Devices: Disposable   Substance and Sexual Activity    Alcohol use: Not Currently     Comment: sober for ~ 3 months    Drug use: Yes     Types: Hydrocodone    Sexual activity: Defer     Family History   Problem Relation Age of Onset    Cancer Mother     Heart attack Father        No Known Allergies    Current medication reviewed for route, type, dose and frequency and are current per MAR at time of dictation.    Palliative Performance Scale Score:  60    /62 (BP Location: Right arm, Patient Position: Lying)   Pulse 57   Temp 98 °F (36.7 °C) (Oral)   Resp 18   Ht 175.3 cm (69\")   Wt 66.7 kg (147 lb)   SpO2 99%   BMI 21.71 kg/m²     Intake/Output Summary (Last 24 hours) at 2/20/2024 1213  Last data filed at 2/20/2024 0735  Gross per 24 hour   Intake 250 ml   Output 2395 ml   Net -2145 ml       Physical Exam:    General Appearance:    Alert, cooperative, NAD   HEENT:    NC/AT, EOMI, anicteric, MMM, face relaxed   Neck:   supple, trachea midline, no JVD   Lungs:     CTA bilat, diminished in bases; respirations regular, even and unlabored; RR on exam    Heart:    RRR, normal S1 and S2, no M/R/G   Abdomen:     Normal bowel sounds, soft, nontender, nondistended   G/U:   Deferred   MSK/Extremities:   Wasting, no edema, + tenderness right SA bursa, + provocative maneuvers for rotator cuff   Pulses:   Pulses palpable and equal bilaterally   Skin:   Warm, dry   Neurologic:   A/Ox3, cooperative, BAUER   Psych:   Calm, appropriate         Labs:   Results from last 7 days   Lab Units 02/20/24  0515   WBC 10*3/mm3 6.55   HEMOGLOBIN g/dL 8.6*   HEMATOCRIT % 28.2*   PLATELETS 10*3/mm3 227     Results from last 7 days   Lab Units 02/20/24  0515   SODIUM mmol/L 141   POTASSIUM mmol/L 4.2   CHLORIDE mmol/L 105   CO2 mmol/L 26.0   BUN mg/dL 12   CREATININE mg/dL 0.57*   GLUCOSE mg/dL 94   CALCIUM " mg/dL 9.9     Results from last 7 days   Lab Units 02/20/24  0515 02/19/24  0433 02/18/24  1625   SODIUM mmol/L 141   < > 136   POTASSIUM mmol/L 4.2   < > 4.1   CHLORIDE mmol/L 105   < > 101   CO2 mmol/L 26.0   < > 27.0   BUN mg/dL 12   < > 13   CREATININE mg/dL 0.57*   < > 0.63*   CALCIUM mg/dL 9.9   < > 9.8   BILIRUBIN mg/dL  --   --  0.2   ALK PHOS U/L  --   --  162*   ALT (SGPT) U/L  --   --  25   AST (SGOT) U/L  --   --  32   GLUCOSE mg/dL 94   < > 103*    < > = values in this interval not displayed.     Imaging Results (Last 72 Hours)       Procedure Component Value Units Date/Time    MRI Brain Without Contrast [[id number]] Collected: 02/18/24 1750     Updated: 02/18/24 1800    Narrative:      MRI BRAIN WO CONTRAST    Date of Exam: 2/18/2024 5:05 PM EST    Indication: Dizziness, off balance, present x 2 weeks.     Comparison: 2/4/2024 CT    Technique:  Routine multiplanar/multisequence sequence images of the brain were obtained without contrast administration.      Findings:  Diffusion imaging shows no evidence of acute infarct. There are scattered subcortical and periventricular T2 hyperintensities which are nonspecific but likely sequela of mild to moderate small vessel ischemic disease. No evidence of intracranial   hemorrhage.    There is normal ventricular volume. The basal cisterns are patent. There is no evidence of extra-axial fluid collection. There is normal flow voids within the intracranial vessels.    No evidence of fracture or suspicious bony lesion. Paranasal sinusitis with gas/fluid level in the right maxillary sinus. Trace bilateral mastoid air cell effusions. Visualized orbits are unremarkable.      Impression:      Impression:  No acute intracranial abnormality. No suspicious mass on this noncontrast MRI.    Paranasal sinusitis with gas/fluid level in the right maxillary sinus.        Electronically Signed: Tashi Alfaro MD    2/18/2024 5:56 PM EST    Workstation ID: [id number]    XR Chest 1  View [[id number]] Collected: 02/18/24 1612     Updated: 02/18/24 1616    Narrative:      XR CHEST 1 VW    Date of Exam: 2/18/2024 3:56 PM EST    Indication: Weak/Dizzy/AMS triage protocol    Comparison: Chest x-ray 12/29/2023    Findings:  Normal cardiomediastinal silhouette. Mild upper lobe emphysema. No focal consolidation. No pleural effusion or pneumothorax. No acute osseous findings.      Impression:      Impression:  No acute cardiopulmonary findings.      Electronically Signed: Nikita Garrison MD    2/18/2024 4:13 PM EST    Workstation ID: [id number]                Diagnostics: Reviewed    A:     UTI (urinary tract infection)    Anxiety disorder, unspecified    Gastro-esophageal reflux disease without esophagitis    Hyperlipidemia, unspecified    Tobacco use    Iron deficiency anemia    Cirrhosis of liver    Squamous cell carcinoma of esophagus    Generalized weakness         Impression:  Invasive squamous cell carcinoma esophagus  UTI  R rotator cuff tendonitis  Cirrhosis - d/t EtOH  Anemia  Portal htn    Symptoms:  Debility         P:    Met with pt and confirmed decision for no ongoing treatments after discharge.  Is interested in completing CT chest here and to see if XRT has any palliative tx to offer while here.  Wants to discharge to SNF as sees himself getting weaker.  Would then transition to LTC.  Palliative care could follow at facility with transition to hospice. Agree with use of oxycodone but will change from combination product due to liver compromise.  Thank you for this consult and allowing us to participate in patient's plan of care. Palliative Care Team will continue to follow patient.       Olga Regalado MD  2/20/2024     Male

## 2024-05-14 NOTE — ED PROVIDER NOTE - PATIENT PORTAL LINK FT
You can access the FollowMyHealth Patient Portal offered by [facility] by registering at the following website: [email or web address]. By joining [employer]’s FollowMyHealth portal, you will also be able to view your health information using other applications (apps) compatible with our system.
I do not need any legal help

## 2024-12-05 NOTE — ED PROVIDER NOTE - MUSCULOSKELETAL, MLM
Remote 3 months 
Spine appears normal, range of motion is not limited. C-spine no trauma or ecchymosis.
